# Patient Record
Sex: FEMALE | Race: WHITE | HISPANIC OR LATINO
[De-identification: names, ages, dates, MRNs, and addresses within clinical notes are randomized per-mention and may not be internally consistent; named-entity substitution may affect disease eponyms.]

---

## 2017-01-17 ENCOUNTER — APPOINTMENT (OUTPATIENT)
Dept: FAMILY MEDICINE | Facility: CLINIC | Age: 67
End: 2017-01-17

## 2017-01-18 ENCOUNTER — RX RENEWAL (OUTPATIENT)
Age: 67
End: 2017-01-18

## 2017-02-06 ENCOUNTER — APPOINTMENT (OUTPATIENT)
Dept: FAMILY MEDICINE | Facility: CLINIC | Age: 67
End: 2017-02-06

## 2017-02-07 ENCOUNTER — RESULT REVIEW (OUTPATIENT)
Age: 67
End: 2017-02-07

## 2017-02-08 ENCOUNTER — TRANSCRIPTION ENCOUNTER (OUTPATIENT)
Age: 67
End: 2017-02-08

## 2017-02-08 ENCOUNTER — OUTPATIENT (OUTPATIENT)
Dept: OUTPATIENT SERVICES | Facility: HOSPITAL | Age: 67
LOS: 1 days | Discharge: HOME | End: 2017-02-08
Payer: COMMERCIAL

## 2017-02-08 DIAGNOSIS — K29.50 UNSPECIFIED CHRONIC GASTRITIS WITHOUT BLEEDING: ICD-10-CM

## 2017-02-08 DIAGNOSIS — Z80.0 FAMILY HISTORY OF MALIGNANT NEOPLASM OF DIGESTIVE ORGANS: ICD-10-CM

## 2017-02-08 DIAGNOSIS — I10 ESSENTIAL (PRIMARY) HYPERTENSION: ICD-10-CM

## 2017-02-08 DIAGNOSIS — I69.922 DYSARTHRIA FOLLOWING UNSPECIFIED CEREBROVASCULAR DISEASE: ICD-10-CM

## 2017-02-08 DIAGNOSIS — R10.9 UNSPECIFIED ABDOMINAL PAIN: ICD-10-CM

## 2017-02-08 DIAGNOSIS — K20.9 ESOPHAGITIS, UNSPECIFIED: ICD-10-CM

## 2017-02-08 PROCEDURE — 88305 TISSUE EXAM BY PATHOLOGIST: CPT

## 2017-02-08 PROCEDURE — 88312 SPECIAL STAINS GROUP 1: CPT | Mod: 26

## 2017-02-08 PROCEDURE — 88305 TISSUE EXAM BY PATHOLOGIST: CPT | Mod: 26

## 2017-02-08 PROCEDURE — 43239 EGD BIOPSY SINGLE/MULTIPLE: CPT

## 2017-02-08 PROCEDURE — 88312 SPECIAL STAINS GROUP 1: CPT

## 2017-03-08 ENCOUNTER — TRANSCRIPTION ENCOUNTER (OUTPATIENT)
Age: 67
End: 2017-03-08

## 2017-03-08 ENCOUNTER — OUTPATIENT (OUTPATIENT)
Dept: OUTPATIENT SERVICES | Facility: HOSPITAL | Age: 67
LOS: 1 days | Discharge: HOME | End: 2017-03-08
Payer: COMMERCIAL

## 2017-03-08 DIAGNOSIS — Z12.11 ENCOUNTER FOR SCREENING FOR MALIGNANT NEOPLASM OF COLON: ICD-10-CM

## 2017-03-08 DIAGNOSIS — K64.8 OTHER HEMORRHOIDS: ICD-10-CM

## 2017-03-08 DIAGNOSIS — K57.30 DIVERTICULOSIS OF LARGE INTESTINE WITHOUT PERFORATION OR ABSCESS WITHOUT BLEEDING: ICD-10-CM

## 2017-03-08 DIAGNOSIS — Z86.73 PERSONAL HISTORY OF TRANSIENT ISCHEMIC ATTACK (TIA), AND CEREBRAL INFARCTION WITHOUT RESIDUAL DEFICITS: ICD-10-CM

## 2017-03-08 DIAGNOSIS — Z80.0 FAMILY HISTORY OF MALIGNANT NEOPLASM OF DIGESTIVE ORGANS: ICD-10-CM

## 2017-03-08 DIAGNOSIS — I10 ESSENTIAL (PRIMARY) HYPERTENSION: ICD-10-CM

## 2017-03-08 PROCEDURE — G0105: CPT

## 2017-03-23 ENCOUNTER — APPOINTMENT (OUTPATIENT)
Dept: FAMILY MEDICINE | Facility: CLINIC | Age: 67
End: 2017-03-23

## 2017-03-23 VITALS
OXYGEN SATURATION: 96 % | HEIGHT: 61 IN | HEART RATE: 88 BPM | BODY MASS INDEX: 28.32 KG/M2 | SYSTOLIC BLOOD PRESSURE: 120 MMHG | DIASTOLIC BLOOD PRESSURE: 80 MMHG | TEMPERATURE: 98.7 F | WEIGHT: 150 LBS

## 2017-03-23 DIAGNOSIS — Z00.00 ENCOUNTER FOR GENERAL ADULT MEDICAL EXAMINATION W/OUT ABNORMAL FINDINGS: ICD-10-CM

## 2017-03-23 DIAGNOSIS — R07.89 OTHER CHEST PAIN: ICD-10-CM

## 2017-03-23 DIAGNOSIS — Z87.42 PERSONAL HISTORY OF OTHER DISEASES OF THE FEMALE GENITAL TRACT: ICD-10-CM

## 2017-03-27 ENCOUNTER — LABORATORY RESULT (OUTPATIENT)
Age: 67
End: 2017-03-27

## 2017-03-29 LAB
25(OH)D3 SERPL-MCNC: 24.9 NG/ML
ALBUMIN SERPL ELPH-MCNC: 3.7 G/DL
ALP BLD-CCNC: 61 U/L
ALT SERPL-CCNC: 10 U/L
ANION GAP SERPL CALC-SCNC: 16 MMOL/L
APPEARANCE: ABNORMAL
AST SERPL-CCNC: 16 U/L
BASOPHILS # BLD AUTO: 0.02 K/UL
BASOPHILS NFR BLD AUTO: 0.3 %
BILIRUB DIRECT SERPL-MCNC: 0.1 MG/DL
BILIRUB INDIRECT SERPL-MCNC: 0.2 MG/DL
BILIRUB SERPL-MCNC: 0.3 MG/DL
BILIRUBIN URINE: NEGATIVE
BLOOD URINE: ABNORMAL
BUN SERPL-MCNC: 17 MG/DL
CALCIUM SERPL-MCNC: 10 MG/DL
CHLORIDE SERPL-SCNC: 98 MMOL/L
CHOLEST SERPL-MCNC: 195 MG/DL
CHOLEST/HDLC SERPL: 4.8 RATIO
CO2 SERPL-SCNC: 24 MMOL/L
COLOR: YELLOW
CREAT SERPL-MCNC: 0.65 MG/DL
CREAT SPEC-SCNC: 41 MG/DL
EOSINOPHIL # BLD AUTO: 0.1 K/UL
EOSINOPHIL NFR BLD AUTO: 1.5 %
FOLATE SERPL-MCNC: 11.4 NG/ML
GLUCOSE QUALITATIVE U: NORMAL MG/DL
GLUCOSE SERPL-MCNC: 95 MG/DL
HBA1C MFR BLD HPLC: 6 %
HCT VFR BLD CALC: 38.2 %
HDLC SERPL-MCNC: 41 MG/DL
HGB BLD-MCNC: 12.1 G/DL
IMM GRANULOCYTES NFR BLD AUTO: 0.3 %
KETONES URINE: NEGATIVE
LDLC SERPL CALC-MCNC: 136 MG/DL
LEUKOCYTE ESTERASE URINE: NEGATIVE
LYMPHOCYTES # BLD AUTO: 2.33 K/UL
LYMPHOCYTES NFR BLD AUTO: 34.1 %
MAN DIFF?: NORMAL
MCHC RBC-ENTMCNC: 28.9 PG
MCHC RBC-ENTMCNC: 31.7 GM/DL
MCV RBC AUTO: 91.2 FL
MICROALBUMIN 24H UR DL<=1MG/L-MCNC: 5.8 MG/DL
MICROALBUMIN/CREAT 24H UR-RTO: 140 UG/MG
MONOCYTES # BLD AUTO: 0.4 K/UL
MONOCYTES NFR BLD AUTO: 5.9 %
NEUTROPHILS # BLD AUTO: 3.96 K/UL
NEUTROPHILS NFR BLD AUTO: 57.9 %
NITRITE URINE: NEGATIVE
PH URINE: 8
PLATELET # BLD AUTO: 389 K/UL
POTASSIUM SERPL-SCNC: 4 MMOL/L
PROT SERPL-MCNC: 8.4 G/DL
PROTEIN URINE: NEGATIVE MG/DL
RBC # BLD: 4.19 M/UL
RBC # FLD: 14.5 %
SODIUM SERPL-SCNC: 138 MMOL/L
SPECIFIC GRAVITY URINE: 1.01
TRIGL SERPL-MCNC: 89 MG/DL
TSH SERPL-ACNC: 0.58 UIU/ML
URATE SERPL-MCNC: 3.5 MG/DL
UROBILINOGEN URINE: NORMAL MG/DL
VIT B12 SERPL-MCNC: >2000 PG/ML
WBC # FLD AUTO: 6.83 K/UL

## 2017-05-23 ENCOUNTER — APPOINTMENT (OUTPATIENT)
Dept: ULTRASOUND IMAGING | Facility: HOSPITAL | Age: 67
End: 2017-05-23

## 2017-05-23 ENCOUNTER — APPOINTMENT (OUTPATIENT)
Dept: MAMMOGRAPHY | Facility: HOSPITAL | Age: 67
End: 2017-05-23

## 2017-05-24 ENCOUNTER — FORM ENCOUNTER (OUTPATIENT)
Age: 67
End: 2017-05-24

## 2017-05-25 ENCOUNTER — OUTPATIENT (OUTPATIENT)
Dept: OUTPATIENT SERVICES | Facility: HOSPITAL | Age: 67
LOS: 1 days | End: 2017-05-25
Payer: COMMERCIAL

## 2017-05-25 ENCOUNTER — APPOINTMENT (OUTPATIENT)
Dept: MAMMOGRAPHY | Facility: HOSPITAL | Age: 67
End: 2017-05-25

## 2017-05-25 DIAGNOSIS — Z12.31 ENCOUNTER FOR SCREENING MAMMOGRAM FOR MALIGNANT NEOPLASM OF BREAST: ICD-10-CM

## 2017-05-25 PROCEDURE — 77063 BREAST TOMOSYNTHESIS BI: CPT

## 2017-05-25 PROCEDURE — 77067 SCR MAMMO BI INCL CAD: CPT

## 2017-05-31 ENCOUNTER — APPOINTMENT (OUTPATIENT)
Dept: FAMILY MEDICINE | Facility: CLINIC | Age: 67
End: 2017-05-31

## 2017-05-31 VITALS
OXYGEN SATURATION: 96 % | HEART RATE: 80 BPM | SYSTOLIC BLOOD PRESSURE: 111 MMHG | HEIGHT: 61 IN | TEMPERATURE: 98.8 F | DIASTOLIC BLOOD PRESSURE: 76 MMHG

## 2017-05-31 DIAGNOSIS — M25.549 PAIN IN JOINTS OF UNSPECIFIED HAND: ICD-10-CM

## 2017-05-31 DIAGNOSIS — H04.129 DRY EYE SYNDROME OF UNSPECIFIED LACRIMAL GLAND: ICD-10-CM

## 2017-05-31 DIAGNOSIS — M25.519 PAIN IN UNSPECIFIED SHOULDER: ICD-10-CM

## 2017-05-31 DIAGNOSIS — Z87.09 PERSONAL HISTORY OF OTHER DISEASES OF THE RESPIRATORY SYSTEM: ICD-10-CM

## 2017-05-31 RX ORDER — ACYCLOVIR 50 MG/G
5 OINTMENT TOPICAL 3 TIMES DAILY
Qty: 1 | Refills: 2 | Status: COMPLETED | COMMUNITY
Start: 2017-03-23 | End: 2017-05-31

## 2017-05-31 RX ORDER — AMOXICILLIN AND CLAVULANATE POTASSIUM 875; 125 MG/1; MG/1
875-125 TABLET, COATED ORAL
Qty: 14 | Refills: 0 | Status: COMPLETED | COMMUNITY
Start: 2017-03-23 | End: 2017-05-31

## 2017-06-08 LAB
25(OH)D3 SERPL-MCNC: 29 NG/ML
ALBUMIN SERPL ELPH-MCNC: 4.2 G/DL
ALP BLD-CCNC: 55 U/L
ALT SERPL-CCNC: 15 U/L
ANA PAT FLD IF-IMP: ABNORMAL
ANA SER IF-ACNC: ABNORMAL
ANION GAP SERPL CALC-SCNC: 13 MMOL/L
AST SERPL-CCNC: 18 U/L
BASOPHILS # BLD AUTO: 0.02 K/UL
BASOPHILS NFR BLD AUTO: 0.4 %
BILIRUB SERPL-MCNC: 0.2 MG/DL
BUN SERPL-MCNC: 23 MG/DL
CALCIUM SERPL-MCNC: 10 MG/DL
CHLORIDE SERPL-SCNC: 101 MMOL/L
CO2 SERPL-SCNC: 25 MMOL/L
CREAT SERPL-MCNC: 0.72 MG/DL
CRP SERPL-MCNC: 1.3 MG/DL
DSDNA AB SER-ACNC: <12 IU/ML
EOSINOPHIL # BLD AUTO: 0.12 K/UL
EOSINOPHIL NFR BLD AUTO: 2.2 %
ERYTHROCYTE [SEDIMENTATION RATE] IN BLOOD BY WESTERGREN METHOD: 50 MM/HR
FOLATE SERPL-MCNC: 7.6 NG/ML
GLUCOSE SERPL-MCNC: 95 MG/DL
HCT VFR BLD CALC: 38.7 %
HGB BLD-MCNC: 12.4 G/DL
IMM GRANULOCYTES NFR BLD AUTO: 0.2 %
LPL SERPL-CCNC: 32 U/L
LYMPHOCYTES # BLD AUTO: 2.07 K/UL
LYMPHOCYTES NFR BLD AUTO: 38.5 %
MAN DIFF?: NORMAL
MCHC RBC-ENTMCNC: 29.7 PG
MCHC RBC-ENTMCNC: 32 GM/DL
MCV RBC AUTO: 92.8 FL
MONOCYTES # BLD AUTO: 0.31 K/UL
MONOCYTES NFR BLD AUTO: 5.8 %
NEUTROPHILS # BLD AUTO: 2.84 K/UL
NEUTROPHILS NFR BLD AUTO: 52.9 %
PLATELET # BLD AUTO: 327 K/UL
POTASSIUM SERPL-SCNC: 4 MMOL/L
PROT SERPL-MCNC: 7.9 G/DL
RBC # BLD: 4.17 M/UL
RBC # FLD: 14.7 %
RHEUMATOID FACT SER QL: 8 IU/ML
SODIUM SERPL-SCNC: 139 MMOL/L
URATE SERPL-MCNC: 3.7 MG/DL
VIT B12 SERPL-MCNC: >2000 PG/ML
WBC # FLD AUTO: 5.37 K/UL

## 2017-06-12 ENCOUNTER — APPOINTMENT (OUTPATIENT)
Dept: FAMILY MEDICINE | Facility: CLINIC | Age: 67
End: 2017-06-12

## 2017-08-01 ENCOUNTER — APPOINTMENT (OUTPATIENT)
Dept: FAMILY MEDICINE | Facility: CLINIC | Age: 67
End: 2017-08-01

## 2017-08-08 ENCOUNTER — APPOINTMENT (OUTPATIENT)
Dept: FAMILY MEDICINE | Facility: CLINIC | Age: 67
End: 2017-08-08
Payer: MEDICARE

## 2017-08-08 VITALS
DIASTOLIC BLOOD PRESSURE: 68 MMHG | RESPIRATION RATE: 16 BRPM | HEART RATE: 92 BPM | BODY MASS INDEX: 29.07 KG/M2 | OXYGEN SATURATION: 97 % | WEIGHT: 154 LBS | SYSTOLIC BLOOD PRESSURE: 126 MMHG | HEIGHT: 61 IN

## 2017-08-08 DIAGNOSIS — B00.1 HERPESVIRAL VESICULAR DERMATITIS: ICD-10-CM

## 2017-08-08 DIAGNOSIS — Z60.2 PROBLEMS RELATED TO LIVING ALONE: ICD-10-CM

## 2017-08-08 PROCEDURE — 99214 OFFICE O/P EST MOD 30 MIN: CPT | Mod: 25

## 2017-08-08 PROCEDURE — 36415 COLL VENOUS BLD VENIPUNCTURE: CPT

## 2017-08-08 SDOH — SOCIAL STABILITY - SOCIAL INSECURITY: PROBLEMS RELATED TO LIVING ALONE: Z60.2

## 2017-08-09 ENCOUNTER — APPOINTMENT (OUTPATIENT)
Dept: RADIOLOGY | Facility: HOSPITAL | Age: 67
End: 2017-08-09

## 2017-08-09 LAB
ANION GAP SERPL CALC-SCNC: 12 MMOL/L
BASOPHILS # BLD AUTO: 0.02 K/UL
BASOPHILS NFR BLD AUTO: 0.3 %
BUN SERPL-MCNC: 13 MG/DL
CALCIUM SERPL-MCNC: 9.3 MG/DL
CHLORIDE SERPL-SCNC: 98 MMOL/L
CO2 SERPL-SCNC: 28 MMOL/L
CREAT SERPL-MCNC: 0.59 MG/DL
CRP SERPL-MCNC: 1 MG/DL
DSDNA AB SER-ACNC: <12 IU/ML
EOSINOPHIL # BLD AUTO: 0.1 K/UL
EOSINOPHIL NFR BLD AUTO: 1.6 %
GLUCOSE SERPL-MCNC: 83 MG/DL
HCT VFR BLD CALC: 37.3 %
HGB BLD-MCNC: 12 G/DL
IMM GRANULOCYTES NFR BLD AUTO: 0.2 %
LYMPHOCYTES # BLD AUTO: 2.26 K/UL
LYMPHOCYTES NFR BLD AUTO: 35.1 %
MAN DIFF?: NORMAL
MCHC RBC-ENTMCNC: 29 PG
MCHC RBC-ENTMCNC: 32.2 GM/DL
MCV RBC AUTO: 90.1 FL
MONOCYTES # BLD AUTO: 0.27 K/UL
MONOCYTES NFR BLD AUTO: 4.2 %
NEUTROPHILS # BLD AUTO: 3.77 K/UL
NEUTROPHILS NFR BLD AUTO: 58.6 %
PLATELET # BLD AUTO: 379 K/UL
POTASSIUM SERPL-SCNC: 4.3 MMOL/L
RBC # BLD: 4.14 M/UL
RBC # FLD: 15.7 %
RHEUMATOID FACT SER QL: 7 IU/ML
SODIUM SERPL-SCNC: 138 MMOL/L
URATE SERPL-MCNC: 3.2 MG/DL
WBC # FLD AUTO: 6.43 K/UL

## 2017-08-10 LAB
ANA PAT FLD IF-IMP: ABNORMAL
ANA SER IF-ACNC: ABNORMAL

## 2017-09-05 ENCOUNTER — RX RENEWAL (OUTPATIENT)
Age: 67
End: 2017-09-05

## 2017-11-20 ENCOUNTER — RX RENEWAL (OUTPATIENT)
Age: 67
End: 2017-11-20

## 2017-11-20 DIAGNOSIS — K22.70 BARRETT'S ESOPHAGUS W/OUT DYSPLASIA: ICD-10-CM

## 2018-01-23 ENCOUNTER — RX RENEWAL (OUTPATIENT)
Age: 68
End: 2018-01-23

## 2018-04-17 ENCOUNTER — RESULT REVIEW (OUTPATIENT)
Age: 68
End: 2018-04-17

## 2018-05-29 ENCOUNTER — OUTPATIENT (OUTPATIENT)
Dept: OUTPATIENT SERVICES | Facility: HOSPITAL | Age: 68
LOS: 1 days | End: 2018-05-29
Payer: COMMERCIAL

## 2018-05-29 ENCOUNTER — APPOINTMENT (OUTPATIENT)
Dept: MAMMOGRAPHY | Facility: HOSPITAL | Age: 68
End: 2018-05-29
Payer: MEDICARE

## 2018-05-29 DIAGNOSIS — Z00.8 ENCOUNTER FOR OTHER GENERAL EXAMINATION: ICD-10-CM

## 2018-05-29 DIAGNOSIS — Z12.31 ENCOUNTER FOR SCREENING MAMMOGRAM FOR MALIGNANT NEOPLASM OF BREAST: ICD-10-CM

## 2018-05-29 PROCEDURE — 77067 SCR MAMMO BI INCL CAD: CPT | Mod: 26

## 2018-05-29 PROCEDURE — 77063 BREAST TOMOSYNTHESIS BI: CPT | Mod: 26

## 2018-05-29 PROCEDURE — 77067 SCR MAMMO BI INCL CAD: CPT

## 2018-05-29 PROCEDURE — 77063 BREAST TOMOSYNTHESIS BI: CPT

## 2018-06-01 ENCOUNTER — APPOINTMENT (OUTPATIENT)
Dept: ULTRASOUND IMAGING | Facility: HOSPITAL | Age: 68
End: 2018-06-01
Payer: MEDICARE

## 2018-06-01 ENCOUNTER — OUTPATIENT (OUTPATIENT)
Dept: OUTPATIENT SERVICES | Facility: HOSPITAL | Age: 68
LOS: 1 days | End: 2018-06-01
Payer: COMMERCIAL

## 2018-06-01 ENCOUNTER — APPOINTMENT (OUTPATIENT)
Dept: MAMMOGRAPHY | Facility: HOSPITAL | Age: 68
End: 2018-06-01
Payer: MEDICARE

## 2018-06-01 DIAGNOSIS — R92.8 OTHER ABNORMAL AND INCONCLUSIVE FINDINGS ON DIAGNOSTIC IMAGING OF BREAST: ICD-10-CM

## 2018-06-01 DIAGNOSIS — Z00.8 ENCOUNTER FOR OTHER GENERAL EXAMINATION: ICD-10-CM

## 2018-06-01 PROCEDURE — 77065 DX MAMMO INCL CAD UNI: CPT | Mod: 26,LT

## 2018-06-01 PROCEDURE — 76641 ULTRASOUND BREAST COMPLETE: CPT

## 2018-06-01 PROCEDURE — 76641 ULTRASOUND BREAST COMPLETE: CPT | Mod: 26,LT

## 2018-06-01 PROCEDURE — G0279: CPT | Mod: 26

## 2018-06-01 PROCEDURE — G0279: CPT

## 2018-06-01 PROCEDURE — 77065 DX MAMMO INCL CAD UNI: CPT

## 2018-07-17 ENCOUNTER — RX RENEWAL (OUTPATIENT)
Age: 68
End: 2018-07-17

## 2018-08-22 ENCOUNTER — APPOINTMENT (OUTPATIENT)
Dept: ORTHOPEDIC SURGERY | Facility: CLINIC | Age: 68
End: 2018-08-22
Payer: MEDICARE

## 2018-08-22 VITALS
DIASTOLIC BLOOD PRESSURE: 81 MMHG | HEART RATE: 77 BPM | HEIGHT: 63 IN | BODY MASS INDEX: 26.58 KG/M2 | WEIGHT: 150 LBS | SYSTOLIC BLOOD PRESSURE: 145 MMHG

## 2018-08-22 DIAGNOSIS — M12.811 OTHER SPECIFIC ARTHROPATHIES, NOT ELSEWHERE CLASSIFIED, RIGHT SHOULDER: ICD-10-CM

## 2018-08-22 PROCEDURE — 73030 X-RAY EXAM OF SHOULDER: CPT | Mod: RT

## 2018-08-22 PROCEDURE — 99203 OFFICE O/P NEW LOW 30 MIN: CPT

## 2018-09-24 ENCOUNTER — OUTPATIENT (OUTPATIENT)
Dept: OUTPATIENT SERVICES | Facility: HOSPITAL | Age: 68
LOS: 1 days | End: 2018-09-24
Payer: COMMERCIAL

## 2018-09-24 ENCOUNTER — APPOINTMENT (OUTPATIENT)
Dept: MRI IMAGING | Facility: HOSPITAL | Age: 68
End: 2018-09-24
Payer: MEDICARE

## 2018-09-24 DIAGNOSIS — Z00.8 ENCOUNTER FOR OTHER GENERAL EXAMINATION: ICD-10-CM

## 2018-09-24 PROCEDURE — 73221 MRI JOINT UPR EXTREM W/O DYE: CPT

## 2018-09-24 PROCEDURE — 73221 MRI JOINT UPR EXTREM W/O DYE: CPT | Mod: 26,RT

## 2018-10-10 ENCOUNTER — APPOINTMENT (OUTPATIENT)
Dept: FAMILY MEDICINE | Facility: CLINIC | Age: 68
End: 2018-10-10

## 2018-10-29 ENCOUNTER — APPOINTMENT (OUTPATIENT)
Dept: ORTHOPEDIC SURGERY | Facility: CLINIC | Age: 68
End: 2018-10-29
Payer: MEDICARE

## 2018-10-29 VITALS
HEIGHT: 63 IN | HEART RATE: 81 BPM | SYSTOLIC BLOOD PRESSURE: 149 MMHG | DIASTOLIC BLOOD PRESSURE: 83 MMHG | BODY MASS INDEX: 26.58 KG/M2 | WEIGHT: 150 LBS

## 2018-10-29 DIAGNOSIS — M75.121 COMPLETE ROTATOR CUFF TEAR OR RUPTURE OF RIGHT SHOULDER, NOT SPECIFIED AS TRAUMATIC: ICD-10-CM

## 2018-10-29 PROCEDURE — 99214 OFFICE O/P EST MOD 30 MIN: CPT

## 2018-11-02 PROBLEM — M75.121 COMPLETE TEAR OF RIGHT ROTATOR CUFF: Status: ACTIVE | Noted: 2018-11-02

## 2018-12-10 ENCOUNTER — OUTPATIENT (OUTPATIENT)
Dept: OUTPATIENT SERVICES | Facility: HOSPITAL | Age: 68
LOS: 1 days | End: 2018-12-10
Payer: MEDICARE

## 2018-12-10 VITALS
DIASTOLIC BLOOD PRESSURE: 74 MMHG | HEART RATE: 81 BPM | RESPIRATION RATE: 14 BRPM | TEMPERATURE: 98 F | OXYGEN SATURATION: 98 % | SYSTOLIC BLOOD PRESSURE: 132 MMHG | WEIGHT: 160.06 LBS | HEIGHT: 61.5 IN

## 2018-12-10 DIAGNOSIS — I63.9 CEREBRAL INFARCTION, UNSPECIFIED: ICD-10-CM

## 2018-12-10 DIAGNOSIS — I10 ESSENTIAL (PRIMARY) HYPERTENSION: ICD-10-CM

## 2018-12-10 DIAGNOSIS — M67.921 UNSPECIFIED DISORDER OF SYNOVIUM AND TENDON, RIGHT UPPER ARM: ICD-10-CM

## 2018-12-10 DIAGNOSIS — Z98.891 HISTORY OF UTERINE SCAR FROM PREVIOUS SURGERY: Chronic | ICD-10-CM

## 2018-12-10 DIAGNOSIS — M75.100 UNSPECIFIED ROTATOR CUFF TEAR OR RUPTURE OF UNSPECIFIED SHOULDER, NOT SPECIFIED AS TRAUMATIC: ICD-10-CM

## 2018-12-10 LAB
BUN SERPL-MCNC: 14 MG/DL — SIGNIFICANT CHANGE UP (ref 7–23)
CALCIUM SERPL-MCNC: 9.7 MG/DL — SIGNIFICANT CHANGE UP (ref 8.4–10.5)
CHLORIDE SERPL-SCNC: 98 MMOL/L — SIGNIFICANT CHANGE UP (ref 98–107)
CO2 SERPL-SCNC: 27 MMOL/L — SIGNIFICANT CHANGE UP (ref 22–31)
CREAT SERPL-MCNC: 0.59 MG/DL — SIGNIFICANT CHANGE UP (ref 0.5–1.3)
GLUCOSE SERPL-MCNC: 91 MG/DL — SIGNIFICANT CHANGE UP (ref 70–99)
HCT VFR BLD CALC: 39.6 % — SIGNIFICANT CHANGE UP (ref 34.5–45)
HGB BLD-MCNC: 12.7 G/DL — SIGNIFICANT CHANGE UP (ref 11.5–15.5)
MCHC RBC-ENTMCNC: 29.8 PG — SIGNIFICANT CHANGE UP (ref 27–34)
MCHC RBC-ENTMCNC: 32.1 % — SIGNIFICANT CHANGE UP (ref 32–36)
MCV RBC AUTO: 93 FL — SIGNIFICANT CHANGE UP (ref 80–100)
NRBC # FLD: 0 — SIGNIFICANT CHANGE UP
PLATELET # BLD AUTO: 274 K/UL — SIGNIFICANT CHANGE UP (ref 150–400)
PMV BLD: 8.7 FL — SIGNIFICANT CHANGE UP (ref 7–13)
POTASSIUM SERPL-MCNC: 3.6 MMOL/L — SIGNIFICANT CHANGE UP (ref 3.5–5.3)
POTASSIUM SERPL-SCNC: 3.6 MMOL/L — SIGNIFICANT CHANGE UP (ref 3.5–5.3)
RBC # BLD: 4.26 M/UL — SIGNIFICANT CHANGE UP (ref 3.8–5.2)
RBC # FLD: 14.6 % — HIGH (ref 10.3–14.5)
SODIUM SERPL-SCNC: 137 MMOL/L — SIGNIFICANT CHANGE UP (ref 135–145)
WBC # BLD: 6.22 K/UL — SIGNIFICANT CHANGE UP (ref 3.8–10.5)
WBC # FLD AUTO: 6.22 K/UL — SIGNIFICANT CHANGE UP (ref 3.8–10.5)

## 2018-12-10 PROCEDURE — 93010 ELECTROCARDIOGRAM REPORT: CPT

## 2018-12-10 NOTE — H&P PST ADULT - HISTORY OF PRESENT ILLNESS
69y/o female presents for preop  eval for scheduled right shoulder major debridement, subacromial decompression 67y/o female presents for preop  eval for scheduled right shoulder major debridement, subacromial decompression acromioplasty orthoscopic rotator cuff repair biceps tenotomy on 12/19/18.  Pt with c/o right shoulder pain for several months that has progressively worsened.  MRI done. Preop dx complete rotator cuff tear.  Pt is Slovenian speaking & is a poor historian.

## 2018-12-10 NOTE — H&P PST ADULT - NEGATIVE CARDIOVASCULAR SYMPTOMS
no claudication/no dyspnea on exertion/no chest pain/no orthopnea/no paroxysmal nocturnal dyspnea/no palpitations

## 2018-12-10 NOTE — H&P PST ADULT - NSANTHOSAYNRD_GEN_A_CORE
No. CHEYANNE screening performed.  STOP BANG Legend: 0-2 = LOW Risk; 3-4 = INTERMEDIATE Risk; 5-8 = HIGH Risk

## 2018-12-10 NOTE — H&P PST ADULT - PROBLEM SELECTOR PLAN 3
instructed to continue low dose aspirin  pt have appointment with PCP on 12/12/18 to confirm with PCP

## 2018-12-10 NOTE — H&P PST ADULT - PMH
Depression    Hypertension    Rotator cuff tear    Stroke Acid reflux    Depression    Hypertension    Rotator cuff tear    Stroke  2012

## 2018-12-10 NOTE — H&P PST ADULT - PROBLEM SELECTOR PLAN 1
scheduled right shoulder major debridement, subacromial decompression acromioplasty orthoscopic rotator cuff repair biceps tenotomy on 12/19/18.   preop instruction gi prophylaxis & surgical soap given  pt verbalized understanding

## 2018-12-10 NOTE — H&P PST ADULT - MUSCULOSKELETAL
details… detailed exam ROM intact/no joint swelling/no joint erythema no joint swelling/no joint erythema/right shoulder/decreased ROM due to pain

## 2018-12-11 PROBLEM — I63.9 CEREBRAL INFARCTION, UNSPECIFIED: Chronic | Status: ACTIVE | Noted: 2018-12-10

## 2018-12-13 ENCOUNTER — APPOINTMENT (OUTPATIENT)
Dept: CARDIOLOGY | Facility: CLINIC | Age: 68
End: 2018-12-13

## 2018-12-13 NOTE — PHYSICAL EXAM
[General Appearance - Well Developed] : well developed [Normal Appearance] : normal appearance [Well Groomed] : well groomed [General Appearance - Well Nourished] : well nourished [No Deformities] : no deformities [General Appearance - In No Acute Distress] : no acute distress [Normal Conjunctiva] : the conjunctiva exhibited no abnormalities [Normal Oral Mucosa] : normal oral mucosa [Normal Jugular Venous A Waves Present] : normal jugular venous A waves present [Normal Jugular Venous V Waves Present] : normal jugular venous V waves present [No Jugular Venous Horan A Waves] : no jugular venous horan A waves [Not Palpable] : not palpable [Normal Rate] : normal [Normal S1] : normal S1 [Normal S2] : normal S2 [S3] : no S3 [S4] : no S4 [No Murmur] : no murmurs heard [2+] : left 2+ [Right Carotid Bruit] : no bruit heard over the right carotid [Left Carotid Bruit] : no bruit heard over the left carotid [Right Femoral Bruit] : no bruit heard over the right femoral artery [Left Femoral Bruit] : no bruit heard over the left femoral artery [1+] : left 1+ [No Abnormalities] : the abdominal aorta was not enlarged and no bruit was heard [No Pitting Edema] : no pitting edema present [Respiration, Rhythm And Depth] : normal respiratory rhythm and effort [Exaggerated Use Of Accessory Muscles For Inspiration] : no accessory muscle use [Auscultation Breath Sounds / Voice Sounds] : lungs were clear to auscultation bilaterally [Bowel Sounds] : normal bowel sounds [Abdomen Soft] : soft [Abdomen Tenderness] : non-tender [Abnormal Walk] : normal gait [Gait - Sufficient For Exercise Testing] : the gait was sufficient for exercise testing [Nail Clubbing] : no clubbing of the fingernails [Cyanosis, Localized] : no localized cyanosis [Skin Color & Pigmentation] : normal skin color and pigmentation [Skin Turgor] : normal skin turgor [] : no rash [Oriented To Time, Place, And Person] : oriented to person, place, and time [Impaired Insight] : insight and judgment were intact [No Anxiety] : not feeling anxious

## 2018-12-19 ENCOUNTER — APPOINTMENT (OUTPATIENT)
Dept: ORTHOPEDIC SURGERY | Facility: AMBULATORY SURGERY CENTER | Age: 68
End: 2018-12-19

## 2018-12-31 ENCOUNTER — APPOINTMENT (OUTPATIENT)
Dept: ORTHOPEDIC SURGERY | Facility: CLINIC | Age: 68
End: 2018-12-31

## 2019-01-07 PROBLEM — M75.100 UNSPECIFIED ROTATOR CUFF TEAR OR RUPTURE OF UNSPECIFIED SHOULDER, NOT SPECIFIED AS TRAUMATIC: Chronic | Status: ACTIVE | Noted: 2018-12-10

## 2019-01-07 PROBLEM — I10 ESSENTIAL (PRIMARY) HYPERTENSION: Chronic | Status: ACTIVE | Noted: 2018-12-10

## 2019-01-07 PROBLEM — F32.9 MAJOR DEPRESSIVE DISORDER, SINGLE EPISODE, UNSPECIFIED: Chronic | Status: ACTIVE | Noted: 2018-12-10

## 2019-01-07 PROBLEM — K21.9 GASTRO-ESOPHAGEAL REFLUX DISEASE WITHOUT ESOPHAGITIS: Chronic | Status: ACTIVE | Noted: 2018-12-10

## 2019-01-08 ENCOUNTER — INPATIENT (INPATIENT)
Facility: HOSPITAL | Age: 69
LOS: 0 days | Discharge: HOME | DRG: 247 | End: 2019-01-09
Attending: INTERNAL MEDICINE | Admitting: INTERNAL MEDICINE
Payer: COMMERCIAL

## 2019-01-08 ENCOUNTER — TRANSCRIPTION ENCOUNTER (OUTPATIENT)
Age: 69
End: 2019-01-08

## 2019-01-08 VITALS
TEMPERATURE: 99 F | SYSTOLIC BLOOD PRESSURE: 135 MMHG | WEIGHT: 149.91 LBS | RESPIRATION RATE: 20 BRPM | HEIGHT: 63 IN | DIASTOLIC BLOOD PRESSURE: 69 MMHG | OXYGEN SATURATION: 98 % | HEART RATE: 71 BPM

## 2019-01-08 DIAGNOSIS — I25.10 ATHEROSCLEROTIC HEART DISEASE OF NATIVE CORONARY ARTERY WITHOUT ANGINA PECTORIS: ICD-10-CM

## 2019-01-08 DIAGNOSIS — Z98.891 HISTORY OF UTERINE SCAR FROM PREVIOUS SURGERY: Chronic | ICD-10-CM

## 2019-01-08 LAB
ALBUMIN SERPL ELPH-MCNC: 4.3 G/DL — SIGNIFICANT CHANGE UP (ref 3.3–5)
ALP SERPL-CCNC: 55 U/L — SIGNIFICANT CHANGE UP (ref 40–120)
ALT FLD-CCNC: 10 U/L — SIGNIFICANT CHANGE UP (ref 10–45)
ANION GAP SERPL CALC-SCNC: 11 MMOL/L — SIGNIFICANT CHANGE UP (ref 5–17)
AST SERPL-CCNC: 18 U/L — SIGNIFICANT CHANGE UP (ref 10–40)
BILIRUB SERPL-MCNC: 0.6 MG/DL — SIGNIFICANT CHANGE UP (ref 0.2–1.2)
BUN SERPL-MCNC: 14 MG/DL — SIGNIFICANT CHANGE UP (ref 7–23)
CALCIUM SERPL-MCNC: 9.8 MG/DL — SIGNIFICANT CHANGE UP (ref 8.4–10.5)
CHLORIDE SERPL-SCNC: 100 MMOL/L — SIGNIFICANT CHANGE UP (ref 96–108)
CO2 SERPL-SCNC: 25 MMOL/L — SIGNIFICANT CHANGE UP (ref 22–31)
CREAT SERPL-MCNC: 0.6 MG/DL — SIGNIFICANT CHANGE UP (ref 0.5–1.3)
GLUCOSE SERPL-MCNC: 97 MG/DL — SIGNIFICANT CHANGE UP (ref 70–99)
HCT VFR BLD CALC: 38.8 % — SIGNIFICANT CHANGE UP (ref 34.5–45)
HGB BLD-MCNC: 13.3 G/DL — SIGNIFICANT CHANGE UP (ref 11.5–15.5)
MCHC RBC-ENTMCNC: 31 PG — SIGNIFICANT CHANGE UP (ref 27–34)
MCHC RBC-ENTMCNC: 34.3 GM/DL — SIGNIFICANT CHANGE UP (ref 32–36)
MCV RBC AUTO: 90.2 FL — SIGNIFICANT CHANGE UP (ref 80–100)
PLATELET # BLD AUTO: 284 K/UL — SIGNIFICANT CHANGE UP (ref 150–400)
POTASSIUM SERPL-MCNC: 3.7 MMOL/L — SIGNIFICANT CHANGE UP (ref 3.5–5.3)
POTASSIUM SERPL-SCNC: 3.7 MMOL/L — SIGNIFICANT CHANGE UP (ref 3.5–5.3)
PROT SERPL-MCNC: 8.1 G/DL — SIGNIFICANT CHANGE UP (ref 6–8.3)
RBC # BLD: 4.29 M/UL — SIGNIFICANT CHANGE UP (ref 3.8–5.2)
RBC # FLD: 13.3 % — SIGNIFICANT CHANGE UP (ref 10.3–14.5)
SODIUM SERPL-SCNC: 136 MMOL/L — SIGNIFICANT CHANGE UP (ref 135–145)
WBC # BLD: 6.4 K/UL — SIGNIFICANT CHANGE UP (ref 3.8–10.5)
WBC # FLD AUTO: 6.4 K/UL — SIGNIFICANT CHANGE UP (ref 3.8–10.5)

## 2019-01-08 PROCEDURE — 99152 MOD SED SAME PHYS/QHP 5/>YRS: CPT | Mod: GC

## 2019-01-08 PROCEDURE — 93458 L HRT ARTERY/VENTRICLE ANGIO: CPT | Mod: 26,59,GC

## 2019-01-08 PROCEDURE — 92928 PRQ TCAT PLMT NTRAC ST 1 LES: CPT | Mod: LD,GC

## 2019-01-08 PROCEDURE — 93010 ELECTROCARDIOGRAM REPORT: CPT

## 2019-01-08 RX ORDER — CLOPIDOGREL BISULFATE 75 MG/1
1 TABLET, FILM COATED ORAL
Qty: 90 | Refills: 3 | OUTPATIENT
Start: 2019-01-08 | End: 2020-01-02

## 2019-01-08 RX ORDER — SODIUM CHLORIDE 9 MG/ML
3 INJECTION INTRAMUSCULAR; INTRAVENOUS; SUBCUTANEOUS EVERY 8 HOURS
Qty: 0 | Refills: 0 | Status: DISCONTINUED | OUTPATIENT
Start: 2019-01-08 | End: 2019-01-09

## 2019-01-08 RX ORDER — PANTOPRAZOLE SODIUM 20 MG/1
40 TABLET, DELAYED RELEASE ORAL
Qty: 0 | Refills: 0 | Status: DISCONTINUED | OUTPATIENT
Start: 2019-01-08 | End: 2019-01-09

## 2019-01-08 RX ORDER — METOPROLOL TARTRATE 50 MG
25 TABLET ORAL DAILY
Qty: 0 | Refills: 0 | Status: DISCONTINUED | OUTPATIENT
Start: 2019-01-08 | End: 2019-01-09

## 2019-01-08 RX ORDER — MELOXICAM 15 MG/1
1 TABLET ORAL
Qty: 0 | Refills: 0 | COMMUNITY

## 2019-01-08 RX ORDER — ATORVASTATIN CALCIUM 80 MG/1
40 TABLET, FILM COATED ORAL AT BEDTIME
Qty: 0 | Refills: 0 | Status: DISCONTINUED | OUTPATIENT
Start: 2019-01-08 | End: 2019-01-09

## 2019-01-08 RX ORDER — CITALOPRAM 10 MG/1
20 TABLET, FILM COATED ORAL DAILY
Qty: 0 | Refills: 0 | Status: DISCONTINUED | OUTPATIENT
Start: 2019-01-08 | End: 2019-01-09

## 2019-01-08 RX ORDER — CLOPIDOGREL BISULFATE 75 MG/1
75 TABLET, FILM COATED ORAL DAILY
Qty: 0 | Refills: 0 | Status: DISCONTINUED | OUTPATIENT
Start: 2019-01-08 | End: 2019-01-09

## 2019-01-08 RX ORDER — HYDROCHLOROTHIAZIDE 25 MG
25 TABLET ORAL DAILY
Qty: 0 | Refills: 0 | Status: DISCONTINUED | OUTPATIENT
Start: 2019-01-08 | End: 2019-01-09

## 2019-01-08 RX ORDER — METOPROLOL TARTRATE 50 MG
1 TABLET ORAL
Qty: 0 | Refills: 0 | COMMUNITY

## 2019-01-08 RX ORDER — ASPIRIN/CALCIUM CARB/MAGNESIUM 324 MG
1 TABLET ORAL
Qty: 0 | Refills: 0 | DISCHARGE
Start: 2019-01-08

## 2019-01-08 RX ORDER — ASPIRIN/CALCIUM CARB/MAGNESIUM 324 MG
81 TABLET ORAL DAILY
Qty: 0 | Refills: 0 | Status: DISCONTINUED | OUTPATIENT
Start: 2019-01-08 | End: 2019-01-09

## 2019-01-08 RX ADMIN — SODIUM CHLORIDE 3 MILLILITER(S): 9 INJECTION INTRAMUSCULAR; INTRAVENOUS; SUBCUTANEOUS at 16:30

## 2019-01-08 RX ADMIN — SODIUM CHLORIDE 3 MILLILITER(S): 9 INJECTION INTRAMUSCULAR; INTRAVENOUS; SUBCUTANEOUS at 21:11

## 2019-01-08 NOTE — DISCHARGE NOTE ADULT - CARE PROVIDER_API CALL
Michael Dodge), Cardiovascular Disease; Interventional Cardiology  [address] [place] [address]  Phone: [phone]  Fax: [phone] Michael Dodge), Cardiovascular Disease; Interventional Cardiology  [address] [place] [address]  Phone: [phone]  Fax: [phone]    Mele Hernandez (MD), Cardiovascular Disease  [address] [place] [address]  Phone: [phone]  Fax: [phone] Mele Hernandez (MD), Cardiovascular Disease  [address] [place] [address]  Phone: [phone]  Fax: [phone]

## 2019-01-08 NOTE — DISCHARGE NOTE ADULT - PATIENT PORTAL LINK FT
You can access the [facility] Patient Portal, offered by [facility], by registering with the following website: [email or web address]

## 2019-01-08 NOTE — DISCHARGE NOTE ADULT - CARE PROVIDERS DIRECT ADDRESSES
,[email or web address],DirectAddress_Unknown ,DirectAddress_Unknown

## 2019-01-08 NOTE — DISCHARGE NOTE ADULT - HOSPITAL COURSE
69y/o female presents for preop  eval for complete rotator cuff tear, scheduled right shoulder major debridement, subacromial decompression acromioplasty orthoscopic rotator cuff repair biceps tenotomy.  Pt with c/o right shoulder pain for several months & left sided chest pressure on exertion, that has progressively worsened. s/p echo on 12/5/18 which shows normal lv size func with EF~65% no regional wall motion abnormalities, stress test revealing mild apical ischemia seen & evaluated by Dr JOANN figueredo & now recommends for cardiac cath   Pt is Niuean speaking & is a poor historian.   Pt now S/P PCI  LULA X1 to pLAD via Right radial access. Pt tolerated procedure well. Post PCI EKG without acute changes. Right radial access site benign without presence of bleeding/hematoma, pt without complaints, radial pulse palpable. Pt remains hemodynamically stable and her hospital course was otherwise uneventful.  Pt is now medically stable for discharge home today. 69y/o female presents for preop  eval for complete rotator cuff tear, scheduled right shoulder major debridement, subacromial decompression acromioplasty orthoscopic rotator cuff repair biceps tenotomy.  Pt with c/o right shoulder pain for several months & left sided chest pressure on exertion, that has progressively worsened. s/p echo on 12/5/18 which shows normal lv size func with EF~65% no regional wall motion abnormalities, stress test revealing mild apical ischemia seen & evaluated by Dr JOANN figueredo & now recommends for cardiac cath   Pt is Gibralt[place] speaking & is a poor historian.   Pt now S/P PCI  LULA X1 to mLAD via Right radial access. Pt tolerated procedure well. Post PCI EKG without acute changes. Right radial access site benign without presence of bleeding/hematoma, pt without complaints, radial pulse palpable. Pt remains hemodynamically stable and her hospital course was otherwise uneventful.  Pt is now medically stable for discharge home today.

## 2019-01-08 NOTE — DISCHARGE NOTE ADULT - CARE PLAN
Principal Discharge DX:	CAD (coronary artery disease)  Goal:	Patient remains chest pain free and understands post cath discharge instructions  Assessment and plan of treatment:	S/P PCI  Do not stop you Aspirin or Plavix unless instructed to do so by your cardiologist.  Secondary Diagnosis:	Hypertension  Goal:	Your blood pressure will be controlled.  Assessment and plan of treatment:	Continue with your blood pressure medications; eat a heart healthy diet with low salt diet; exercise regularly (consult with your physician or cardiologist first); maintain a heart healthy weight; if you smoke - quit (A resource to help you stop smoking is the [facility] – phone number [phone].); include healthy ways to manage stress. Continue to follow with your primary care physician or cardiologist. Principal Discharge DX:	CAD (coronary artery disease)  Goal:	Patient remains chest pain free and understands post cath discharge instructions  Assessment and plan of treatment:	S/P PCI  Do not stop you Aspirin or Plavix unless instructed to do so by your cardiologist.  No heavy lifting for 2 weeks, no strenuous activity  ( pushing/ pulling) no driving for x 2 days,  you may shower 24 hours following procedure but no bathing or swimming for x1  week, no strenuous sex for x 1 week & follow up with your cardiologist in 1-2 week  Secondary Diagnosis:	Hypertension  Goal:	Your blood pressure will be controlled.  Assessment and plan of treatment:	Continue with your blood pressure medications; eat a heart healthy diet with low salt diet; exercise regularly (consult with your physician or cardiologist first); maintain a heart healthy weight; if you smoke - quit (A resource to help you stop smoking is the [facility] Tobacco [facility] – phone number [phone].); include healthy ways to manage stress. Continue to follow with your primary care physician or cardiologist. Principal Discharge DX:	CAD (coronary artery disease)  Goal:	Patient remains chest pain free and understands post cath discharge instructions  Assessment and plan of treatment:	S/P PCI  Do not stop you Aspirin or Plavix unless instructed to do so by your cardiologist.  No heavy lifting for 2 weeks, no strenuous activity  ( pushing/ pulling) no driving for x 2 days,  you may shower 24 hours following procedure but no bathing or swimming for x1  week, no strenuous sex for x 1 week & follow up with your cardiologist in 1-2 week  Secondary Diagnosis:	Hypertension  Goal:	Your blood pressure will be controlled.  Assessment and plan of treatment:	Continue with your blood pressure medications; eat a heart healthy diet with low salt diet; exercise regularly (consult with your physician or cardiologist first); maintain a heart healthy weight; if you smoke - quit (A resource to help you stop smoking is the [facility] [employer] [facility] – phone number [phone].); include healthy ways to manage stress. Continue to follow with your primary care physician or cardiologist.  Secondary Diagnosis:	Hyperlipidemia  Goal:	LDL<70  Assessment and plan of treatment:	Goal is to keep LDL<70. Continue with your cholesterol medications as prescribed. Eat a heart healthy diet that is low in saturated fats and salt, and includes whole grains, fruits, vegetables and lean protein; exercise regularly (consult with your physician or cardiologist first); maintain a heart healthy weight; if you smoke - quit (A resource to help you stop smoking is the [facility] [employer] – phone number [phone].). Continue to follow with your primary physician or cardiologist.

## 2019-01-08 NOTE — DISCHARGE NOTE ADULT - PROVIDER TOKENS
Not applicable SUZETTE:'[id number]' TOKEN:'[id number]',TOKEN:'[id number]' TOKEN:'[id number]'

## 2019-01-08 NOTE — DISCHARGE NOTE ADULT - MEDICATION SUMMARY - MEDICATIONS TO TAKE
I will START or STAY ON the medications listed below when I get home from the hospital:    aspirin 81 mg oral delayed release tablet  -- 1 tab(s) by mouth once a day  -- Indication: For keep stent open    citalopram 20 mg oral tablet  -- 1 tab(s) by mouth once a day  -- Indication: For for depression    atorvastatin 40 mg oral tablet  -- 1 tab(s) by mouth once a day  -- Indication: For for high cholesterol    clopidogrel 75 mg oral tablet  -- 1 tab(s) by mouth once a day MDD:1  -- Indication: For keep stent open    Metoprolol Succinate ER 25 mg oral tablet, extended release  -- 1 tab(s) by mouth once a day  -- Indication: For for high blood pressure    hydroCHLOROthiazide 25 mg oral tablet  -- 1 tab(s) by mouth once a day  -- Indication: For for high blood pressure    raNITIdine 150 mg oral capsule  -- 1 cap(s) by mouth 2 times a day  -- Indication: For Anti ulcer    omeprazole 40 mg oral delayed release capsule  -- 1 cap(s) by mouth once a day  -- Indication: For Anti ulcer I will START or STAY ON the medications listed below when I get home from the hospital:    aspirin 81 mg oral delayed release tablet  -- 1 tab(s) by mouth once a day  -- Indication: For keep stent open    citalopram 20 mg oral tablet  -- 1 tab(s) by mouth once a day  -- Indication: For for depression    atorvastatin 40 mg oral tablet  -- 1 tab(s) by mouth once a day  -- Indication: For for high cholesterol    clopidogrel 75 mg oral tablet  -- 1 tab(s) by mouth once a day MDD:1  -- Indication: For keep stent open    Metoprolol Succinate ER 25 mg oral tablet, extended release  -- 1 tab(s) by mouth once a day  -- Indication: For for high blood pressure    hydroCHLOROthiazide 25 mg oral tablet  -- 1 tab(s) by mouth once a day  -- Indication: For for high blood pressure    raNITIdine 150 mg oral capsule  -- 1 cap(s) by mouth 2 times a day  -- Indication: For Anti ulcer/heartburn    omeprazole 40 mg oral delayed release capsule  -- 1 cap(s) by mouth once a day  -- Indication: For Anti ulcer/heartburn

## 2019-01-08 NOTE — DISCHARGE NOTE ADULT - PLAN OF CARE
Patient remains chest pain free and understands post cath discharge instructions S/P PCI  Do not stop you Aspirin or Plavix unless instructed to do so by your cardiologist. Your blood pressure will be controlled. Continue with your blood pressure medications; eat a heart healthy diet with low salt diet; exercise regularly (consult with your physician or cardiologist first); maintain a heart healthy weight; if you smoke - quit (A resource to help you stop smoking is the [facility] – phone number [phone].); include healthy ways to manage stress. Continue to follow with your primary care physician or cardiologist. S/P PCI  Do not stop you Aspirin or Plavix unless instructed to do so by your cardiologist.  No heavy lifting for 2 weeks, no strenuous activity  ( pushing/ pulling) no driving for x 2 days,  you may shower 24 hours following procedure but no bathing or swimming for x1  week, no strenuous sex for x 1 week & follow up with your cardiologist in 1-2 week LDL<70 Goal is to keep LDL<70. Continue with your cholesterol medications as prescribed. Eat a heart healthy diet that is low in saturated fats and salt, and includes whole grains, fruits, vegetables and lean protein; exercise regularly (consult with your physician or cardiologist first); maintain a heart healthy weight; if you smoke - quit (A resource to help you stop smoking is the [facility] for Tobacco [facility] – phone number [phone].). Continue to follow with your primary physician or cardiologist.

## 2019-01-08 NOTE — CHART NOTE - NSCHARTNOTEFT_GEN_A_CORE
Patient underwent a PCI procedure and is being admitted as they are at increased risk for major adverse cardiac and vascular events if discharged due to the following high risk characteristics: UA

## 2019-01-08 NOTE — H&P CARDIOLOGY - HISTORY OF PRESENT ILLNESS
67y/o female presents for preop  eval for complete rotator cuff tear, scheduled right shoulder major debridement, subacromial decompression acromioplasty orthoscopic rotator cuff repair biceps tenotomy on 12/19/18.  Pt with c/o right shoulder pain for several months & left sided chest pressure on exertion, that has progressively worsened. s/p echo on 12/5/18 which shows normal lv size func with EF~65% no regional wall motion abnormalities, stress test revealing mild apical ischemia seen & evaluated by Dr JOANN figueredo & now recommends for cardiac cath   Pt is Zambian speaking & is a poor historian. 67y/o female presents for preop  eval for complete rotator cuff tear, scheduled right shoulder major debridement, subacromial decompression acromioplasty orthoscopic rotator cuff repair biceps tenotomy.  Pt with c/o right shoulder pain for several months & left sided chest pressure on exertion, that has progressively worsened. s/p echo on 12/5/18 which shows normal lv size func with EF~65% no regional wall motion abnormalities, stress test revealing mild apical ischemia seen & evaluated by Dr JOANN figueredo & now recommends for cardiac cath   Pt is Albanian speaking & is a poor historian.

## 2019-01-09 VITALS
HEART RATE: 70 BPM | DIASTOLIC BLOOD PRESSURE: 54 MMHG | SYSTOLIC BLOOD PRESSURE: 96 MMHG | RESPIRATION RATE: 17 BRPM | TEMPERATURE: 98 F | OXYGEN SATURATION: 97 %

## 2019-01-09 LAB
ANION GAP SERPL CALC-SCNC: 13 MMOL/L — SIGNIFICANT CHANGE UP (ref 5–17)
BUN SERPL-MCNC: 19 MG/DL — SIGNIFICANT CHANGE UP (ref 7–23)
CALCIUM SERPL-MCNC: 9.7 MG/DL — SIGNIFICANT CHANGE UP (ref 8.4–10.5)
CHLORIDE SERPL-SCNC: 98 MMOL/L — SIGNIFICANT CHANGE UP (ref 96–108)
CO2 SERPL-SCNC: 25 MMOL/L — SIGNIFICANT CHANGE UP (ref 22–31)
CREAT SERPL-MCNC: 0.76 MG/DL — SIGNIFICANT CHANGE UP (ref 0.5–1.3)
GLUCOSE SERPL-MCNC: 111 MG/DL — HIGH (ref 70–99)
HCT VFR BLD CALC: 41.8 % — SIGNIFICANT CHANGE UP (ref 34.5–45)
HGB BLD-MCNC: 14.2 G/DL — SIGNIFICANT CHANGE UP (ref 11.5–15.5)
MCHC RBC-ENTMCNC: 30.5 PG — SIGNIFICANT CHANGE UP (ref 27–34)
MCHC RBC-ENTMCNC: 34 GM/DL — SIGNIFICANT CHANGE UP (ref 32–36)
MCV RBC AUTO: 89.9 FL — SIGNIFICANT CHANGE UP (ref 80–100)
PLATELET # BLD AUTO: 280 K/UL — SIGNIFICANT CHANGE UP (ref 150–400)
POTASSIUM SERPL-MCNC: 3.6 MMOL/L — SIGNIFICANT CHANGE UP (ref 3.5–5.3)
POTASSIUM SERPL-SCNC: 3.6 MMOL/L — SIGNIFICANT CHANGE UP (ref 3.5–5.3)
RBC # BLD: 4.66 M/UL — SIGNIFICANT CHANGE UP (ref 3.8–5.2)
RBC # FLD: 12.9 % — SIGNIFICANT CHANGE UP (ref 10.3–14.5)
SODIUM SERPL-SCNC: 136 MMOL/L — SIGNIFICANT CHANGE UP (ref 135–145)
WBC # BLD: 8.6 K/UL — SIGNIFICANT CHANGE UP (ref 3.8–10.5)
WBC # FLD AUTO: 8.6 K/UL — SIGNIFICANT CHANGE UP (ref 3.8–10.5)

## 2019-01-09 RX ADMIN — Medication 25 MILLIGRAM(S): at 07:58

## 2019-01-09 RX ADMIN — Medication 81 MILLIGRAM(S): at 07:58

## 2019-01-09 RX ADMIN — SODIUM CHLORIDE 3 MILLILITER(S): 9 INJECTION INTRAMUSCULAR; INTRAVENOUS; SUBCUTANEOUS at 05:52

## 2019-01-09 RX ADMIN — CLOPIDOGREL BISULFATE 75 MILLIGRAM(S): 75 TABLET, FILM COATED ORAL at 07:58

## 2019-01-09 RX ADMIN — PANTOPRAZOLE SODIUM 40 MILLIGRAM(S): 20 TABLET, DELAYED RELEASE ORAL at 07:58

## 2019-01-09 NOTE — PROGRESS NOTE ADULT - SUBJECTIVE AND OBJECTIVE BOX
Patient is a 68y old  Female who presents with a chief complaint of abnormal stress test. Now S/P LHC to mRCA LULA X1      Allergies    No Known Allergies    Intolerances        Medications:  aspirin enteric coated 81 milliGRAM(s) Oral daily  atorvastatin 40 milliGRAM(s) Oral at bedtime  citalopram 20 milliGRAM(s) Oral daily  clopidogrel Tablet 75 milliGRAM(s) Oral daily  hydrochlorothiazide 25 milliGRAM(s) Oral daily  metoprolol succinate ER 25 milliGRAM(s) Oral daily  pantoprazole    Tablet 40 milliGRAM(s) Oral before breakfast  sodium chloride 0.9% lock flush 3 milliLiter(s) IV Push every 8 hours      Vitals:  T(C): 36.3 (19 @ 19:30), Max: 37.1 (19 @ 11:35)  HR: 74 (19 @ 19:30) (68 - 87)  BP: 102/54 (19 @ 19:30) (91/64 - 135/69)  BP(mean): 91 (19 @ 11:35) (91 - 91)  RR: 16 (19 @ 19:30) (16 - 20)  SpO2: 100% (19 @ 19:30) (96% - 100%)  Wt(kg): --  Daily Height in cm: 160 (2019 14:30)    Daily Weight in k (2019 14:30)  I&O's Summary    2019 07:01  -  2019 03:29  --------------------------------------------------------  IN: 880 mL / OUT: 850 mL / NET: 30 mL      Physical Exam:  Appearance: Normal  Eyes: PERRL, EOMI  HENT: Normal oral muscosa, (+0 slurred speech,  NC/AT  Cardiovascular: S1S2, RRR, No M/R/G, no JVD, No Lower extremity edema  Procedural Access Site: Right radial access site with No hematoma, Non-tender to palpation, 2+ pulse, No bruit, No Ecchymosis  Respiratory: Clear to auscultation bilaterally  Gastrointestinal: Soft, Non tender, Normal Bowel Sounds  Musculoskeletal: No clubbing, No joint deformity   Neurologic: Non-focal  Skin: No rashes, No ecchymoses, No cyanosis        136  |  98  |  19  ----------------------------<  111<H>  3.6   |  25  |  0.76    Ca    9.7      2019 01:21    TPro  8.1  /  Alb  4.3  /  TBili  0.6  /  DBili  x   /  AST  18  /  ALT  10  /  AlkPhos  55            ECG:      Interpretation of Telemetry:   ASSESSMENT/PLAN:    67y/o female presents for preop  eval for complete rotator cuff tear, scheduled right shoulder major debridement, subacromial decompression acromioplasty orthoscopic rotator cuff repair biceps tenotomy.  Pt with c/o right shoulder pain for several months & left sided chest pressure on exertion, that has progressively worsened. s/p echo on 18  showed normal lv size func with EF~65% no regional wall motion abnormalities, stress test revealing mild apical ischemia seen & evaluated by Dr JOANN figueredo & recommended for cardiac cath. Pt now S/P   LHC to mRCA LULA X1 via Right radial access. Pt tolerated procedure well. Post PCI EKG without acute changes. Right radial access site benign without presence od bleeding/hematoma. Pt without complaints. Right radial pulse palpable. Pt remains hemodynamically stable  and her hospital course was otherwise uneventful. Pt is now medically stable for discharge. Patient is a 68y old  Female who presents with a chief complaint of abnormal stress test. Now S/P LHC to mRCA LULA X1      Allergies    No Known Allergies    Intolerances        Medications:  aspirin enteric coated 81 milliGRAM(s) Oral daily  atorvastatin 40 milliGRAM(s) Oral at bedtime  citalopram 20 milliGRAM(s) Oral daily  clopidogrel Tablet 75 milliGRAM(s) Oral daily  hydrochlorothiazide 25 milliGRAM(s) Oral daily  metoprolol succinate ER 25 milliGRAM(s) Oral daily  pantoprazole    Tablet 40 milliGRAM(s) Oral before breakfast  sodium chloride 0.9% lock flush 3 milliLiter(s) IV Push every 8 hours      Vitals:  T(C): 36.3 (19 @ 19:30), Max: 37.1 (19 @ 11:35)  HR: 74 (19 @ 19:30) (68 - 87)  BP: 102/54 (19 @ 19:30) (91/64 - 135/69)  BP(mean): 91 (19 @ 11:35) (91 - 91)  RR: 16 (19 @ 19:30) (16 - 20)  SpO2: 100% (19 @ 19:30) (96% - 100%)  Wt(kg): --  Daily Height in cm: 160 (2019 14:30)    Daily Weight in k (2019 14:30)  I&O's Summary    2019 07:01  -  2019 03:29  --------------------------------------------------------  IN: 880 mL / OUT: 850 mL / NET: 30 mL      Physical Exam:  Appearance: Normal  Eyes: PERRL, EOMI  HENT: Normal oral muscosa, (+0 slurred speech,  NC/AT  Cardiovascular: S1S2, RRR, No M/R/G, no JVD, No Lower extremity edema  Procedural Access Site: Right radial access site with No hematoma, Non-tender to palpation, 2+ pulse, No bruit, No Ecchymosis  Respiratory: Clear to auscultation bilaterally  Gastrointestinal: Soft, Non tender, Normal Bowel Sounds  Musculoskeletal: No clubbing, No joint deformity   Neurologic: Non-focal  Skin: No rashes, No ecchymoses, No cyanosis        136  |  98  |  19  ----------------------------<  111<H>  3.6   |  25  |  0.76    Ca    9.7      2019 01:21    TPro  8.1  /  Alb  4.3  /  TBili  0.6  /  DBili  x   /  AST  18  /  ALT  10  /  AlkPhos  55            ECG: Sinus at 64bpm      Interpretation of Telemetry:   ASSESSMENT:    69y/o female presents for preop  eval for complete rotator cuff tear, scheduled right shoulder major debridement, subacromial decompression acromioplasty orthoscopic rotator cuff repair biceps tenotomy.  Pt with c/o right shoulder pain for several months & left sided chest pressure on exertion, that has progressively worsened. s/p echo on 18  showed normal lv size func with EF~65% no regional wall motion abnormalities, stress test revealing mild apical ischemia seen & evaluated by Dr JOANN figueredo & recommended for cardiac cath. Pt now S/P   PCI LULA X1 to mRCA via Right radial access.     PLAN:  1. Continue DAPT -Statin  2. D/C home  if stable Patient is a 68y old  Female who presents with a chief complaint of abnormal stress test. Now S/P LHC to mRCA LULA X1      Allergies    No Known Allergies    Intolerances        Medications:  aspirin enteric coated 81 milliGRAM(s) Oral daily  atorvastatin 40 milliGRAM(s) Oral at bedtime  citalopram 20 milliGRAM(s) Oral daily  clopidogrel Tablet 75 milliGRAM(s) Oral daily  hydrochlorothiazide 25 milliGRAM(s) Oral daily  metoprolol succinate ER 25 milliGRAM(s) Oral daily  pantoprazole    Tablet 40 milliGRAM(s) Oral before breakfast  sodium chloride 0.9% lock flush 3 milliLiter(s) IV Push every 8 hours      Vitals:  T(C): 36.3 (19 @ 19:30), Max: 37.1 (19 @ 11:35)  HR: 74 (19 @ 19:30) (68 - 87)  BP: 102/54 (19 @ 19:30) (91/64 - 135/69)  BP(mean): 91 (19 @ 11:35) (91 - 91)  RR: 16 (19 @ 19:30) (16 - 20)  SpO2: 100% (19 @ 19:30) (96% - 100%)  Wt(kg): --  Daily Height in cm: 160 (2019 14:30)    Daily Weight in k (2019 14:30)  I&O's Summary    2019 07:01  -  2019 03:29  --------------------------------------------------------  IN: 880 mL / OUT: 850 mL / NET: 30 mL      Physical Exam:  Appearance: Normal  Eyes: PERRL, EOMI  HENT: Normal oral muscosa, (+0 slurred speech,  NC/AT  Cardiovascular: S1S2, RRR, No M/R/G, no JVD, No Lower extremity edema  Procedural Access Site: Right radial access site with No hematoma, Non-tender to palpation, 2+ pulse, No bruit, No Ecchymosis  Respiratory: Clear to auscultation bilaterally  Gastrointestinal: Soft, Non tender, Normal Bowel Sounds  Musculoskeletal: No clubbing, No joint deformity   Neurologic: Non-focal  Skin: No rashes, No ecchymoses, No cyanosis        136  |  98  |  19  ----------------------------<  111<H>  3.6   |  25  |  0.76    Ca    9.7      2019 01:21    TPro  8.1  /  Alb  4.3  /  TBili  0.6  /  DBili  x   /  AST  18  /  ALT  10  /  AlkPhos  55            ECG: Sinus at 64bpm      Interpretation of Telemetry: Sinus 60's and 70's     ASSESSMENT:    67y/o female presents for preop  eval for complete rotator cuff tear, scheduled right shoulder major debridement, subacromial decompression acromioplasty orthoscopic rotator cuff repair biceps tenotomy.  Pt with c/o right shoulder pain for several months & left sided chest pressure on exertion, that has progressively worsened. s/p echo on 18  showed normal lv size func with EF~65% no regional wall motion abnormalities, stress test revealing mild apical ischemia seen & evaluated by Dr JOANN figueredo & recommended for cardiac cath. Pt now S/P   PCI LULA X1 to mRCA via Right radial access.     PLAN:  1. Continue DAPT -Statin  2. D/C home  if stable

## 2019-03-07 ENCOUNTER — APPOINTMENT (OUTPATIENT)
Dept: NEUROLOGY | Facility: CLINIC | Age: 69
End: 2019-03-07
Payer: MEDICARE

## 2019-03-07 VITALS
TEMPERATURE: 99.1 F | SYSTOLIC BLOOD PRESSURE: 120 MMHG | WEIGHT: 156 LBS | OXYGEN SATURATION: 97 % | DIASTOLIC BLOOD PRESSURE: 70 MMHG | HEART RATE: 77 BPM | HEIGHT: 63 IN | RESPIRATION RATE: 17 BRPM | BODY MASS INDEX: 27.64 KG/M2

## 2019-03-07 PROCEDURE — 99204 OFFICE O/P NEW MOD 45 MIN: CPT

## 2019-03-07 NOTE — CONSULT LETTER
[Dear  ___] : Dear  [unfilled], [Consult Letter:] : I had the pleasure of evaluating your patient, [unfilled]. [Please see my note below.] : Please see my note below. [Consult Closing:] : Thank you very much for allowing me to participate in the care of this patient.  If you have any questions, please do not hesitate to contact me. [Sincerely,] : Sincerely, [FreeTextEntry2] : Dr Ozuna

## 2019-03-07 NOTE — HISTORY OF PRESENT ILLNESS
[FreeTextEntry1] : 68-year-old woman was hospitalized at [facility] 6 years ago with severe depression. MRI of the brain showed a signal abnormality in the right delfino. She was placed on risperidone and citalopram. She remained on risperidone for approximately one year and was noted to have a tardive dyskinesia affecting mouth movements. A trial of trihexyphenidyl was of no benefit. She had a repeat MRI of the brain 5 years ago which showed no change from the 4 mm right pontine abnormal signal.\par \par She has a past history of hypertension.  2 months ago she had a cardiac stent placed.\par

## 2019-03-07 NOTE — ASSESSMENT
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

## 2019-03-07 NOTE — PHYSICAL EXAM
[Person] : oriented to person [Place] : oriented to place [Time] : oriented to time [Short Term Intact] : short term memory intact [Remote Intact] : remote memory intact [Registration Intact] : recent registration memory intact [Concentration Intact] : normal concentrating ability [Visual Intact] : visual attention was ~T not ~L decreased [Naming Objects] : no difficulty naming common objects [Repeating Phrases] : no difficulty repeating a phrase [Writing A Sentence] : no difficulty writing a sentence [Fluency] : fluency intact [Comprehension] : comprehension intact [Reading] : reading intact [Current Events] : adequate knowledge of current events [Past History] : adequate knowledge of personal past history [Vocabulary] : adequate range of vocabulary [Cranial Nerves Optic (II)] : visual acuity intact bilaterally,  visual fields full to confrontation, pupils equal round and reactive to light [Cranial Nerves Oculomotor (III)] : extraocular motion intact [Cranial Nerves Trigeminal (V)] : facial sensation intact symmetrically [Cranial Nerves Facial (VII)] : face symmetrical [Cranial Nerves Vestibulocochlear (VIII)] : hearing was intact bilaterally [Cranial Nerves Glossopharyngeal (IX)] : tongue and palate midline [Cranial Nerves Accessory (XI - Cranial And Spinal)] : head turning and shoulder shrug symmetric [Cranial Nerves Hypoglossal (XII)] : there was no tongue deviation with protrusion [Motor Tone] : muscle tone was normal in all four extremities [Motor Strength] : muscle strength was normal in all four extremities [No Muscle Atrophy] : normal bulk in all four extremities [Sensation Tactile Decrease] : light touch was intact [Abnormal Walk] : normal gait [Balance] : balance was intact [Past-pointing] : there was no past-pointing [Tremor] : no tremor present [2+] : Ankle jerk left 2+ [Plantar Reflex Right Only] : normal on the right [Plantar Reflex Left Only] : normal on the left [FreeTextEntry1] : She has buccolingual dyskinesia when she attempts to speak. No other dyskinetic movements. No tremor or rigidity. Gait is normal.  [Heart Rate And Rhythm] : heart rate was normal and rhythm regular [Heart Sounds] : normal S1 and S2 [Heart Sounds Gallop] : no gallops [Murmurs] : no murmurs [Heart Sounds Pericardial Friction Rub] : no pericardial rub [Arterial Pulses Carotid] : carotid pulses were normal with no bruits

## 2019-03-11 ENCOUNTER — INPATIENT (INPATIENT)
Facility: HOSPITAL | Age: 69
LOS: 0 days | Discharge: HOME | DRG: 313 | End: 2019-03-12
Attending: STUDENT IN AN ORGANIZED HEALTH CARE EDUCATION/TRAINING PROGRAM | Admitting: INTERNAL MEDICINE
Payer: COMMERCIAL

## 2019-03-11 VITALS
RESPIRATION RATE: 15 BRPM | DIASTOLIC BLOOD PRESSURE: 91 MMHG | OXYGEN SATURATION: 98 % | HEIGHT: 63 IN | HEART RATE: 75 BPM | TEMPERATURE: 99 F | WEIGHT: 160.06 LBS | SYSTOLIC BLOOD PRESSURE: 160 MMHG

## 2019-03-11 DIAGNOSIS — Z98.891 HISTORY OF UTERINE SCAR FROM PREVIOUS SURGERY: Chronic | ICD-10-CM

## 2019-03-11 DIAGNOSIS — I20.0 UNSTABLE ANGINA: ICD-10-CM

## 2019-03-11 LAB
ALBUMIN SERPL ELPH-MCNC: 3.3 G/DL — SIGNIFICANT CHANGE UP (ref 3.3–5)
ALP SERPL-CCNC: 60 U/L — SIGNIFICANT CHANGE UP (ref 40–120)
ALT FLD-CCNC: 19 U/L DA — SIGNIFICANT CHANGE UP (ref 10–45)
ANION GAP SERPL CALC-SCNC: 12 MMOL/L — SIGNIFICANT CHANGE UP (ref 5–17)
AST SERPL-CCNC: 27 U/L — SIGNIFICANT CHANGE UP (ref 10–40)
BILIRUB SERPL-MCNC: 0.4 MG/DL — SIGNIFICANT CHANGE UP (ref 0.2–1.2)
BUN SERPL-MCNC: 18 MG/DL — SIGNIFICANT CHANGE UP (ref 7–23)
CALCIUM SERPL-MCNC: 9.2 MG/DL — SIGNIFICANT CHANGE UP (ref 8.4–10.5)
CHLORIDE SERPL-SCNC: 98 MMOL/L — SIGNIFICANT CHANGE UP (ref 96–108)
CO2 SERPL-SCNC: 25 MMOL/L — SIGNIFICANT CHANGE UP (ref 22–31)
CREAT SERPL-MCNC: 0.63 MG/DL — SIGNIFICANT CHANGE UP (ref 0.5–1.3)
GLUCOSE SERPL-MCNC: 128 MG/DL — HIGH (ref 70–99)
HCT VFR BLD CALC: 38 % — SIGNIFICANT CHANGE UP (ref 34.5–45)
HGB BLD-MCNC: 12.1 G/DL — SIGNIFICANT CHANGE UP (ref 11.5–15.5)
MCHC RBC-ENTMCNC: 30.2 PG — SIGNIFICANT CHANGE UP (ref 27–34)
MCHC RBC-ENTMCNC: 31.8 GM/DL — LOW (ref 32–36)
MCV RBC AUTO: 95.1 FL — SIGNIFICANT CHANGE UP (ref 80–100)
PLATELET # BLD AUTO: 265 K/UL — SIGNIFICANT CHANGE UP (ref 150–400)
POTASSIUM SERPL-MCNC: 4 MMOL/L — SIGNIFICANT CHANGE UP (ref 3.5–5.3)
POTASSIUM SERPL-SCNC: 4 MMOL/L — SIGNIFICANT CHANGE UP (ref 3.5–5.3)
PROT SERPL-MCNC: 8 G/DL — SIGNIFICANT CHANGE UP (ref 6–8.3)
RBC # BLD: 3.99 M/UL — SIGNIFICANT CHANGE UP (ref 3.8–5.2)
RBC # FLD: 13.9 % — SIGNIFICANT CHANGE UP (ref 10.3–14.5)
SODIUM SERPL-SCNC: 135 MMOL/L — SIGNIFICANT CHANGE UP (ref 135–145)
TROPONIN I SERPL-MCNC: <.017 NG/ML — LOW (ref 0.02–0.06)
WBC # BLD: 6.5 K/UL — SIGNIFICANT CHANGE UP (ref 3.8–10.5)
WBC # FLD AUTO: 6.5 K/UL — SIGNIFICANT CHANGE UP (ref 3.8–10.5)

## 2019-03-11 PROCEDURE — 99222 1ST HOSP IP/OBS MODERATE 55: CPT

## 2019-03-11 PROCEDURE — 93306 TTE W/DOPPLER COMPLETE: CPT | Mod: 26

## 2019-03-11 PROCEDURE — 99285 EMERGENCY DEPT VISIT HI MDM: CPT

## 2019-03-11 PROCEDURE — 71046 X-RAY EXAM CHEST 2 VIEWS: CPT | Mod: 26

## 2019-03-11 PROCEDURE — 99223 1ST HOSP IP/OBS HIGH 75: CPT

## 2019-03-11 PROCEDURE — 93010 ELECTROCARDIOGRAM REPORT: CPT

## 2019-03-11 RX ORDER — FAMOTIDINE 10 MG/ML
20 INJECTION INTRAVENOUS DAILY
Qty: 0 | Refills: 0 | Status: DISCONTINUED | OUTPATIENT
Start: 2019-03-11 | End: 2019-03-12

## 2019-03-11 RX ORDER — ATORVASTATIN CALCIUM 80 MG/1
1 TABLET, FILM COATED ORAL
Qty: 0 | Refills: 0 | COMMUNITY

## 2019-03-11 RX ORDER — CITALOPRAM 10 MG/1
20 TABLET, FILM COATED ORAL DAILY
Qty: 0 | Refills: 0 | Status: DISCONTINUED | OUTPATIENT
Start: 2019-03-11 | End: 2019-03-11

## 2019-03-11 RX ORDER — ATORVASTATIN CALCIUM 80 MG/1
40 TABLET, FILM COATED ORAL AT BEDTIME
Qty: 0 | Refills: 0 | Status: DISCONTINUED | OUTPATIENT
Start: 2019-03-11 | End: 2019-03-12

## 2019-03-11 RX ORDER — TRAMADOL HYDROCHLORIDE 50 MG/1
50 TABLET ORAL EVERY 8 HOURS
Qty: 0 | Refills: 0 | Status: DISCONTINUED | OUTPATIENT
Start: 2019-03-11 | End: 2019-03-12

## 2019-03-11 RX ORDER — ASPIRIN/CALCIUM CARB/MAGNESIUM 324 MG
81 TABLET ORAL DAILY
Qty: 0 | Refills: 0 | Status: DISCONTINUED | OUTPATIENT
Start: 2019-03-11 | End: 2019-03-12

## 2019-03-11 RX ORDER — ENOXAPARIN SODIUM 100 MG/ML
40 INJECTION SUBCUTANEOUS DAILY
Qty: 0 | Refills: 0 | Status: DISCONTINUED | OUTPATIENT
Start: 2019-03-11 | End: 2019-03-12

## 2019-03-11 RX ORDER — ESCITALOPRAM OXALATE 10 MG/1
10 TABLET, FILM COATED ORAL DAILY
Qty: 0 | Refills: 0 | Status: DISCONTINUED | OUTPATIENT
Start: 2019-03-11 | End: 2019-03-11

## 2019-03-11 RX ORDER — METOPROLOL TARTRATE 50 MG
25 TABLET ORAL DAILY
Qty: 0 | Refills: 0 | Status: DISCONTINUED | OUTPATIENT
Start: 2019-03-11 | End: 2019-03-12

## 2019-03-11 RX ORDER — ESCITALOPRAM OXALATE 10 MG/1
20 TABLET, FILM COATED ORAL DAILY
Qty: 0 | Refills: 0 | Status: DISCONTINUED | OUTPATIENT
Start: 2019-03-11 | End: 2019-03-11

## 2019-03-11 RX ORDER — CLOPIDOGREL BISULFATE 75 MG/1
75 TABLET, FILM COATED ORAL DAILY
Qty: 0 | Refills: 0 | Status: DISCONTINUED | OUTPATIENT
Start: 2019-03-11 | End: 2019-03-12

## 2019-03-11 RX ORDER — OMEPRAZOLE 10 MG/1
1 CAPSULE, DELAYED RELEASE ORAL
Qty: 0 | Refills: 0 | COMMUNITY

## 2019-03-11 RX ORDER — FAMOTIDINE 10 MG/ML
20 INJECTION INTRAVENOUS EVERY 24 HOURS
Qty: 0 | Refills: 0 | Status: DISCONTINUED | OUTPATIENT
Start: 2019-03-11 | End: 2019-03-11

## 2019-03-11 RX ADMIN — Medication 81 MILLIGRAM(S): at 19:28

## 2019-03-11 NOTE — CONSULT NOTE ADULT - SUBJECTIVE AND OBJECTIVE BOX
Chief Complaint: chest pain    HPI:68 yr old woman who had severe lad disease  and underwent yvette placement in January. her symptoms at that tiome were sob and fatigue with exertion. since the time of the stent she has had daily stabbing pains in the middle of her chest that usually last one second at a time. today the stabbing didnt go awy and lasted for approx 1/2 hour and she came to er. still state she has these mike but they are mild    PMH:   Acid reflux  Rotator cuff tear  Hypertension  Stroke  Depression    PSH:   H/O  section    Family History:  FAMILY HISTORY:  uncle with cad  brother with cmp      Social History:  Smoking: not for many yrs  Alcohol:rare  Drugs:no    Allergies:  No Known Allergies      Medications:  aspirin enteric coated 81 milliGRAM(s) Oral daily  atorvastatin 40 milliGRAM(s) Oral at bedtime  clopidogrel Tablet 75 milliGRAM(s) Oral daily  enoxaparin Injectable 40 milliGRAM(s) SubCutaneous daily  escitalopram 20 milliGRAM(s) Oral daily  famotidine  IVPB 20 milliGRAM(s) IV Intermittent daily  metoprolol succinate ER 25 milliGRAM(s) Oral daily  traMADol 50 milliGRAM(s) Oral every 8 hours PRN      REVIEW OF SYSTEMS:  CONSTITUTIONAL: No fever, weight loss, or fatigue  EYES: No eye pain, visual disturbances, or discharge  ENMT:  No difficulty hearing, tinnitus, vertigo; No sinus or throat pain  NECK: No pain or stiffness  BREASTS: No pain, masses, or nipple discharge  RESPIRATORY: No cough, wheezing, chills or hemoptysis; No shortness of breath  CARDIOVASCULAR: No chest pain, palpitations, dizziness, or leg swelling  GASTROINTESTINAL: No abdominal or epigastric pain. No nausea, vomiting, or hematemesis; No diarrhea or constipation. No melena or hematochezia.  GENITOURINARY: No dysuria, frequency, hematuria, or incontinence  NEUROLOGICAL: No headaches, memory loss, loss of strength, numbness, or tremors  SKIN: No itching, burning, rashes, or lesions   LYMPH NODES: No enlarged glands  ENDOCRINE: No heat or cold intolerance; No hair loss  MUSCULOSKELETAL: No joint pain or swelling; No muscle, back, or extremity pain  PSYCHIATRIC: No depression, anxiety, mood swings, or difficulty sleeping  HEME/LYMPH: No easy bruising, or bleeding gums  ALLERY AND IMMUNOLOGIC: No hives or eczema    Physical Exam:  T(C): 37.1 (19 @ 11:48), Max: 37.1 (19 @ 11:48)  HR: 75 (19 @ 11:48) (75 - 75)  BP: 160/91 (19 @ 11:48) (160/91 - 160/91)  RR: 15 (19 @ 11:48) (15 - 15)  SpO2: 98% (19 @ 11:48) (98% - 98%)  Wt(kg): --    GENERAL: NAD, well-groomed, well-developed  HEAD:  Atraumatic, Normocephalic  EYES: EOMI, conjunctiva and sclera clear  ENT: Moist mucous membranes,  NECK: Supple, No JVD, no bruits  CHEST/LUNG: Clear to percussion bilaterally; No rales, rhonchi, wheezing, or rubs  HEART: Regular rate and rhythm; No murmurs, rubs, or gallops PMI non displaced.  ABDOMEN: Soft, Nontender, Nondistended; Bowel sounds present  EXTREMITIES:  2+ Peripheral Pulses, No clubbing, cyanosis, or edema  SKIN: No rashes or lesions  NERVOUS SYSTEM:  Alert & Oriented X3, Good concentration; Motor Strength 5/5 B/L upper and lower extremities; DTRs 2+ intact and symmetric    Cardiovascular Diagnostic Testing:  ECG:x2 no acute abnormalities    Labs:                        12.1   6.5   )-----------( 265      ( 11 Mar 2019 12:11 )             38.0     03-11    135  |  98  |  18  ----------------------------<  128<H>  4.0   |  25  |  0.63    Ca    9.2      11 Mar 2019 12:11    TPro  8.0  /  Alb  3.3  /  TBili  0.4  /  DBili  x   /  AST  27  /  ALT  19  /  AlkPhos  60  03-11      CARDIAC MARKERS ( 11 Mar 2019 12:11 )  <.017 ng/mL / x     / x     / x     / x          cxr-normal          Imaging:

## 2019-03-11 NOTE — CONSULT NOTE ADULT - ASSESSMENT
Imp  ATYPICAL CP IN PATIENT WITH RECENT STENT  ATYPICALITY OF SYMPTOMS  NEGATIVE EKG AND NEGATIVE TROPONIN MAKE ACUTE STENT THROMBOSIS OR RESTENOSIS UNLIKELY    SUGGEST  F/U EKG AND TROPONIN  OBSERVE X24 HOURS  ECHO TO R/O PERICARDIAL EFFUSION

## 2019-03-11 NOTE — ED ADULT NURSE NOTE - OBJECTIVE STATEMENT
Patient reports having chest pain for several months, but never was evaluated for it. Patient denies any shortness of breath, palpitations, dizziness of fevers.

## 2019-03-11 NOTE — ED ADULT NURSE NOTE - NSIMPLEMENTINTERV_GEN_ALL_ED
Implemented All Universal Safety Interventions:  [place] to call system. Call bell, personal items and telephone within reach. Instruct patient to call for assistance. Room bathroom lighting operational. Non-slip footwear when patient is off stretcher. Physically safe environment: no spills, clutter or unnecessary equipment. Stretcher in lowest position, wheels locked, appropriate side rails in place.

## 2019-03-11 NOTE — ED ADULT TRIAGE NOTE - CHIEF COMPLAINT QUOTE
Pt c/o chest pain since 0800 this AM. Pt has hx of HTN and a stent placed in January. Per home health aid pt is at baseline neuro status and speech is normal for pt.

## 2019-03-11 NOTE — ED ADULT NURSE NOTE - NS ED NURSE LEVEL OF CONSCIOUSNESS SPEECH
Speaking Coherently Mucosal Advancement Flap Text: Given the location of the defect, shape of the defect and the proximity to free margins a mucosal advancement flap was deemed most appropriate. Incisions were made with a 15 blade scalpel in the appropriate fashion along the cutaneous vermilion border and the mucosal lip. The remaining actinically damaged mucosal tissue was excised.  The mucosal advancement flap was then elevated to the gingival sulcus with care taken to preserve the neurovascular structures and advanced into the primary defect. Care was taken to ensure that precise realignment of the vermilion border was achieved.

## 2019-03-11 NOTE — H&P ADULT - HISTORY OF PRESENT ILLNESS
67 yo f with pmh CAD s/p pci done 1-2019 presents today with chest pain. as per patient she had sudden onset of chest pain at 10 am today while she was resting. Pt  co pain 5/10 constant worse with movement. patient denies any sob, palpitations no previous episodes of this pain in the past. no n/v/dizziness, headache.

## 2019-03-11 NOTE — H&P ADULT - NSHPPHYSICALEXAM_GEN_ALL_CORE
PHYSICAL EXAM:  GENERAL: NAD,    HEAD:  Atraumatic, Normocephalic  EYES: EOMI, PERRL, conjunctiva and sclera clear  ENMT: No tonsillar erythema, exudates, or enlargement; Moist mucous membranes, Good dentition  NECK: Supple, No JVD  CHEST/LUNG: Clear to auscultation bilaterally; No rales, rhonchi, wheezing, or rubs. chest pain is reproducible on exam   HEART: Regular rate and rhythm; S1/S2, No murmurs, rubs, or gallops  ABDOMEN: Soft, Nontender, Nondistended; Bowel sounds present  VASCULAR: Normal pulses, Normal capillary refill  EXTREMITIES:  2+ Peripheral Pulses, No clubbing, cyanosis, or edema     NERVOUS SYSTEM; CN 2-12 intact, No focal deficits

## 2019-03-11 NOTE — H&P ADULT - ASSESSMENT
69 yo f with pmh CAD s/p pci 1-2019 presents with chest pain.    1- chest pain: r/o ACS most likely musculoskeletal pain. admit to tele, c/w tele monitoring, cardio consulted. ekg shows no st segment changes. start tramadol 50mg q 8   2- CAD s/p PCI: c/w asa, plavix, statin and metoprolol  3- gerd: resume h2 blocker  4. dvt ppx: lovenox

## 2019-03-11 NOTE — H&P ADULT - NSHPREVIEWOFSYSTEMS_GEN_ALL_CORE
REVIEW OF SYSTEM:    Constitutional: No fever, chills, fatigue  Neuro: No headache, numbness, weakness  Resp: No cough, wheezing, shortness of breath  CVS: No  palpitations, leg swelling  GI: No abdominal pain, nausea, vomiting, diarrhea   : No dysuria, frequency, incontinence  Skin: No itching, burning, rashes, or lesions   Msk: No joint pain or swelling  Psych: No depression, anxiety, mood swings

## 2019-03-11 NOTE — H&P ADULT - NSHPLABSRESULTS_GEN_ALL_CORE
12.1   6.5   )-----------( 265      ( 11 Mar 2019 12:11 )             38.0       03-11    135  |  98  |  18  ----------------------------<  128<H>  4.0   |  25  |  0.63    Ca    9.2      11 Mar 2019 12:11    TPro  8.0  /  Alb  3.3  /  TBili  0.4  /  DBili  x   /  AST  27  /  ALT  19  /  AlkPhos  60  03-11          CARDIAC MARKERS ( 11 Mar 2019 12:11 )  <.017 ng/mL / x     / x     / x     / x      IMPROVE VTE Individual Risk Assessment          RISK                                                          Points  [  ] Previous VTE                                                3  [  ] Thrombophilia                                             2  [  ] Lower limb paralysis                                   2        (unable to hold up >15 seconds)    [  ] Current Cancer                                             2         (within 6 months)  [  ] Immobilization > 24 hrs                              1  [  ] ICU/CCU stay > 24 hours                             1  [  x] Age > 60                                                         1    IMPROVE VTE Score:         [      1   ]

## 2019-03-11 NOTE — ED PROVIDER NOTE - CLINICAL SUMMARY MEDICAL DECISION MAKING FREE TEXT BOX
Pt with angioplasty las in Jan presents with chest pain first trop neg chest pain resolved spontaneously

## 2019-03-11 NOTE — ED PROVIDER NOTE - OBJECTIVE STATEMENT
68 year old F cad status post angioplasty in Jan presents with chest pain on and off for the last 24 hours. Pt woke up today with chest pain which lasted thirty minutes which is decreasing. Pt denies shortness of breath.

## 2019-03-12 ENCOUNTER — TRANSCRIPTION ENCOUNTER (OUTPATIENT)
Age: 69
End: 2019-03-12

## 2019-03-12 VITALS
RESPIRATION RATE: 15 BRPM | TEMPERATURE: 98 F | HEART RATE: 78 BPM | WEIGHT: 192.24 LBS | DIASTOLIC BLOOD PRESSURE: 72 MMHG | SYSTOLIC BLOOD PRESSURE: 136 MMHG | OXYGEN SATURATION: 97 %

## 2019-03-12 LAB
HCV AB S/CO SERPL IA: 0.09 S/CO — SIGNIFICANT CHANGE UP (ref 0–0.79)
HCV AB SERPL-IMP: SIGNIFICANT CHANGE UP

## 2019-03-12 PROCEDURE — 93016 CV STRESS TEST SUPVJ ONLY: CPT

## 2019-03-12 PROCEDURE — 99233 SBSQ HOSP IP/OBS HIGH 50: CPT | Mod: 25

## 2019-03-12 PROCEDURE — 93018 CV STRESS TEST I&R ONLY: CPT

## 2019-03-12 RX ADMIN — Medication 81 MILLIGRAM(S): at 12:54

## 2019-03-12 RX ADMIN — CLOPIDOGREL BISULFATE 75 MILLIGRAM(S): 75 TABLET, FILM COATED ORAL at 12:54

## 2019-03-12 RX ADMIN — Medication 25 MILLIGRAM(S): at 05:21

## 2019-03-12 RX ADMIN — ENOXAPARIN SODIUM 40 MILLIGRAM(S): 100 INJECTION SUBCUTANEOUS at 12:54

## 2019-03-12 RX ADMIN — ATORVASTATIN CALCIUM 40 MILLIGRAM(S): 80 TABLET, FILM COATED ORAL at 05:21

## 2019-03-12 RX ADMIN — FAMOTIDINE 100 MILLIGRAM(S): 10 INJECTION INTRAVENOUS at 12:53

## 2019-03-12 NOTE — PROGRESS NOTE ADULT - SUBJECTIVE AND OBJECTIVE BOX
Follow up for atypicl chaes pains  SUBJ:    no chest pains a plain stress today is satisfactory       PMH  Acid reflux  Rotator cuff tear  Hypertension  Stroke  Depression      MEDICATIONS  (STANDING):  aspirin enteric coated 81 milliGRAM(s) Oral daily  atorvastatin 40 milliGRAM(s) Oral at bedtime  clopidogrel Tablet 75 milliGRAM(s) Oral daily  enoxaparin Injectable 40 milliGRAM(s) SubCutaneous daily  famotidine  IVPB 20 milliGRAM(s) IV Intermittent daily  metoprolol succinate ER 25 milliGRAM(s) Oral daily    MEDICATIONS  (PRN):  traMADol 50 milliGRAM(s) Oral every 8 hours PRN Mild Pain (1 - 3)        PHYSICAL EXAM:  Vital Signs Last 24 Hrs  T(C): 36.7 (12 Mar 2019 08:38), Max: 36.8 (11 Mar 2019 17:58)  T(F): 98 (12 Mar 2019 08:38), Max: 98.2 (11 Mar 2019 17:58)  HR: 78 (12 Mar 2019 08:38) (71 - 78)  BP: 136/72 (12 Mar 2019 08:38) (111/52 - 136/72)  BP(mean): --  RR: 15 (12 Mar 2019 08:38) (15 - 16)  SpO2: 97% (12 Mar 2019 08:38) (96% - 98%)    GENERAL: NAD, well-groomed, well-developed  HEAD:  Atraumatic, Normocephalic  EYES: EOMI, PERRLA, conjunctiva and sclera clear  ENT: Moist mucous membranes,  NECK: Supple, No JVD, no bruits  CHEST/LUNG: Clear to percussion bilaterally; No rales, rhonchi, wheezing, or rubs  HEART: Regular rate and rhythm; No murmurs, rubs, or gallops PMI non displaced.  ABDOMEN: Soft, Nontender, Nondistended; Bowel sounds present  EXTREMITIES:  2+ Peripheral Pulses, No clubbing, cyanosis, or edema  SKIN: No rashes or lesions  NERVOUS SYSTEM:  Cranial Nerves II-XII intact      TELEMETRY:    ECG:    < from: 12 Lead ECG (03.11.19 @ 13:39) >  Ventricular Rate 67 BPM    Atrial Rate 67 BPM    P-R Interval 150 ms    QRS Duration 82 ms    Q-T Interval 394 ms    QTC Calculation(Bezet) 416 ms    P Axis 45 degrees    R Axis 17 degrees    T Axis 31 degrees    Diagnosis Line Normal sinus rhythm  Poor R wave progression  Possible Anterior infarct (cited on or before 11-MAR-2019)  Abnormal ECG  When compared with ECG of 11-MAR-2019 11:55,  No significant change was found    Confirmed by DONALD VERAS MD ([id number]) on 3/12/2019 8:32:26 AM    < end of copied text >    ECHO:    < from: TTE Echo Complete w/Doppler (03.11.19 @ 15:47) >  Summary:   1. Left ventricular ejection fraction, by visual estimation, is 60 to   65%.   2. Normal global left ventricular systolic function.   3. Spectral Doppler shows impaired relaxation pattern of left   ventricular myocardial filling (Grade I diastolic dysfunction).   4. There is borderline septal left ventricular hypertrophy.  5. There is no evidence of pericardial effusion.   6. Trace tricuspid regurgitation.   7. Trace pulmonic valve regurgitation.    [id number] Oswald Bah MD, [facility] , Electronically signed on 3/11/2019 at   4:48:46 PM         *** Final ***        OSWALD BAH   This document has been electronically signed. Mar 11 2019  3:47PM    < end of copied text >      LABS:                        12.1   6.5   )-----------( 265      ( 11 Mar 2019 12:11 )             38.0     03-11    135  |  98  |  18  ----------------------------<  128<H>  4.0   |  25  |  0.63    Ca    9.2      11 Mar 2019 12:11    TPro  8.0  /  Alb  3.3  /  TBili  0.4  /  DBili  x   /  AST  27  /  ALT  19  /  AlkPhos  60  03-11    CARDIAC MARKERS ( 11 Mar 2019 21:15 )  <.017 ng/mL / x     / x     / x     / x      CARDIAC MARKERS ( 11 Mar 2019 18:35 )  <.017 ng/mL / x     / x     / x     / x      CARDIAC MARKERS ( 11 Mar 2019 12:11 )  <.017 ng/mL / x     / x     / x     / x        I&O's Summary    11 Mar 2019 07:01  -  12 Mar 2019 07:00  --------------------------------------------------------  IN: 120 mL / OUT: 2 mL / NET: 118 mL    12 Mar 2019 07:01  -  12 Mar 2019 14:45  --------------------------------------------------------  IN: 760 mL / OUT: 0 mL / NET: 760 mL      BNP    RADIOLOGY & ADDITIONAL STUDIES:    < from: Xray Chest 2 Views PA/Lat (03.11.19 @ 12:54) >  EXAM:  XR CHEST PA LAT 2V      PROCEDURE DATE:  03/11/2019        INTERPRETATION:  PA and lateral chest radiographs     COMPARISON:  NONE.    CLINICAL INFORMATION: Chest Pain.    FINDINGS:    The airway is midline.  There are no airspace consolidations.  There is no pleural effusion or pneumothorax.   The heart size is within the limits of normal.   The visualized osseus structures are intact.     IMPRESSION:    No acute radiographic cardiopulmonary pathology.    < end of copied text >

## 2019-03-12 NOTE — DISCHARGE NOTE PROVIDER - NSDCCPCAREPLAN_GEN_ALL_CORE_FT
PRINCIPAL DISCHARGE DIAGNOSIS  Problem: Unstable angina pectoris  Assessment and Plan of Treatment: ruled out acs

## 2019-03-12 NOTE — PROGRESS NOTE ADULT - SUBJECTIVE AND OBJECTIVE BOX
Patient is a 68y old  Female who presents with a chief complaint of chest pain (11 Mar 2019 15:14)      Patient seen and examined at bedside.  Patient states having anxiety, denies any chest pain currently, no shortness of breath.      ALLERGIES:  No Known Allergies    MEDICATIONS:  aspirin enteric coated 81 milliGRAM(s) Oral daily  atorvastatin 40 milliGRAM(s) Oral at bedtime  clopidogrel Tablet 75 milliGRAM(s) Oral daily  enoxaparin Injectable 40 milliGRAM(s) SubCutaneous daily  famotidine  IVPB 20 milliGRAM(s) IV Intermittent daily  metoprolol succinate ER 25 milliGRAM(s) Oral daily  traMADol 50 milliGRAM(s) Oral every 8 hours PRN    Vital Signs Last 24 Hrs  T(F): 98 (12 Mar 2019 08:38), Max: 98.7 (11 Mar 2019 11:48)  HR: 78 (12 Mar 2019 08:38) (71 - 78)  BP: 136/72 (12 Mar 2019 08:38) (111/52 - 160/91)  RR: 15 (12 Mar 2019 08:38) (15 - 16)  SpO2: 97% (12 Mar 2019 08:38) (96% - 98%)  I&O's Summary    11 Mar 2019 07:01  -  12 Mar 2019 07:00  --------------------------------------------------------  IN: 120 mL / OUT: 2 mL / NET: 118 mL        PHYSICAL EXAM:  General: NAD, A/O x 3  ENT: MMM  Neck: Supple, No JVD  Lungs: Clear to auscultation bilaterally  Cardio: RRR, S1/S2, No murmurs  Abdomen: Soft, Nontender, Nondistended; Bowel sounds present  Extremities: No cyanosis, No edema    LABS:                        12.1   6.5   )-----------( 265      ( 11 Mar 2019 12:11 )             38.0     03-11    135  |  98  |  18  ----------------------------<  128  4.0   |  25  |  0.63    Ca    9.2      11 Mar 2019 12:11    TPro  8.0  /  Alb  3.3  /  TBili  0.4  /  DBili  x   /  AST  27  /  ALT  19  /  AlkPhos  60  03-11    eGFR if Non : 93 mL/min/1.73M2 (03-11-19 @ 12:11)  eGFR if : 108 mL/min/1.73M2 (03-11-19 @ 12:11)        CARDIAC MARKERS ( 11 Mar 2019 21:15 )  <.017 ng/mL / x     / x     / x     / x      CARDIAC MARKERS ( 11 Mar 2019 18:35 )  <.017 ng/mL / x     / x     / x     / x      CARDIAC MARKERS ( 11 Mar 2019 12:11 )  <.017 ng/mL / x     / x     / x     / x                  CAPILLARY BLOOD GLUCOSE                RADIOLOGY & ADDITIONAL TESTS:    Care Discussed with Consultants/Other Providers:

## 2019-03-12 NOTE — PROGRESS NOTE ADULT - ATTENDING COMMENTS
atypical chest pain  the biomarkers have returned negative the patient is pain free and a plain stress test is satisfactory. therefore, ms rivas may be discharged from a cardiac standpoint to close cardiac followup with her cardiologist dr figueredo. emphasized dual antiplatelet therapy for at least one year after a cardiac stent.

## 2019-03-12 NOTE — DISCHARGE NOTE PROVIDER - HOSPITAL COURSE
68 year old with a history of anxiety and CAD came for chest pain.  ACS has been ruled out - trops trended, TTE done, and was evaluated by cardiology.    Discussed hospitalization with Dr. Peralta, her primary care provider who agreed with the discharge.

## 2019-03-12 NOTE — DISCHARGE NOTE NURSING/CASE MANAGEMENT/SOCIAL WORK - NSDCDPATPORTLINK_GEN_ALL_CORE
You can access the [facility] Patient Portal, offered by [facility], by registering with the following website: [email or web address]

## 2019-03-12 NOTE — PROGRESS NOTE ADULT - ASSESSMENT
67 yo f with pmh CAD s/p pci 1-2019 presents with chest pain.    1- chest pain: ACS was ruled out, trops negative, EF 60-65%, cardiology has consulted and feel that the pain is muscle -skeletal in origin  2- CAD s/p PCI: c/w asa, plavix, statin and metoprolol  3- gerd: resume h2 blocker  4. dvt ppx: lovenox

## 2019-07-10 PROCEDURE — 85027 COMPLETE CBC AUTOMATED: CPT

## 2019-07-10 PROCEDURE — 99285 EMERGENCY DEPT VISIT HI MDM: CPT | Mod: 25

## 2019-07-10 PROCEDURE — 84484 ASSAY OF TROPONIN QUANT: CPT

## 2019-07-10 PROCEDURE — 86803 HEPATITIS C AB TEST: CPT

## 2019-07-10 PROCEDURE — 71046 X-RAY EXAM CHEST 2 VIEWS: CPT

## 2019-07-10 PROCEDURE — 36000 PLACE NEEDLE IN VEIN: CPT

## 2019-07-10 PROCEDURE — 93005 ELECTROCARDIOGRAM TRACING: CPT

## 2019-07-10 PROCEDURE — 80053 COMPREHEN METABOLIC PANEL: CPT

## 2019-07-10 PROCEDURE — 36415 COLL VENOUS BLD VENIPUNCTURE: CPT

## 2019-07-10 PROCEDURE — 93017 CV STRESS TEST TRACING ONLY: CPT

## 2019-07-10 PROCEDURE — 93306 TTE W/DOPPLER COMPLETE: CPT

## 2019-08-01 ENCOUNTER — APPOINTMENT (OUTPATIENT)
Dept: ULTRASOUND IMAGING | Facility: HOSPITAL | Age: 69
End: 2019-08-01

## 2019-08-01 ENCOUNTER — APPOINTMENT (OUTPATIENT)
Dept: MAMMOGRAPHY | Facility: HOSPITAL | Age: 69
End: 2019-08-01

## 2019-09-09 ENCOUNTER — APPOINTMENT (OUTPATIENT)
Dept: NEUROLOGY | Facility: CLINIC | Age: 69
End: 2019-09-09

## 2019-10-06 ENCOUNTER — TRANSCRIPTION ENCOUNTER (OUTPATIENT)
Age: 69
End: 2019-10-06

## 2019-10-07 ENCOUNTER — OUTPATIENT (OUTPATIENT)
Dept: OUTPATIENT SERVICES | Facility: HOSPITAL | Age: 69
LOS: 1 days | End: 2019-10-07
Payer: MEDICARE

## 2019-10-07 ENCOUNTER — RESULT REVIEW (OUTPATIENT)
Age: 69
End: 2019-10-07

## 2019-10-07 DIAGNOSIS — R10.13 EPIGASTRIC PAIN: ICD-10-CM

## 2019-10-07 DIAGNOSIS — Z98.891 HISTORY OF UTERINE SCAR FROM PREVIOUS SURGERY: Chronic | ICD-10-CM

## 2019-10-07 DIAGNOSIS — K59.00 CONSTIPATION, UNSPECIFIED: ICD-10-CM

## 2019-10-07 DIAGNOSIS — K22.70 BARRETT'S ESOPHAGUS WITHOUT DYSPLASIA: ICD-10-CM

## 2019-10-07 PROCEDURE — 88305 TISSUE EXAM BY PATHOLOGIST: CPT | Mod: 26

## 2019-10-07 PROCEDURE — 88312 SPECIAL STAINS GROUP 1: CPT

## 2019-10-07 PROCEDURE — 43239 EGD BIOPSY SINGLE/MULTIPLE: CPT

## 2019-10-07 PROCEDURE — 88312 SPECIAL STAINS GROUP 1: CPT | Mod: 26

## 2019-10-07 PROCEDURE — 88305 TISSUE EXAM BY PATHOLOGIST: CPT

## 2019-10-07 RX ORDER — SODIUM CHLORIDE 9 MG/ML
1000 INJECTION INTRAMUSCULAR; INTRAVENOUS; SUBCUTANEOUS
Refills: 0 | Status: DISCONTINUED | OUTPATIENT
Start: 2019-10-07 | End: 2019-10-22

## 2019-10-07 RX ADMIN — SODIUM CHLORIDE 75 MILLILITER(S): 9 INJECTION INTRAMUSCULAR; INTRAVENOUS; SUBCUTANEOUS at 12:02

## 2019-10-31 PROCEDURE — 93005 ELECTROCARDIOGRAM TRACING: CPT

## 2019-10-31 PROCEDURE — C1769: CPT

## 2019-10-31 PROCEDURE — C1887: CPT

## 2019-10-31 PROCEDURE — C9600: CPT | Mod: LD

## 2019-10-31 PROCEDURE — 80053 COMPREHEN METABOLIC PANEL: CPT

## 2019-10-31 PROCEDURE — 99152 MOD SED SAME PHYS/QHP 5/>YRS: CPT

## 2019-10-31 PROCEDURE — 80048 BASIC METABOLIC PNL TOTAL CA: CPT

## 2019-10-31 PROCEDURE — C1725: CPT

## 2019-10-31 PROCEDURE — 93458 L HRT ARTERY/VENTRICLE ANGIO: CPT | Mod: 59

## 2019-10-31 PROCEDURE — 99153 MOD SED SAME PHYS/QHP EA: CPT

## 2019-10-31 PROCEDURE — C1894: CPT

## 2019-10-31 PROCEDURE — C1874: CPT

## 2019-10-31 PROCEDURE — 85027 COMPLETE CBC AUTOMATED: CPT

## 2020-08-12 ENCOUNTER — OUTPATIENT (OUTPATIENT)
Dept: OUTPATIENT SERVICES | Facility: HOSPITAL | Age: 70
LOS: 1 days | End: 2020-08-12
Payer: MEDICARE

## 2020-08-12 ENCOUNTER — APPOINTMENT (OUTPATIENT)
Dept: MAMMOGRAPHY | Facility: HOSPITAL | Age: 70
End: 2020-08-12
Payer: MEDICARE

## 2020-08-12 ENCOUNTER — APPOINTMENT (OUTPATIENT)
Dept: ULTRASOUND IMAGING | Facility: HOSPITAL | Age: 70
End: 2020-08-12
Payer: MEDICARE

## 2020-08-12 DIAGNOSIS — N60.09 SOLITARY CYST OF UNSPECIFIED BREAST: ICD-10-CM

## 2020-08-12 DIAGNOSIS — Z98.891 HISTORY OF UTERINE SCAR FROM PREVIOUS SURGERY: Chronic | ICD-10-CM

## 2020-08-12 DIAGNOSIS — Z00.8 ENCOUNTER FOR OTHER GENERAL EXAMINATION: ICD-10-CM

## 2020-08-12 PROCEDURE — 77063 BREAST TOMOSYNTHESIS BI: CPT

## 2020-08-12 PROCEDURE — 77063 BREAST TOMOSYNTHESIS BI: CPT | Mod: 26

## 2020-08-12 PROCEDURE — 76641 ULTRASOUND BREAST COMPLETE: CPT | Mod: 26,50

## 2020-08-12 PROCEDURE — 77067 SCR MAMMO BI INCL CAD: CPT | Mod: 26

## 2020-08-12 PROCEDURE — 76641 ULTRASOUND BREAST COMPLETE: CPT

## 2020-08-12 PROCEDURE — 77067 SCR MAMMO BI INCL CAD: CPT

## 2020-08-17 ENCOUNTER — OUTPATIENT (OUTPATIENT)
Dept: OUTPATIENT SERVICES | Facility: HOSPITAL | Age: 70
LOS: 1 days | End: 2020-08-17
Payer: MEDICARE

## 2020-08-17 ENCOUNTER — APPOINTMENT (OUTPATIENT)
Dept: RADIOLOGY | Facility: HOSPITAL | Age: 70
End: 2020-08-17
Payer: MEDICARE

## 2020-08-17 DIAGNOSIS — Z00.8 ENCOUNTER FOR OTHER GENERAL EXAMINATION: ICD-10-CM

## 2020-08-17 DIAGNOSIS — Z98.891 HISTORY OF UTERINE SCAR FROM PREVIOUS SURGERY: Chronic | ICD-10-CM

## 2020-08-17 PROCEDURE — 73502 X-RAY EXAM HIP UNI 2-3 VIEWS: CPT | Mod: 26,LT

## 2020-08-17 PROCEDURE — 73502 X-RAY EXAM HIP UNI 2-3 VIEWS: CPT

## 2020-08-22 ENCOUNTER — APPOINTMENT (OUTPATIENT)
Dept: ULTRASOUND IMAGING | Facility: HOSPITAL | Age: 70
End: 2020-08-22

## 2020-09-03 ENCOUNTER — APPOINTMENT (OUTPATIENT)
Dept: CARDIOLOGY | Facility: CLINIC | Age: 70
End: 2020-09-03

## 2020-09-14 ENCOUNTER — APPOINTMENT (OUTPATIENT)
Dept: NEUROLOGY | Facility: CLINIC | Age: 70
End: 2020-09-14

## 2020-09-28 ENCOUNTER — APPOINTMENT (OUTPATIENT)
Dept: RHEUMATOLOGY | Facility: CLINIC | Age: 70
End: 2020-09-28

## 2020-12-02 ENCOUNTER — APPOINTMENT (OUTPATIENT)
Dept: CARDIOLOGY | Facility: CLINIC | Age: 70
End: 2020-12-02
Payer: MEDICARE

## 2020-12-02 ENCOUNTER — NON-APPOINTMENT (OUTPATIENT)
Age: 70
End: 2020-12-02

## 2020-12-02 VITALS
HEIGHT: 63 IN | SYSTOLIC BLOOD PRESSURE: 149 MMHG | RESPIRATION RATE: 16 BRPM | HEART RATE: 66 BPM | WEIGHT: 166 LBS | BODY MASS INDEX: 29.41 KG/M2 | DIASTOLIC BLOOD PRESSURE: 80 MMHG | TEMPERATURE: 98 F | OXYGEN SATURATION: 98 %

## 2020-12-02 VITALS — SYSTOLIC BLOOD PRESSURE: 140 MMHG | DIASTOLIC BLOOD PRESSURE: 80 MMHG

## 2020-12-02 VITALS — HEART RATE: 60 BPM

## 2020-12-02 DIAGNOSIS — Z86.39 PERSONAL HISTORY OF OTHER ENDOCRINE, NUTRITIONAL AND METABOLIC DISEASE: ICD-10-CM

## 2020-12-02 PROBLEM — Z00.00 ENCOUNTER FOR PREVENTIVE HEALTH EXAMINATION: Noted: 2020-12-02

## 2020-12-02 PROCEDURE — 93306 TTE W/DOPPLER COMPLETE: CPT

## 2020-12-02 PROCEDURE — 99214 OFFICE O/P EST MOD 30 MIN: CPT

## 2020-12-02 PROCEDURE — 93000 ELECTROCARDIOGRAM COMPLETE: CPT

## 2020-12-02 PROCEDURE — 99072 ADDL SUPL MATRL&STAF TM PHE: CPT

## 2020-12-02 RX ORDER — CLOPIDOGREL BISULFATE 75 MG/1
75 TABLET, FILM COATED ORAL
Qty: 30 | Refills: 0 | Status: ACTIVE | COMMUNITY
Start: 2020-12-02

## 2020-12-29 ENCOUNTER — APPOINTMENT (OUTPATIENT)
Dept: CARDIOLOGY | Facility: CLINIC | Age: 70
End: 2020-12-29
Payer: MEDICARE

## 2020-12-29 PROCEDURE — 93015 CV STRESS TEST SUPVJ I&R: CPT

## 2020-12-29 PROCEDURE — 99072 ADDL SUPL MATRL&STAF TM PHE: CPT

## 2020-12-29 PROCEDURE — 78452 HT MUSCLE IMAGE SPECT MULT: CPT

## 2020-12-29 PROCEDURE — A9500: CPT

## 2021-03-11 ENCOUNTER — NON-APPOINTMENT (OUTPATIENT)
Age: 71
End: 2021-03-11

## 2021-03-11 ENCOUNTER — APPOINTMENT (OUTPATIENT)
Dept: CARDIOLOGY | Facility: CLINIC | Age: 71
End: 2021-03-11
Payer: MEDICARE

## 2021-03-11 VITALS — DIASTOLIC BLOOD PRESSURE: 78 MMHG | HEART RATE: 64 BPM | SYSTOLIC BLOOD PRESSURE: 140 MMHG

## 2021-03-11 VITALS
WEIGHT: 172 LBS | DIASTOLIC BLOOD PRESSURE: 73 MMHG | RESPIRATION RATE: 16 BRPM | SYSTOLIC BLOOD PRESSURE: 117 MMHG | TEMPERATURE: 96.8 F | HEART RATE: 68 BPM | HEIGHT: 63 IN | BODY MASS INDEX: 30.48 KG/M2 | OXYGEN SATURATION: 97 %

## 2021-03-11 DIAGNOSIS — R94.31 ABNORMAL ELECTROCARDIOGRAM [ECG] [EKG]: ICD-10-CM

## 2021-03-11 PROCEDURE — 99214 OFFICE O/P EST MOD 30 MIN: CPT

## 2021-03-11 PROCEDURE — 99072 ADDL SUPL MATRL&STAF TM PHE: CPT

## 2021-03-11 PROCEDURE — 93000 ELECTROCARDIOGRAM COMPLETE: CPT

## 2021-03-11 NOTE — PHYSICAL EXAM
[General Appearance - Well Developed] : well developed [Normal Appearance] : normal appearance [Well Groomed] : well groomed [General Appearance - Well Nourished] : well nourished [No Deformities] : no deformities [General Appearance - In No Acute Distress] : no acute distress [Normal Oral Mucosa] : normal oral mucosa [No Oral Pallor] : no oral pallor [No Oral Cyanosis] : no oral cyanosis [Normal Jugular Venous A Waves Present] : normal jugular venous A waves present [Normal Jugular Venous V Waves Present] : normal jugular venous V waves present [No Jugular Venous Horan A Waves] : no jugular venous horan A waves [Respiration, Rhythm And Depth] : normal respiratory rhythm and effort [Exaggerated Use Of Accessory Muscles For Inspiration] : no accessory muscle use [Auscultation Breath Sounds / Voice Sounds] : lungs were clear to auscultation bilaterally [Abdomen Soft] : soft [Abdomen Tenderness] : non-tender [Abdomen Mass (___ Cm)] : no abdominal mass palpated [Abnormal Walk] : normal gait [Gait - Sufficient For Exercise Testing] : the gait was sufficient for exercise testing [Nail Clubbing] : no clubbing of the fingernails [Cyanosis, Localized] : no localized cyanosis [Petechial Hemorrhages (___cm)] : no petechial hemorrhages [Skin Color & Pigmentation] : normal skin color and pigmentation [] : no rash [No Venous Stasis] : no venous stasis [Skin Lesions] : no skin lesions [No Skin Ulcers] : no skin ulcer [No Xanthoma] : no  xanthoma was observed [Oriented To Time, Place, And Person] : oriented to person, place, and time [Affect] : the affect was normal [Mood] : the mood was normal [No Anxiety] : not feeling anxious [Normal Rate] : normal [Normal S1] : normal S1 [Normal S2] : normal S2 [S3] : no S3 [S4] : no S4 [No Murmur] : no murmurs heard [Right Carotid Bruit] : no bruit heard over the right carotid [Left Carotid Bruit] : no bruit heard over the left carotid [Right Femoral Bruit] : no bruit heard over the right femoral artery [Left Femoral Bruit] : no bruit heard over the left femoral artery [2+] : left 2+ [No Abnormalities] : the abdominal aorta was not enlarged and no bruit was heard [No Pitting Edema] : no pitting edema present

## 2021-03-11 NOTE — REASON FOR VISIT
[Follow-Up - Clinic] : a clinic follow-up of [FreeTextEntry2] : c [FreeTextEntry1] : Patient returns for reevaluation. Since she was last here she has undergone vasodilator myocardial perfusion imaging which was negative for ischemia. She states she feels well. He denies chest discomfort shortness of breath palpitations dizziness or syncope. She states her fatigue is gone.

## 2021-03-11 NOTE — ASSESSMENT
[FreeTextEntry1] : In summary, the patient is a 70-year-old woman with normal left ventricular systolic function demonstrated on a recent echocardiogram and no evidence of myocardial ischemia noted on vasodilator myocardial perfusion imaging.\par \par There is no cardiac contraindication to the proposed procedure

## 2021-03-13 PROBLEM — Z01.419 ENCOUNTER FOR ANNUAL ROUTINE GYNECOLOGICAL EXAMINATION: Status: RESOLVED | Noted: 2021-03-13 | Resolved: 2021-03-27

## 2021-03-17 ENCOUNTER — APPOINTMENT (OUTPATIENT)
Dept: OBGYN | Facility: CLINIC | Age: 71
End: 2021-03-17

## 2021-03-17 DIAGNOSIS — Z01.419 ENCOUNTER FOR GYNECOLOGICAL EXAMINATION (GENERAL) (ROUTINE) W/OUT ABNORMAL FINDINGS: ICD-10-CM

## 2021-03-19 ENCOUNTER — APPOINTMENT (OUTPATIENT)
Dept: NEUROLOGY | Facility: CLINIC | Age: 71
End: 2021-03-19

## 2021-06-02 ENCOUNTER — APPOINTMENT (OUTPATIENT)
Dept: VASCULAR SURGERY | Facility: CLINIC | Age: 71
End: 2021-06-02

## 2021-06-10 ENCOUNTER — APPOINTMENT (OUTPATIENT)
Dept: OBGYN | Facility: CLINIC | Age: 71
End: 2021-06-10

## 2021-06-16 ENCOUNTER — EMERGENCY (EMERGENCY)
Facility: HOSPITAL | Age: 71
LOS: 1 days | Discharge: HOME | End: 2021-06-16
Attending: EMERGENCY MEDICINE
Payer: COMMERCIAL

## 2021-06-16 VITALS
SYSTOLIC BLOOD PRESSURE: 110 MMHG | HEART RATE: 65 BPM | DIASTOLIC BLOOD PRESSURE: 69 MMHG | OXYGEN SATURATION: 98 % | RESPIRATION RATE: 19 BRPM | TEMPERATURE: 98 F

## 2021-06-16 VITALS
WEIGHT: 164.91 LBS | SYSTOLIC BLOOD PRESSURE: 131 MMHG | TEMPERATURE: 98 F | RESPIRATION RATE: 15 BRPM | DIASTOLIC BLOOD PRESSURE: 87 MMHG | HEIGHT: 62 IN | OXYGEN SATURATION: 98 % | HEART RATE: 75 BPM

## 2021-06-16 DIAGNOSIS — Z98.891 HISTORY OF UTERINE SCAR FROM PREVIOUS SURGERY: Chronic | ICD-10-CM

## 2021-06-16 LAB
ALBUMIN SERPL ELPH-MCNC: 4.2 G/DL — SIGNIFICANT CHANGE UP (ref 3.3–5)
ALP SERPL-CCNC: 69 U/L — SIGNIFICANT CHANGE UP (ref 40–120)
ALT FLD-CCNC: 13 U/L — SIGNIFICANT CHANGE UP (ref 10–45)
ANION GAP SERPL CALC-SCNC: 10 MMOL/L — SIGNIFICANT CHANGE UP (ref 5–17)
APTT BLD: 33.1 SEC — SIGNIFICANT CHANGE UP (ref 27.5–35.5)
AST SERPL-CCNC: 16 U/L — SIGNIFICANT CHANGE UP (ref 10–40)
BASE EXCESS BLDV CALC-SCNC: 3 MMOL/L — HIGH (ref -2–2)
BASOPHILS # BLD AUTO: 0.03 K/UL — SIGNIFICANT CHANGE UP (ref 0–0.2)
BASOPHILS NFR BLD AUTO: 0.4 % — SIGNIFICANT CHANGE UP (ref 0–2)
BILIRUB SERPL-MCNC: 0.3 MG/DL — SIGNIFICANT CHANGE UP (ref 0.2–1.2)
BUN SERPL-MCNC: 15 MG/DL — SIGNIFICANT CHANGE UP (ref 7–23)
CALCIUM SERPL-MCNC: 9.8 MG/DL — SIGNIFICANT CHANGE UP (ref 8.4–10.5)
CHLORIDE SERPL-SCNC: 99 MMOL/L — SIGNIFICANT CHANGE UP (ref 96–108)
CO2 BLDV-SCNC: 30 MMOL/L — SIGNIFICANT CHANGE UP (ref 22–30)
CO2 SERPL-SCNC: 25 MMOL/L — SIGNIFICANT CHANGE UP (ref 22–31)
CREAT SERPL-MCNC: 0.67 MG/DL — SIGNIFICANT CHANGE UP (ref 0.5–1.3)
EOSINOPHIL # BLD AUTO: 0.14 K/UL — SIGNIFICANT CHANGE UP (ref 0–0.5)
EOSINOPHIL NFR BLD AUTO: 1.7 % — SIGNIFICANT CHANGE UP (ref 0–6)
GAS PNL BLDV: SIGNIFICANT CHANGE UP
GLUCOSE SERPL-MCNC: 104 MG/DL — HIGH (ref 70–99)
HCO3 BLDV-SCNC: 28 MMOL/L — SIGNIFICANT CHANGE UP (ref 21–29)
HCT VFR BLD CALC: 39.8 % — SIGNIFICANT CHANGE UP (ref 34.5–45)
HGB BLD-MCNC: 13 G/DL — SIGNIFICANT CHANGE UP (ref 11.5–15.5)
IMM GRANULOCYTES NFR BLD AUTO: 0.2 % — SIGNIFICANT CHANGE UP (ref 0–1.5)
INR BLD: 1.02 RATIO — SIGNIFICANT CHANGE UP (ref 0.88–1.16)
LYMPHOCYTES # BLD AUTO: 1.67 K/UL — SIGNIFICANT CHANGE UP (ref 1–3.3)
LYMPHOCYTES # BLD AUTO: 20.6 % — SIGNIFICANT CHANGE UP (ref 13–44)
MCHC RBC-ENTMCNC: 29.7 PG — SIGNIFICANT CHANGE UP (ref 27–34)
MCHC RBC-ENTMCNC: 32.7 GM/DL — SIGNIFICANT CHANGE UP (ref 32–36)
MCV RBC AUTO: 90.9 FL — SIGNIFICANT CHANGE UP (ref 80–100)
MONOCYTES # BLD AUTO: 0.44 K/UL — SIGNIFICANT CHANGE UP (ref 0–0.9)
MONOCYTES NFR BLD AUTO: 5.4 % — SIGNIFICANT CHANGE UP (ref 2–14)
NEUTROPHILS # BLD AUTO: 5.81 K/UL — SIGNIFICANT CHANGE UP (ref 1.8–7.4)
NEUTROPHILS NFR BLD AUTO: 71.7 % — SIGNIFICANT CHANGE UP (ref 43–77)
NRBC # BLD: 0 /100 WBCS — SIGNIFICANT CHANGE UP (ref 0–0)
PCO2 BLDV: 50 MMHG — SIGNIFICANT CHANGE UP (ref 35–50)
PH BLDV: 7.38 — SIGNIFICANT CHANGE UP (ref 7.35–7.45)
PLATELET # BLD AUTO: 305 K/UL — SIGNIFICANT CHANGE UP (ref 150–400)
PO2 BLDV: 28 MMHG — SIGNIFICANT CHANGE UP (ref 25–45)
POTASSIUM SERPL-MCNC: 4.4 MMOL/L — SIGNIFICANT CHANGE UP (ref 3.5–5.3)
POTASSIUM SERPL-SCNC: 4.4 MMOL/L — SIGNIFICANT CHANGE UP (ref 3.5–5.3)
PROT SERPL-MCNC: 7.9 G/DL — SIGNIFICANT CHANGE UP (ref 6–8.3)
PROTHROM AB SERPL-ACNC: 12.2 SEC — SIGNIFICANT CHANGE UP (ref 10.6–13.6)
RBC # BLD: 4.38 M/UL — SIGNIFICANT CHANGE UP (ref 3.8–5.2)
RBC # FLD: 15.2 % — HIGH (ref 10.3–14.5)
SAO2 % BLDV: 45 % — LOW (ref 67–88)
SODIUM SERPL-SCNC: 134 MMOL/L — LOW (ref 135–145)
TROPONIN T, HIGH SENSITIVITY RESULT: 7 NG/L — SIGNIFICANT CHANGE UP (ref 0–51)
TROPONIN T, HIGH SENSITIVITY RESULT: 7 NG/L — SIGNIFICANT CHANGE UP (ref 0–51)
WBC # BLD: 8.11 K/UL — SIGNIFICANT CHANGE UP (ref 3.8–10.5)
WBC # FLD AUTO: 8.11 K/UL — SIGNIFICANT CHANGE UP (ref 3.8–10.5)

## 2021-06-16 PROCEDURE — 93010 ELECTROCARDIOGRAM REPORT: CPT

## 2021-06-16 PROCEDURE — 70496 CT ANGIOGRAPHY HEAD: CPT | Mod: 26,MA

## 2021-06-16 PROCEDURE — 82803 BLOOD GASES ANY COMBINATION: CPT

## 2021-06-16 PROCEDURE — 85610 PROTHROMBIN TIME: CPT

## 2021-06-16 PROCEDURE — 0042T: CPT

## 2021-06-16 PROCEDURE — 99284 EMERGENCY DEPT VISIT MOD MDM: CPT

## 2021-06-16 PROCEDURE — 80053 COMPREHEN METABOLIC PANEL: CPT

## 2021-06-16 PROCEDURE — 70498 CT ANGIOGRAPHY NECK: CPT

## 2021-06-16 PROCEDURE — 70496 CT ANGIOGRAPHY HEAD: CPT

## 2021-06-16 PROCEDURE — 85025 COMPLETE CBC W/AUTO DIFF WBC: CPT

## 2021-06-16 PROCEDURE — 85730 THROMBOPLASTIN TIME PARTIAL: CPT

## 2021-06-16 PROCEDURE — 99285 EMERGENCY DEPT VISIT HI MDM: CPT | Mod: 25

## 2021-06-16 PROCEDURE — 82962 GLUCOSE BLOOD TEST: CPT

## 2021-06-16 PROCEDURE — 84484 ASSAY OF TROPONIN QUANT: CPT

## 2021-06-16 PROCEDURE — 70498 CT ANGIOGRAPHY NECK: CPT | Mod: 26,MA

## 2021-06-16 PROCEDURE — 93005 ELECTROCARDIOGRAM TRACING: CPT

## 2021-06-16 PROCEDURE — 70450 CT HEAD/BRAIN W/O DYE: CPT

## 2021-06-16 RX ORDER — METOPROLOL TARTRATE 50 MG
1 TABLET ORAL
Qty: 0 | Refills: 0 | DISCHARGE

## 2021-06-16 RX ORDER — RANITIDINE HYDROCHLORIDE 150 MG/1
1 TABLET, FILM COATED ORAL
Qty: 0 | Refills: 0 | DISCHARGE

## 2021-06-16 NOTE — STROKE CODE NOTE - MRS SCORE
(0) No symptoms (2) Slight disablitiy.  Able to look after own affairs without assistance, but unable to carry out all previous activities.

## 2021-06-16 NOTE — ED ADULT NURSE REASSESSMENT NOTE - NS ED NURSE REASSESS COMMENT FT1
Ok for safe discharge at this time. D/c paper work gone over with patient and family by MD. Pt. verbalized understanding.

## 2021-06-16 NOTE — ED PROVIDER NOTE - NS ED ROS FT
Review of Systems:  · Constitutional: no chills, no fever, no night sweats, no weight loss  · Nose: no epistaxis  · Mouth/Throat: Difficulty "Sucking from mouth", no difficulty in swallowing, trachea midline, uvula midline  . Cardio: chest pressure, No CP, no palpitations, no chest pressure, no ripping chest pain  · Respiratory: no cough, no exertional dyspnea, no hemoptysis, no orthopnea, no shortness of breath  · Gastrointestinal: no abdominal pain, no diarrhea, no melena, no nausea, no vomiting  · Genitourinary: no difficulty urinating, no dysuria, no hematuria  · MUSCULOSKELETAL: FROM of all extremities  · Skin: no abrasion; no bruising; no laceration  · Neurological: no change in level of consciousness, no headache, no seizures  · Psychiatric: no anxiety, no depression  · ROS STATEMENT: all other ROS negative except as per HPI

## 2021-06-16 NOTE — CONSULT NOTE ADULT - SUBJECTIVE AND OBJECTIVE BOX
HPI:    (Stroke only)  NIHSS:   MRS:   ICH:     REVIEW OF SYSTEMS  General:	  Skin/Breast:	  Ophthalmologic:  ENMT:	  Respiratory and Thorax:	  Cardiovascular:	  Gastrointestinal:	  Genitourinary:	  Musculoskeletal:	  Neurological:	  Psychiatric:	  Hematology/Lymphatics:	  Endocrine:	  Allergic/Immunologic:	    A 10-system ROS was performed and is negative except for those items noted above and/or in the HPI.    PAST MEDICAL & SURGICAL HISTORY:  Depression    Stroke      Hypertension    Rotator cuff tear    Acid reflux    H/O  section        FAMILY HISTORY:  No pertinent family history in first degree relatives      SOCIAL HISTORY:   T/E/D:   Occupation:   Lives with:     MEDICATIONS (HOME):  Home Medications:  aspirin 81 mg oral delayed release tablet: 1 tab(s) orally once a day (2019 20:45)  citalopram 20 mg oral tablet: 1 tab(s) orally once a day (2019 14:30)  Lipitor 40 mg oral tablet: 1 tab(s) orally once a day (11 Mar 2019 14:14)  Metoprolol Succinate ER 25 mg oral tablet, extended release: 1 tab(s) orally once a day (2019 14:30)  raNITIdine 150 mg oral capsule: 1 cap(s) orally 2 times a day (2019 11:50)    MEDICATIONS  (STANDING):    MEDICATIONS  (PRN):    ALLERGIES/INTOLERANCES:  Allergies  No Known Allergies    Intolerances    VITALS & EXAMINATION:  Vital Signs Last 24 Hrs  T(C): 36.8 (2021 13:06), Max: 36.8 (2021 13:06)  T(F): 98.2 (2021 13:06), Max: 98.2 (2021 13:06)  HR: 75 (2021 13:06) (75 - 75)  BP: 131/87 (2021 13:06) (131/87 - 131/87)  BP(mean): --  RR: 15 (2021 13:06) (15 - 15)  SpO2: 98% (2021 13:06) (98% - 98%)    Neurological Exam            LABORATORY:  CBC                       13.0   8.11  )-----------( 305      ( 2021 13:19 )             39.8     Chem       LFTs   Coagulopathy PT/INR - ( 2021 13:19 )   PT: 12.2 sec;   INR: 1.02 ratio         PTT - ( 2021 13:19 )  PTT:33.1 sec  Lipid Panel   A1c   Cardiac enzymes     U/A   CSF  Immunological  Other    STUDIES & IMAGING:        < from: CT Angio Neck w/ IV Cont (21 @ 13:36) >    IMPRESSION: Right basal ganglia small vessel white matter ischemic changes. . Normal CT perfusion. Small nondominant left vertebral artery ends at the PICA.    < end of copied text >   HPI:    71 year old female with pmhx HTN, depression, CVA 2012,  CAD, s/p pci done  presents today from PCP who sent patient to the ED secondary to 1 day of increased dysphasia, worse since night PTA.  Code Stroke activated. Patient c/o chest pressure and mouth numbness, now resolved.  She reports that her dysarthria is stable since prior stroke. She denies: shortness of Breath, abdominal pain, Nausea/Vomiting/Diarrhea, dizziness, weakness, confusion, vision changes, urinary symptoms, syncope, falls, trauma, discharge, bleeding, fevers.     (Stroke only)  NIHSS: 4  MRS: 2  LKN: unknown    REVIEW OF SYSTEMS  General:	  Skin/Breast:	  Ophthalmologic:  ENMT:	  Respiratory and Thorax:	  Cardiovascular:	  Gastrointestinal:	  Genitourinary:	  Musculoskeletal:	  Neurological:	  Psychiatric:	  Hematology/Lymphatics:	  Endocrine:	  Allergic/Immunologic:	    A 10-system ROS was performed and is negative except for those items noted above and/or in the HPI.    PAST MEDICAL & SURGICAL HISTORY:  Depression    Stroke      Hypertension    Rotator cuff tear    Acid reflux    H/O  section        FAMILY HISTORY:  No pertinent family history in first degree relatives      SOCIAL HISTORY:   T/E/D:   Occupation:   Lives with:     MEDICATIONS (HOME):  Home Medications:  aspirin 81 mg oral delayed release tablet: 1 tab(s) orally once a day (2019 20:45)  citalopram 20 mg oral tablet: 1 tab(s) orally once a day (2019 14:30)  Lipitor 40 mg oral tablet: 1 tab(s) orally once a day (11 Mar 2019 14:14)  Metoprolol Succinate ER 25 mg oral tablet, extended release: 1 tab(s) orally once a day (2019 14:30)  raNITIdine 150 mg oral capsule: 1 cap(s) orally 2 times a day (2019 11:50)    MEDICATIONS  (STANDING):    MEDICATIONS  (PRN):    ALLERGIES/INTOLERANCES:  Allergies  No Known Allergies    Intolerances    VITALS & EXAMINATION:  Vital Signs Last 24 Hrs  T(C): 36.8 (2021 13:06), Max: 36.8 (2021 13:06)  T(F): 98.2 (2021 13:06), Max: 98.2 (2021 13:06)  HR: 75 (2021 13:06) (75 - 75)  BP: 131/87 (2021 13:06) (131/87 - 131/87)  BP(mean): --  RR: 15 (2021 13:06) (15 - 15)  SpO2: 98% (2021 13:06) (98% - 98%)    Neurological Exam    AOx3, mild aphasia, moderate dysarthria  EOMI, VFF, v1-3 intact, no droop  Motor: drift, very mild, RUE and RLE  Sensation: intact to LT x 4  Coordination: FNF and HTS no ataxia  No extinction or inattention          LABORATORY:  CBC                       13.0   8.11  )-----------( 305      ( 2021 13:19 )             39.8     Chem       LFTs   Coagulopathy PT/INR - ( 2021 13:19 )   PT: 12.2 sec;   INR: 1.02 ratio         PTT - ( 2021 13:19 )  PTT:33.1 sec  Lipid Panel   A1c   Cardiac enzymes     U/A   CSF  Immunological  Other    STUDIES & IMAGING:        < from: CT Angio Neck w/ IV Cont (21 @ 13:36) >    IMPRESSION: Right basal ganglia small vessel white matter ischemic changes. . Normal CT perfusion. Small nondominant left vertebral artery ends at the PICA.    < end of copied text >

## 2021-06-16 NOTE — ED PROVIDER NOTE - NSPTACCESSSVCSAPPTDETAILS_ED_ALL_ED_FT
please help patient make appt with Agata Bello  Neurology  [address] [facility], [address]  [place] [address]  Phone: [phone]

## 2021-06-16 NOTE — ED PROVIDER NOTE - ATTENDING CONTRIBUTION TO CARE
Patient with baseline neuro deficits from prior stroke sent from PMDs office for new dribbling from mouth when trying to drink along with tingling of the lips.  Also noted pressure, not pain in chest.  No new weakness, sensation loss or difficulty speaking, does have baseline dysarthria which is unchanged.    Exam:  General: Patient well appearing, vital signs within normal limits  HEENT: airway patent with moist mucous membranes  Cardiac: RRR S1/S2 with strong peripheral pulses  Respiratory: lungs clear without respiratory distress  GI: abdomen soft, non tender, non distended  Neuro: dysarthric but able to communicated, no obvious facial droop, strength slight RLE weakness  Skin: warm, well perfused  Psych: normal mood and affect      Code stroke activated by EMS, imaging obtained per protocol, with chest pressure already obtaining trop/EKG as a part of stroke workup but will also r/o ACS, dispo TBD.

## 2021-06-16 NOTE — ED ADULT NURSE NOTE - OBJECTIVE STATEMENT
71 year old female A&Ox1 (oriented to self), with PMH of HTN, CVA 2012 (dysarthria),  CAD (on Plavix), presents to ED via EMS from PCP c/o dysaphia since 1800 yesterday. At baseline, pt. states she has dysarthria from stroke in 2012. Speech difficult to understand due to history. Pt. reports yesterday she was attempting to drink coffee and it was dribbling from the side of her mouth. States today she noticed numbness to bilateral cheeks. Went to PMD today and was advised to come to the ED for further evaluation. Code stroke initiated at 1311. FS - 94. Pos. strength noted to all extremities and strong peripheral pulses. PERRL - 3 mm bilaterally. Pt. brought immediately to CT. EKG done. Pt. placed on CM. Safety and comfort provided. Neuro checks being completed and placed in pt. paper chart.

## 2021-06-16 NOTE — ED PROVIDER NOTE - NSFOLLOWUPCLINICS_GEN_ALL_ED_FT
[facility]  Neurology  [address] [place] [address] - [address] [place] [address]  Phone: [phone]  Fax:   Follow Up Time: 4-6 Days

## 2021-06-16 NOTE — CONSULT NOTE ADULT - ASSESSMENT
71 year old female with pmhx HTN, depression, CVA 2012,  CAD, s/p pci done 1-2019 presents today from PCP who sent patient to the ED secondary to 1 day of increased dysphasia, worse since night PTA.  Code Stroke activated. Patient c/o chest pressure and mouth numbness, now resolved.  She reports that her dysarthria is stable since prior stroke. Imaging negative for acute changes.  Exam notable for dysarthria and mild aphasia and mild RUE and RLE drift.  Impression is patient is at her baseline, possibly increased sxs now resolved related to infection or other stressor.  NIHSS: 4  MRS: 2  LKN: unknown    Recommendation:  - aspirin 81 daily  - follow up with   Agata eBllo  Neurology  [address] [place] [address], [address]  [place] [address]  Phone: [phone]  Fax: [phone]  Follow Up Time: within 1 week

## 2021-06-16 NOTE — ED PROVIDER NOTE - OBJECTIVE STATEMENT
71 year old female with pmhx HTN, depression, CVA,  CAD, s/p pci done 1-2019 presents today 71 year old female with pmhx HTN, depression, CVA 2012,  CAD, s/p pci done 1-2019 presents today from PCP who sent patient to the ED secondary to 1 day of dysphasia started 6 pm yesterday.  Code Stroke activated. patient reports starting today she noticed she was attempting to "sip on coffee" when she had dribbling from the mouth which was new. patient reports baseline dysarthria since 2012 CVA. patient reports today she started to experience numbness to the mouth. patient also endorses chest pressure. patient denies chest pain, Shortness of Breath, abdominal pain, Nausea/Vomiting/Diarrhea, dizziness, weakness, confusion, vision changes, urinary symptoms, syncope, falls, trauma, discharge, bleeding, fevers. patient reports 1 year of urinary incontinence that is baseline and has been evaluated.

## 2021-06-16 NOTE — ED ADULT NURSE NOTE - NSIMPLEMENTINTERV_GEN_ALL_ED
Implemented All Fall with Harm Risk Interventions:  [place] to call system. Call bell, personal items and telephone within reach. Instruct patient to call for assistance. Room bathroom lighting operational. Non-slip footwear when patient is off stretcher. Physically safe environment: no spills, clutter or unnecessary equipment. Stretcher in lowest position, wheels locked, appropriate side rails in place. Provide visual cue, wrist band, yellow gown, etc. Monitor gait and stability. Monitor for mental status changes and reorient to person, place, and time. Review medications for side effects contributing to fall risk. Reinforce activity limits and safety measures with patient and family. Provide visual clues: red socks.

## 2021-06-16 NOTE — ED PROVIDER NOTE - NSFOLLOWUPINSTRUCTIONS_ED_ALL_ED_FT
You were evaluated in the Emergency Department for [INSERT CC HERE].  You were evaluated and examined by a physician, and you had [LIST: LABS, XRAYS, CT, US, ETC].      Based on your evaluation:___________     There are no signs of emergency conditions requiring admission to the hospital on today's workup.  Based on the evaluation, a presumptive diagnosis was made, however, further evaluation may be required by your primary care physician or a specialist for a more definitive diagnosis.  Therefore, please follow-up as directed or return to the Emergency Department if your symptoms change or worsen.    We recommend that you:  1. See your primary care physician within the next 72 hours for follow up.  Bring a copy of your discharge paperwork (including any test results) to your doctor.  2. Please take 1x Aspirin 81mg tablet daily  3. Please follow up with   Agata Bello  Neurology  [address], [address]  [place] [address]  Phone: [phone]  Fax: [phone]        *** Return immediately if you have worsening symptoms, chest pain, Shortness of Breath, abdominal pain, Nausea/Vomiting/Diarrhea, dizziness, weakness, confusion, vision changes, urinary symptoms, syncope, falls, trauma, discharge, bleeding, fevers , or any other new/concerning symptoms. *** There are no signs of emergency conditions requiring admission to the hospital on today's workup.  Based on the evaluation, a presumptive diagnosis was made, however, further evaluation may be required by your primary care physician or a specialist for a more definitive diagnosis.  Therefore, please follow-up as directed or return to the Emergency Department if your symptoms change or worsen.    We recommend that you:  1. See your primary care physician within the next 72 hours for follow up.  Bring a copy of your discharge paperwork (including any test results) to your doctor.  2. Please take 1x Aspirin 81mg tablet daily.  3. Please follow up with   Agata Bello  Neurology  [address], [address]  [place] [address]  Phone: [phone]  Fax: [phone]        *** Return immediately if you have worsening symptoms, numbness, Headache, tingling, paralysis, blurry vision, chest pain, Shortness of Breath, abdominal pain, Nausea/Vomiting/Diarrhea, dizziness, weakness, confusion, vision changes, urinary symptoms, syncope, falls, trauma, discharge, bleeding, fevers , or any other new/concerning symptoms. *** There are no signs of emergency conditions requiring admission to the hospital on today's workup.  Based on the evaluation, a presumptive diagnosis was made, however, further evaluation may be required by your primary care physician or a specialist for a more definitive diagnosis.  Therefore, please follow-up as directed or return to the Emergency Department if your symptoms change or worsen.    We recommend that you:  1. See your primary care physician within the next 72 hours for follow up.  Bring a copy of your discharge paperwork (including any test results) to your doctor.  2. Please take 1x Aspirin 81mg tablet daily.  3. Please follow up within a week with  Agata Bello  Neurology  [address], [address]  [place] [address]  Phone: [phone]  Fax: [phone]        *** Return immediately if you have worsening symptoms, numbness, Headache, tingling, paralysis, blurry vision, chest pain, Shortness of Breath, abdominal pain, Nausea/Vomiting/Diarrhea, dizziness, weakness, confusion, vision changes, urinary symptoms, syncope, falls, trauma, discharge, bleeding, fevers , or any other new/concerning symptoms. ***

## 2021-06-16 NOTE — ED PROVIDER NOTE - PATIENT PORTAL LINK FT
You can access the FollowMyHealth Patient Portal offered by [facility] by registering at the following website: [email or web address]. By joining [employer]’s FollowMyHealth portal, you will also be able to view your health information using other applications (apps) compatible with our system.

## 2021-06-23 ENCOUNTER — APPOINTMENT (OUTPATIENT)
Dept: RADIOLOGY | Facility: HOSPITAL | Age: 71
End: 2021-06-23
Payer: MEDICARE

## 2021-06-23 ENCOUNTER — OUTPATIENT (OUTPATIENT)
Dept: OUTPATIENT SERVICES | Facility: HOSPITAL | Age: 71
LOS: 1 days | End: 2021-06-23
Payer: MEDICARE

## 2021-06-23 DIAGNOSIS — Z98.891 HISTORY OF UTERINE SCAR FROM PREVIOUS SURGERY: Chronic | ICD-10-CM

## 2021-06-23 DIAGNOSIS — Z00.8 ENCOUNTER FOR OTHER GENERAL EXAMINATION: ICD-10-CM

## 2021-06-23 PROCEDURE — 72100 X-RAY EXAM L-S SPINE 2/3 VWS: CPT | Mod: 26

## 2021-06-23 PROCEDURE — 73522 X-RAY EXAM HIPS BI 3-4 VIEWS: CPT | Mod: 26

## 2021-06-23 PROCEDURE — 73522 X-RAY EXAM HIPS BI 3-4 VIEWS: CPT

## 2021-06-23 PROCEDURE — 72100 X-RAY EXAM L-S SPINE 2/3 VWS: CPT

## 2021-06-29 ENCOUNTER — APPOINTMENT (OUTPATIENT)
Dept: ULTRASOUND IMAGING | Facility: HOSPITAL | Age: 71
End: 2021-06-29

## 2021-07-20 ENCOUNTER — APPOINTMENT (OUTPATIENT)
Dept: NEUROLOGY | Facility: CLINIC | Age: 71
End: 2021-07-20

## 2021-07-26 ENCOUNTER — APPOINTMENT (OUTPATIENT)
Dept: NEUROLOGY | Facility: CLINIC | Age: 71
End: 2021-07-26

## 2021-07-31 NOTE — ED PROVIDER NOTE - CHIEF COMPLAINT
Patient Mya Layne is a 25 y.o. female    Chief Complaint  Suicidal      HPI  (History provided by patient)  This is a 25 y.o. female who was brought in by police for chief complaint of possible suicidal ideation. Patient reports that she was having a fight with her girlfriend today, girlfriend breaking up with her, she is feeling depressed, stated that she did not want to be here anymore and girlfriend called police. Patient denies any suicidal ideation or plan. States she has been feeling down due to multiple stressors such as housing problems, fights with girlfriend. Spoke with her girlfriend who aid multiple statements regarding patient telling her that she was going to kill her self and then run into a tree. Girlfriend then retracted the statements and asked specifically if patient had said them verbatim, stated that patient had made vague comments about not wanting to be here anymore and driving away. Girlfriends mother states patient is a danger to herself. Patient denies homicidal ideation, auditory or visual hallucinations. REVIEW OF SYSTEMS    Constitutional:  Denies fever, chills  HENT:  Denies sore throat or ear pain   Eyes: Denies vision changes, eye pain  Cardiovascular:  Denies chest pain, syncope  Respiratory:  Denies shortness of breath, cough   GI:  Denies abdominal pain, nausea, vomiting  :  Denies dysuria, discharge  Musculoskeletal:  Denies back pain, joint pain  Skin:  Denies rash, pruritis  Neurologic:  Denies headache, focal weakness, or sensory changes     See HPI and nursing notes for additional information     I have reviewed the following nursing documentation:  Allergies: No Known Allergies    Past medical history:  has no past medical history on file. Past surgical history:  has no past surgical history on file. Home medications:   Prior to Admission medications    Medication Sig Start Date End Date Taking?  Authorizing Provider   cephALEXin The patient is a 68y Female complaining of chest pain. (KEFLEX) 500 MG capsule Take 1 capsule by mouth 4 times daily for 10 days 7/23/21 8/2/21  Nilesh Diaz MD   sulfamethoxazole-trimethoprim (BACTRIM DS) 800-160 MG per tablet Take 1 tablet by mouth 2 times daily for 10 days 7/23/21 8/2/21  Nilesh Diaz MD       Social history:  reports that she has never smoked. She has never used smokeless tobacco. She reports that she does not drink alcohol and does not use drugs. Family history:  History reviewed. No pertinent family history. Exam  BP (!) 144/110   Pulse 90   Temp 99.4 °F (37.4 °C) (Oral)   Resp 20   Ht 5' 7\" (1.702 m)   Wt 200 lb (90.7 kg)   SpO2 100%   BMI 31.32 kg/m²   Nursing note and vitals reviewed. Constitutional: Well developed, well nourished. No acute distress. HENT:      Head: Normocephalic and atraumatic. Ears: External ears normal.      Nose: Nose normal.     Mouth: Membrane mucosa moist and pink. No posterior oropharynx erythema or tonsillar edema  Eyes: Anicteric sclera. No discharge, PERRL  Neck: Supple. Trachea midline. Cardiovascular: RRR, no murmurs, rubs, or gallops, radial pulses 2+ bilaterally. Pulmonary/Chest: Effort normal. No respiratory distress. CTAB. No stridor. No wheezes. No rales. Abdominal: Soft. Nontender to palpation. No distension. No guarding, rebound tenderness, or evidence of ascites. : No CVA tenderness. Musculoskeletal: Moves all extremities. No gross deformity. Neurological: Alert and oriented to person, place, and time. Normal muscle tone. Skin: Warm and dry. No rash. Psychiatric: Normal mood and affect.  Behavior is normal.      Radiographs (if obtained):  [] The following radiograph was interpreted by myself in the absence of a radiologist:   [x] Radiologist's Report Reviewed:  No orders to display          Labs  Results for orders placed or performed during the hospital encounter of 07/31/21   CBC Auto Differential   Result Value Ref Range    WBC 7.2 4.0 - 10.5 K/CU MM    RBC 5.34 4.2 - 5.4 M/CU MM    Hemoglobin 15.0 12.5 - 16.0 GM/DL    Hematocrit 47.5 (H) 37 - 47 %    MCV 89.0 78 - 100 FL    MCH 28.1 27 - 31 PG    MCHC 31.6 (L) 32.0 - 36.0 %    RDW 14.1 11.7 - 14.9 %    Platelets 240 651 - 835 K/CU MM    MPV 9.1 7.5 - 11.1 FL    Differential Type AUTOMATED DIFFERENTIAL     Segs Relative 79.3 (H) 34 - 64 %    Lymphocytes % 11.3 (L) 25 - 45 %    Monocytes % 6.7 (H) 0 - 4 %    Eosinophils % 2.1 0 - 3 %    Basophils % 0.3 0 - 1 %    Segs Absolute 5.7 K/CU MM    Lymphocytes Absolute 0.8 K/CU MM    Monocytes Absolute 0.5 K/CU MM    Eosinophils Absolute 0.2 K/CU MM    Basophils Absolute 0.0 K/CU MM    Nucleated RBC % 0.0 %    Total Nucleated RBC 0.0 K/CU MM    Total Immature Neutrophil 0.02 K/CU MM    Immature Neutrophil % 0.3 0 - 0.43 %   Comprehensive Metabolic Panel w/ Reflex to MG   Result Value Ref Range    Sodium 135 135 - 145 MMOL/L    Potassium 4.0 3.5 - 5.1 MMOL/L    Chloride 99 99 - 110 mMol/L    CO2 23 21 - 32 MMOL/L    BUN 6 6 - 23 MG/DL    CREATININE 1.0 0.6 - 1.1 MG/DL    Glucose 89 70 - 99 MG/DL    Calcium 9.6 8.3 - 10.6 MG/DL    Albumin 5.3 (H) 3.4 - 5.0 GM/DL    Total Protein 8.6 (H) 6.4 - 8.2 GM/DL    Total Bilirubin 0.6 0.0 - 1.0 MG/DL    ALT 20 10 - 40 U/L    AST 20 15 - 37 IU/L    Alkaline Phosphatase 117 40 - 129 IU/L    GFR Non-African American >60 >60 mL/min/1.73m2    GFR African American >60 >60 mL/min/1.73m2    Anion Gap 13 4 - 16   HCG Qualitative, Serum   Result Value Ref Range    hCG Qual NEGATIVE    Ethanol   Result Value Ref Range    Alcohol Scrn <0.01 <0.01 %WT/VOL   Urine Drug Screen   Result Value Ref Range    Cannabinoid Scrn, Ur UNCONFIRMED POSITIVE (A) NEGATIVE    Amphetamines NEGATIVE NEGATIVE    Cocaine Metabolite NEGATIVE NEGATIVE    Benzodiazepine Screen, Urine NEGATIVE NEGATIVE    Barbiturate Screen, Ur NEGATIVE NEGATIVE    Opiates, Urine NEGATIVE NEGATIVE    Phencyclidine, Urine NEGATIVE NEGATIVE    Oxycodone NEGATIVE NEGATIVE

## 2021-08-25 ENCOUNTER — APPOINTMENT (OUTPATIENT)
Dept: RHEUMATOLOGY | Facility: CLINIC | Age: 71
End: 2021-08-25

## 2021-09-23 ENCOUNTER — APPOINTMENT (OUTPATIENT)
Dept: OBGYN | Facility: CLINIC | Age: 71
End: 2021-09-23
Payer: MEDICARE

## 2021-09-23 VITALS
BODY MASS INDEX: 29.59 KG/M2 | OXYGEN SATURATION: 98 % | DIASTOLIC BLOOD PRESSURE: 72 MMHG | HEART RATE: 69 BPM | WEIGHT: 167 LBS | HEIGHT: 63 IN | SYSTOLIC BLOOD PRESSURE: 122 MMHG | TEMPERATURE: 97.3 F

## 2021-09-23 PROCEDURE — 99397 PER PM REEVAL EST PAT 65+ YR: CPT

## 2021-09-23 RX ORDER — CITALOPRAM HYDROBROMIDE 10 MG/1
10 TABLET, FILM COATED ORAL DAILY
Refills: 0 | Status: DISCONTINUED | COMMUNITY
End: 2021-09-23

## 2021-09-23 RX ORDER — CARBOXYMETHYLCELLULOSE SODIUM, GLYCERIN, AND POLYSORBATE 80 5; 10; 5 MG/ML; MG/ML; MG/ML
0.5-1-0.5 SOLUTION/ DROPS OPHTHALMIC TWICE DAILY
Qty: 1 | Refills: 3 | Status: DISCONTINUED | COMMUNITY
Start: 2017-05-31 | End: 2021-09-23

## 2021-09-23 RX ORDER — SULFASALAZINE 500 MG/1
500 TABLET ORAL
Refills: 0 | Status: DISCONTINUED | COMMUNITY
Start: 2020-12-02 | End: 2021-09-23

## 2021-09-23 RX ORDER — LANSOPRAZOLE 30 MG/1
30 CAPSULE, DELAYED RELEASE ORAL
Qty: 30 | Refills: 0 | Status: DISCONTINUED | COMMUNITY
Start: 2017-01-18 | End: 2021-09-23

## 2021-09-23 RX ORDER — VALACYCLOVIR 1 G/1
1 TABLET, FILM COATED ORAL
Qty: 4 | Refills: 1 | Status: DISCONTINUED | COMMUNITY
Start: 2017-08-08 | End: 2021-09-23

## 2021-09-23 RX ORDER — ALENDRONATE SODIUM 70 MG/1
70 TABLET ORAL
Refills: 0 | Status: DISCONTINUED | COMMUNITY
Start: 2020-12-02 | End: 2021-09-23

## 2021-09-23 NOTE — PLAN
[FreeTextEntry1] : I spent the time noted on the day of this patient encounter preparing for, providing and documenting the above E/M service and counseling and educate patient on differential, workup, disease course, and treatment/management. Education was provided to the patient during this encounter. All questions and concerns were answered and addressed in detail.\par \par Leora Beatty MD\par

## 2021-09-23 NOTE — HISTORY OF PRESENT ILLNESS
[Patient reported mammogram was normal] : Patient reported mammogram was normal [Patient reported PAP Smear was normal] : Patient reported PAP Smear was normal [FreeTextEntry1] : 70 yo P6  with LMP in her 50s presents today for well woman exam. Patient has suffered tardive dyskinesia.\par \par \par POB:SVDx5, CSx1\par PGYN: , hx of syphilis (chart review) and JULIAN 1\par PMH: depression, htn\par PSH: cs\par Med: gabapentin, statin, atenolol\par All: PCN\par SH: denies DAI\par  [Mammogramdate] : 2017 [PapSmeardate] : 2016

## 2021-09-27 DIAGNOSIS — B96.89 ACUTE VAGINITIS: ICD-10-CM

## 2021-09-27 DIAGNOSIS — N76.0 ACUTE VAGINITIS: ICD-10-CM

## 2021-09-27 LAB
C TRACH RRNA SPEC QL NAA+PROBE: NOT DETECTED
CANDIDA VAG CYTO: NOT DETECTED
G VAGINALIS+PREV SP MTYP VAG QL MICRO: DETECTED
HPV HIGH+LOW RISK DNA PNL CVX: NOT DETECTED
N GONORRHOEA RRNA SPEC QL NAA+PROBE: NOT DETECTED
SOURCE AMPLIFICATION: NORMAL
T VAGINALIS VAG QL WET PREP: NOT DETECTED

## 2021-09-29 LAB — CYTOLOGY CVX/VAG DOC THIN PREP: ABNORMAL

## 2021-10-01 ENCOUNTER — APPOINTMENT (OUTPATIENT)
Dept: RHEUMATOLOGY | Facility: CLINIC | Age: 71
End: 2021-10-01

## 2021-10-11 ENCOUNTER — APPOINTMENT (OUTPATIENT)
Dept: PHYSICAL MEDICINE AND REHAB | Facility: CLINIC | Age: 71
End: 2021-10-11
Payer: MEDICARE

## 2021-10-11 VITALS
RESPIRATION RATE: 16 BRPM | HEART RATE: 67 BPM | HEIGHT: 63 IN | DIASTOLIC BLOOD PRESSURE: 74 MMHG | WEIGHT: 167 LBS | SYSTOLIC BLOOD PRESSURE: 119 MMHG | TEMPERATURE: 98 F | OXYGEN SATURATION: 98 % | BODY MASS INDEX: 29.59 KG/M2

## 2021-10-11 DIAGNOSIS — R47.9 UNSPECIFIED SPEECH DISTURBANCES: ICD-10-CM

## 2021-10-11 PROCEDURE — 99202 OFFICE O/P NEW SF 15 MIN: CPT

## 2021-10-11 RX ORDER — METRONIDAZOLE 7.5 MG/G
0.75 GEL VAGINAL
Qty: 1 | Refills: 0 | Status: DISCONTINUED | COMMUNITY
Start: 2021-09-27 | End: 2021-10-11

## 2021-10-11 RX ORDER — ATORVASTATIN CALCIUM 40 MG/1
40 TABLET, FILM COATED ORAL DAILY
Refills: 0 | Status: ACTIVE | COMMUNITY
Start: 2021-10-11

## 2021-10-11 RX ORDER — HYDROXYZINE HYDROCHLORIDE 50 MG/1
50 TABLET ORAL
Refills: 0 | Status: ACTIVE | COMMUNITY
Start: 2021-10-11

## 2021-10-11 RX ORDER — ATORVASTATIN CALCIUM 10 MG/1
10 TABLET, FILM COATED ORAL
Qty: 90 | Refills: 3 | Status: DISCONTINUED | COMMUNITY
Start: 2020-12-02 | End: 2021-10-11

## 2021-10-11 NOTE — REVIEW OF SYSTEMS
[Fatigue] : fatigue [Joint Pain] : joint pain [Depression] : depression [Eye Pain] : no eye pain [Earache] : no earache [Chest Pain] : no chest pain [Shortness Of Breath] : no shortness of breath [Abdominal Pain] : no abdominal pain [Skin Rash] : no skin rash [Headache] : no headaches

## 2021-10-11 NOTE — PHYSICAL EXAM
[Normal] : Alert and in no acute distress [de-identified] : EOMI- no nystagmus [de-identified] : Breathing comfortably [de-identified] : alert, awake, severe dysarthria, follows simple commands, EOMI, no obvious facial weakness, Unable to protrude tongue out on command, dyskinesia of orobuccal and face when patient attempts to speak , Tone normal, no rigidity at wrists, Motor 4/5, sensory intact to light touch, Reflexes at biceps, triceps, patella 1+, Gait grossly normal with use of cane [de-identified] : appears anxious at times

## 2021-10-11 NOTE — ASSESSMENT
[FreeTextEntry1] : Ms. Shikha Blandon is a 71 year old female with history of remote stroke, with prior diagnosis of possible tardive dyskinesia while on neuroleptics, now with aide reporting increasing oral and lingual dyskinesias. Patient to be referred to movement disorder neurologist. \par \par Will discuss with PCP, Dr. Page. \par \par Defer Speech therapy at this time until seen by neurology . Patient agreeable.

## 2021-10-11 NOTE — HISTORY OF PRESENT ILLNESS
[FreeTextEntry1] : Ms. Shikha Blandon is a 71 year old female seen in initial visit of worsening of speech and walking. \par Patient's home health aide Liseth assists with translation with patient's permission. \par \par Patient had a stroke in 2006 that resulted in impairment of language and communication. However her aide states that this has gotten worse over the past 3 months and the aide has difficulty understanding. She also reports difficulty with chewing food. She reports that patient was seen by her PCP and referred for therapy with scripts. \par She denies any new weakness. Tongue feels heavy at times. Patient also reports fatigue. \par She also reports increased arthritic pain in her shoulders, hands and knees.\par \addy Childers has been patient's HHA for past about 3 years, gives patient's her medications. She spends several hours 6 days/week. At other times, patient is at home on her own. 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

## 2021-10-11 NOTE — REASON FOR VISIT
[Initial Evaluation] : an initial evaluation [Other: _____] : [unfilled] [FreeTextEntry1] : s/p history of remote CVA

## 2021-10-13 ENCOUNTER — APPOINTMENT (OUTPATIENT)
Dept: CARDIOLOGY | Facility: CLINIC | Age: 71
End: 2021-10-13
Payer: MEDICARE

## 2021-10-13 ENCOUNTER — NON-APPOINTMENT (OUTPATIENT)
Age: 71
End: 2021-10-13

## 2021-10-13 ENCOUNTER — APPOINTMENT (OUTPATIENT)
Dept: OBGYN | Facility: CLINIC | Age: 71
End: 2021-10-13

## 2021-10-13 VITALS
DIASTOLIC BLOOD PRESSURE: 77 MMHG | TEMPERATURE: 98.1 F | OXYGEN SATURATION: 98 % | WEIGHT: 165 LBS | HEART RATE: 73 BPM | HEIGHT: 63 IN | BODY MASS INDEX: 29.23 KG/M2 | RESPIRATION RATE: 19 BRPM | SYSTOLIC BLOOD PRESSURE: 134 MMHG

## 2021-10-13 PROCEDURE — 99215 OFFICE O/P EST HI 40 MIN: CPT

## 2021-10-13 PROCEDURE — 93000 ELECTROCARDIOGRAM COMPLETE: CPT

## 2021-10-13 NOTE — ASSESSMENT
[FreeTextEntry1] : In summary, the patient is a 71-year-old woman with a history of episodic cardiovascular disease and stenting. She has history of a remote cerebrovascular accident as well but this predated the stent by many years. She wishes to have vein stripping but at the same time his complaining of chest discomfort and shortness of breath which could be an anginal equivalent. As mentioned 10 months ago she had a normal vasodilator study.\par \par I advised her to have full blood work to rule out any metabolic abnormalities specifically anemia. If no overt abnormalities will schedule for cardiac catheterization

## 2021-10-13 NOTE — REASON FOR VISIT
[Symptom and Test Evaluation] : symptom and test evaluation [FreeTextEntry1] : Patient presents for cardiac clearance for venous stripping. This was scheduled for last year but was deferred because of the pandemic. However prior to telling me that she is here for clearance she is complaining of midsternal chest pressure that she gets when lifting her grandchild or when lifting up other heavy objects. When asked if she gets this pressure with ambulation she states no blood she states that she gets short of breath on walking a flight of stairs. She states all of these symptoms date back for the last several weeks. She did have a cardiac stent done several years ago at [place] but does report is not available to me at this time of note is the fact that 10 months ago she underwent vasodilator myocardial perfusion imaging that was negative for ischemia.

## 2021-10-18 LAB
ALBUMIN SERPL ELPH-MCNC: 3.9 G/DL
ALP BLD-CCNC: 60 U/L
ALT SERPL-CCNC: 8 U/L
ANION GAP SERPL CALC-SCNC: 12 MMOL/L
AST SERPL-CCNC: 12 U/L
BASOPHILS # BLD AUTO: 0.03 K/UL
BASOPHILS NFR BLD AUTO: 0.4 %
BILIRUB SERPL-MCNC: 0.2 MG/DL
BUN SERPL-MCNC: 17 MG/DL
CALCIUM SERPL-MCNC: 9.1 MG/DL
CHLORIDE SERPL-SCNC: 103 MMOL/L
CHOLEST SERPL-MCNC: 130 MG/DL
CO2 SERPL-SCNC: 23 MMOL/L
CREAT SERPL-MCNC: 0.68 MG/DL
EOSINOPHIL # BLD AUTO: 0.14 K/UL
EOSINOPHIL NFR BLD AUTO: 1.9 %
GLUCOSE SERPL-MCNC: 95 MG/DL
HCT VFR BLD CALC: 40.8 %
HDLC SERPL-MCNC: 52 MG/DL
HGB BLD-MCNC: 12.9 G/DL
IMM GRANULOCYTES NFR BLD AUTO: 0.3 %
LDLC SERPL CALC-MCNC: 64 MG/DL
LYMPHOCYTES # BLD AUTO: 3.42 K/UL
LYMPHOCYTES NFR BLD AUTO: 46.8 %
MAN DIFF?: NORMAL
MCHC RBC-ENTMCNC: 29 PG
MCHC RBC-ENTMCNC: 31.6 GM/DL
MCV RBC AUTO: 91.7 FL
MONOCYTES # BLD AUTO: 0.49 K/UL
MONOCYTES NFR BLD AUTO: 6.7 %
NEUTROPHILS # BLD AUTO: 3.2 K/UL
NEUTROPHILS NFR BLD AUTO: 43.9 %
NONHDLC SERPL-MCNC: 78 MG/DL
PLATELET # BLD AUTO: 280 K/UL
POTASSIUM SERPL-SCNC: 4.4 MMOL/L
PROT SERPL-MCNC: 7 G/DL
RBC # BLD: 4.45 M/UL
RBC # FLD: 14.8 %
SODIUM SERPL-SCNC: 137 MMOL/L
T3 SERPL-MCNC: 101 NG/DL
T4 SERPL-MCNC: 6.3 UG/DL
TRIGL SERPL-MCNC: 74 MG/DL
TSH SERPL-ACNC: 0.61 UIU/ML
WBC # FLD AUTO: 7.3 K/UL

## 2021-10-19 ENCOUNTER — APPOINTMENT (OUTPATIENT)
Dept: DISASTER EMERGENCY | Facility: CLINIC | Age: 71
End: 2021-10-19

## 2021-10-20 ENCOUNTER — APPOINTMENT (OUTPATIENT)
Dept: DISASTER EMERGENCY | Facility: CLINIC | Age: 71
End: 2021-10-20

## 2021-10-20 LAB — SARS-COV-2 N GENE NPH QL NAA+PROBE: NOT DETECTED

## 2021-10-21 ENCOUNTER — OUTPATIENT (OUTPATIENT)
Dept: OUTPATIENT SERVICES | Facility: HOSPITAL | Age: 71
LOS: 1 days | End: 2021-10-21
Payer: COMMERCIAL

## 2021-10-21 VITALS
TEMPERATURE: 99 F | SYSTOLIC BLOOD PRESSURE: 148 MMHG | OXYGEN SATURATION: 97 % | DIASTOLIC BLOOD PRESSURE: 70 MMHG | WEIGHT: 149.91 LBS | RESPIRATION RATE: 16 BRPM | HEART RATE: 62 BPM | HEIGHT: 64 IN

## 2021-10-21 VITALS
DIASTOLIC BLOOD PRESSURE: 63 MMHG | HEART RATE: 62 BPM | SYSTOLIC BLOOD PRESSURE: 143 MMHG | RESPIRATION RATE: 16 BRPM | OXYGEN SATURATION: 95 %

## 2021-10-21 DIAGNOSIS — Z98.891 HISTORY OF UTERINE SCAR FROM PREVIOUS SURGERY: Chronic | ICD-10-CM

## 2021-10-21 DIAGNOSIS — Z95.5 PRESENCE OF CORONARY ANGIOPLASTY IMPLANT AND GRAFT: Chronic | ICD-10-CM

## 2021-10-21 DIAGNOSIS — R07.89 OTHER CHEST PAIN: ICD-10-CM

## 2021-10-21 LAB
ALBUMIN SERPL ELPH-MCNC: 4.1 G/DL — SIGNIFICANT CHANGE UP (ref 3.3–5)
ALP SERPL-CCNC: 66 U/L — SIGNIFICANT CHANGE UP (ref 40–120)
ALT FLD-CCNC: 11 U/L — SIGNIFICANT CHANGE UP (ref 10–45)
ANION GAP SERPL CALC-SCNC: 12 MMOL/L — SIGNIFICANT CHANGE UP (ref 5–17)
AST SERPL-CCNC: 16 U/L — SIGNIFICANT CHANGE UP (ref 10–40)
BILIRUB SERPL-MCNC: 0.3 MG/DL — SIGNIFICANT CHANGE UP (ref 0.2–1.2)
BUN SERPL-MCNC: 18 MG/DL — SIGNIFICANT CHANGE UP (ref 7–23)
CALCIUM SERPL-MCNC: 9.1 MG/DL — SIGNIFICANT CHANGE UP (ref 8.4–10.5)
CHLORIDE SERPL-SCNC: 100 MMOL/L — SIGNIFICANT CHANGE UP (ref 96–108)
CO2 SERPL-SCNC: 24 MMOL/L — SIGNIFICANT CHANGE UP (ref 22–31)
CREAT SERPL-MCNC: 0.66 MG/DL — SIGNIFICANT CHANGE UP (ref 0.5–1.3)
GLUCOSE SERPL-MCNC: 101 MG/DL — HIGH (ref 70–99)
HCT VFR BLD CALC: 40 % — SIGNIFICANT CHANGE UP (ref 34.5–45)
HGB BLD-MCNC: 12.9 G/DL — SIGNIFICANT CHANGE UP (ref 11.5–15.5)
MCHC RBC-ENTMCNC: 28.9 PG — SIGNIFICANT CHANGE UP (ref 27–34)
MCHC RBC-ENTMCNC: 32.3 GM/DL — SIGNIFICANT CHANGE UP (ref 32–36)
MCV RBC AUTO: 89.5 FL — SIGNIFICANT CHANGE UP (ref 80–100)
NRBC # BLD: 0 /100 WBCS — SIGNIFICANT CHANGE UP (ref 0–0)
PLATELET # BLD AUTO: 254 K/UL — SIGNIFICANT CHANGE UP (ref 150–400)
POTASSIUM SERPL-MCNC: 4.2 MMOL/L — SIGNIFICANT CHANGE UP (ref 3.5–5.3)
POTASSIUM SERPL-SCNC: 4.2 MMOL/L — SIGNIFICANT CHANGE UP (ref 3.5–5.3)
PROT SERPL-MCNC: 7.5 G/DL — SIGNIFICANT CHANGE UP (ref 6–8.3)
RBC # BLD: 4.47 M/UL — SIGNIFICANT CHANGE UP (ref 3.8–5.2)
RBC # FLD: 14.8 % — HIGH (ref 10.3–14.5)
SODIUM SERPL-SCNC: 136 MMOL/L — SIGNIFICANT CHANGE UP (ref 135–145)
WBC # BLD: 9.19 K/UL — SIGNIFICANT CHANGE UP (ref 3.8–10.5)
WBC # FLD AUTO: 9.19 K/UL — SIGNIFICANT CHANGE UP (ref 3.8–10.5)

## 2021-10-21 PROCEDURE — 93454 CORONARY ARTERY ANGIO S&I: CPT

## 2021-10-21 PROCEDURE — C1769: CPT

## 2021-10-21 PROCEDURE — 93005 ELECTROCARDIOGRAM TRACING: CPT

## 2021-10-21 PROCEDURE — 93010 ELECTROCARDIOGRAM REPORT: CPT

## 2021-10-21 PROCEDURE — 80053 COMPREHEN METABOLIC PANEL: CPT

## 2021-10-21 PROCEDURE — 93454 CORONARY ARTERY ANGIO S&I: CPT | Mod: 26

## 2021-10-21 PROCEDURE — 85027 COMPLETE CBC AUTOMATED: CPT

## 2021-10-21 PROCEDURE — C1887: CPT

## 2021-10-21 PROCEDURE — C1894: CPT

## 2021-10-21 RX ORDER — HYDROXYCHLOROQUINE SULFATE 200 MG
1 TABLET ORAL
Qty: 0 | Refills: 0 | DISCHARGE

## 2021-10-21 NOTE — H&P CARDIOLOGY - HISTORY OF PRESENT ILLNESS
71 yr old  female with PMHx of Remote CAD/Prior PCI, CVA presents for cardiac clearance for venous stripping. Pt is complaining of midsternal chest pressure that she gets when lifting her grandchild or when lifting up other heavy objects. Pt denies chest pressure with ambulation but reports short of breath on walking a flight of stairs. She states all of these symptoms date back for the last several weeks. She did have a cardiac stent done several years ago at [place]  10 months ago she underwent vasodilator myocardial perfusion imaging that was negative for ischemia.   Pt was referred for Cardiac Cath by Dr. Mccarthy.  71 yr old  female with PMHx of Remote CAD/Prior PCI 2019, CVA presents for cardiac clearance for venous stripping. Pt is complaining of midsternal chest pressure that she gets when lifting her grandchild or when lifting up other heavy objects. Pt denies chest pressure with ambulation but reports short of breath on walking a flight of stairs. She states all of these symptoms date back for the last several weeks. She did have a cardiac stent done several years ago at [place]  10 months ago she underwent vasodilator myocardial perfusion imaging that was negative for ischemia.   Pt was referred for Cardiac Cath by Dr. Mccarthy.      71 yr old  female with PMHx of Remote CAD/Prior PCI 2019, CVA, RA presents for cardiac clearance for venous stripping. Pt is complaining of midsternal chest pressure that she gets when lifting her grandchild or when lifting up other heavy objects. Pt denies chest pressure with ambulation but reports short of breath on walking a flight of stairs. She states all of these symptoms date back for the last several weeks. She did have a cardiac stent done several years ago at [place]  10 months ago she underwent vasodilator myocardial perfusion imaging that was negative for ischemia.   Pt was referred for Cardiac Cath by Dr. Mccarthy.     Pt is poor historian,  ID # [id number].

## 2021-10-21 NOTE — PRE-OP CHECKLIST - AICD PRESENT
Procedure note : Saphenous  Staff -   Anesthesiologist:  Clint Srinivasan DO      Performed By: anesthesiologist        Pre-Procedure  Performed by Clint Srinivasan DO  Location: pre-op      Pre-Anesthestic Checklist: patient identified, IV checked, site marked, risks and benefits discussed, informed consent, monitors and equipment checked, pre-op evaluation, at physician/surgeon's request and post-op pain management    Timeout  Correct Patient: Yes   Correct Procedure: Yes   Correct Site: Yes   Correct Laterality: Yes   Correct Position: Yes   Site Marked: Yes   .   Procedure Documentation    .    Procedure: Saphenous, right.   Patient Position:supine Local skin infiltrated with 3 mL of 1% lidocaine.    Ultrasound used to identify targeted nerve, plexus, or vascular marker and placed a needle adjacent to it., Ultrasound was used to visualize the spread of the anesthetic in close proximity to the above stated nerve. A permanent image is entered into the patient's record.  Patient Prep/Sterile Barriers; mask, sterile gloves, chlorhexidine gluconate and isopropyl alcohol, patient draped.  .  Needle: insulated, short bevel   Needle Gauge: 21.    Needle Length (Inches) 4   Insertion Method: Single Shot.        Assessment/Narrative  Paresthesias: No.  .  The placement was negative for: blood aspirated, painful injection and site bleeding.  Bolus given via needle..   Secured via.   Complications: none. Test dose of mL at. Test dose negative for signs of intravascular, subdural or intrathecal injection. Comments:  Ultrasound Interpretation, peripheral nerve block    1.  Under ultrasound guidance, the needle was inserted and placed in close proximity to the target nerve(s).  2. Ultrasound was also used to visualize the spread of the anesthetic in close proximity to the nerve(s) being blocked.  10 ml of 0.5% Bupivacaine w/ 1:400K Epi, in total, was administered in incremental doses, with intermittent  negative aspiration.     3. The nerve(s) appeared anatomically normal.  4. There were no apparent abnormal pathological findings.  5. A permanent ultrasound image was saved in the patient's record.    Pt tolerated the procedure well.     The surgeon has given a verbal order transferring care of this patient to me for the performance of a regional analgesia block for post-op pain control. It is requested of me because I am uniquely trained and qualified to perform this block and the surgeon is neither trained nor qualified to perform this procedure.             no

## 2021-10-21 NOTE — ASU DISCHARGE PLAN (ADULT/PEDIATRIC) - CARE PROVIDER_API CALL
Sagar Mccarthy)  Cardiology; Internal Medicine  [address], [address]  [place] [address]  Phone: [phone]  Fax: [phone]  Follow Up Time: 2 weeks

## 2021-10-21 NOTE — H&P CARDIOLOGY - NSICDXPASTSURGICALHX_GEN_ALL_CORE_FT
PAST SURGICAL HISTORY:  H/O  section 1986     PAST SURGICAL HISTORY:  H/O  section     History of coronary artery stent placement

## 2021-10-21 NOTE — H&P CARDIOLOGY - NSICDXPASTMEDICALHX_GEN_ALL_CORE_FT
PAST MEDICAL HISTORY:  Acid reflux     Depression     Hypertension     Rotator cuff tear     Stroke 2012     PAST MEDICAL HISTORY:  Acid reflux     Depression     Hypertension     Rheumatoid arthritis, seronegative, shoulder     Rotator cuff tear     Stroke 2012

## 2021-10-22 ENCOUNTER — APPOINTMENT (OUTPATIENT)
Dept: ULTRASOUND IMAGING | Facility: HOSPITAL | Age: 71
End: 2021-10-22

## 2021-10-22 ENCOUNTER — APPOINTMENT (OUTPATIENT)
Dept: RADIOLOGY | Facility: HOSPITAL | Age: 71
End: 2021-10-22

## 2021-10-22 ENCOUNTER — APPOINTMENT (OUTPATIENT)
Dept: MAMMOGRAPHY | Facility: HOSPITAL | Age: 71
End: 2021-10-22

## 2021-10-22 ENCOUNTER — APPOINTMENT (OUTPATIENT)
Dept: RHEUMATOLOGY | Facility: CLINIC | Age: 71
End: 2021-10-22

## 2021-10-22 PROBLEM — M06.019: Chronic | Status: ACTIVE | Noted: 2021-10-21

## 2021-10-27 ENCOUNTER — APPOINTMENT (OUTPATIENT)
Dept: CARDIOLOGY | Facility: CLINIC | Age: 71
End: 2021-10-27
Payer: MEDICARE

## 2021-10-27 VITALS — SYSTOLIC BLOOD PRESSURE: 120 MMHG | DIASTOLIC BLOOD PRESSURE: 70 MMHG

## 2021-10-27 VITALS
HEART RATE: 66 BPM | BODY MASS INDEX: 30.65 KG/M2 | DIASTOLIC BLOOD PRESSURE: 80 MMHG | TEMPERATURE: 97 F | RESPIRATION RATE: 16 BRPM | WEIGHT: 173 LBS | OXYGEN SATURATION: 100 % | HEIGHT: 63 IN | SYSTOLIC BLOOD PRESSURE: 145 MMHG

## 2021-10-27 VITALS — HEART RATE: 64 BPM

## 2021-10-27 DIAGNOSIS — I10 ESSENTIAL (PRIMARY) HYPERTENSION: ICD-10-CM

## 2021-10-27 PROCEDURE — 99214 OFFICE O/P EST MOD 30 MIN: CPT

## 2021-10-27 PROCEDURE — 93000 ELECTROCARDIOGRAM COMPLETE: CPT

## 2021-10-27 NOTE — ASSESSMENT
[FreeTextEntry1] : Impression:\par 1. Coronary disease status post remote stenting now with nonobstructive disease\par 2. Hypertension well controlled\par 3. Hyperlipidemia at goal \par 4. Noncardiac chest pain\par \par Plan:\par 1. Continue current regimen\par 2. No cardiac contraindication to venous stripping procedure

## 2021-10-27 NOTE — REASON FOR VISIT
[Symptom and Test Evaluation] : symptom and test evaluation [Coronary Artery Disease] : coronary artery disease [FreeTextEntry1] : She returns for followup patient underwent cardiac catheterization. She had no evidence of obstructive disease.Most recent lipid profile reveals total cholesterol 1:30 HDL 52 LDL 64

## 2021-12-29 ENCOUNTER — APPOINTMENT (OUTPATIENT)
Dept: RADIOLOGY | Facility: HOSPITAL | Age: 71
End: 2021-12-29
Payer: MEDICARE

## 2021-12-29 ENCOUNTER — OUTPATIENT (OUTPATIENT)
Dept: OUTPATIENT SERVICES | Facility: HOSPITAL | Age: 71
LOS: 1 days | End: 2021-12-29
Payer: MEDICARE

## 2021-12-29 DIAGNOSIS — Z98.891 HISTORY OF UTERINE SCAR FROM PREVIOUS SURGERY: Chronic | ICD-10-CM

## 2021-12-29 DIAGNOSIS — Z95.5 PRESENCE OF CORONARY ANGIOPLASTY IMPLANT AND GRAFT: Chronic | ICD-10-CM

## 2021-12-29 DIAGNOSIS — Z00.8 ENCOUNTER FOR OTHER GENERAL EXAMINATION: ICD-10-CM

## 2021-12-29 PROCEDURE — 71046 X-RAY EXAM CHEST 2 VIEWS: CPT

## 2021-12-29 PROCEDURE — 71046 X-RAY EXAM CHEST 2 VIEWS: CPT | Mod: 26

## 2022-01-08 ENCOUNTER — EMERGENCY (EMERGENCY)
Facility: HOSPITAL | Age: 72
LOS: 1 days | Discharge: HOME | End: 2022-01-08
Attending: EMERGENCY MEDICINE | Admitting: EMERGENCY MEDICINE
Payer: MEDICARE

## 2022-01-08 VITALS
HEART RATE: 70 BPM | DIASTOLIC BLOOD PRESSURE: 89 MMHG | RESPIRATION RATE: 18 BRPM | TEMPERATURE: 98 F | SYSTOLIC BLOOD PRESSURE: 151 MMHG | OXYGEN SATURATION: 95 %

## 2022-01-08 VITALS
DIASTOLIC BLOOD PRESSURE: 75 MMHG | RESPIRATION RATE: 16 BRPM | OXYGEN SATURATION: 97 % | WEIGHT: 169.98 LBS | TEMPERATURE: 98 F | HEART RATE: 67 BPM | HEIGHT: 63 IN | SYSTOLIC BLOOD PRESSURE: 143 MMHG

## 2022-01-08 DIAGNOSIS — Z98.891 HISTORY OF UTERINE SCAR FROM PREVIOUS SURGERY: Chronic | ICD-10-CM

## 2022-01-08 DIAGNOSIS — Z95.5 PRESENCE OF CORONARY ANGIOPLASTY IMPLANT AND GRAFT: Chronic | ICD-10-CM

## 2022-01-08 LAB
ALBUMIN SERPL ELPH-MCNC: 3.3 G/DL — SIGNIFICANT CHANGE UP (ref 3.3–5)
ALP SERPL-CCNC: 56 U/L — SIGNIFICANT CHANGE UP (ref 40–120)
ALT FLD-CCNC: 19 U/L — SIGNIFICANT CHANGE UP (ref 10–45)
ANION GAP SERPL CALC-SCNC: 5 MMOL/L — SIGNIFICANT CHANGE UP (ref 5–17)
AST SERPL-CCNC: 27 U/L — SIGNIFICANT CHANGE UP (ref 10–40)
BASOPHILS # BLD AUTO: 0.02 K/UL — SIGNIFICANT CHANGE UP (ref 0–0.2)
BASOPHILS NFR BLD AUTO: 0.4 % — SIGNIFICANT CHANGE UP (ref 0–2)
BILIRUB SERPL-MCNC: 0.6 MG/DL — SIGNIFICANT CHANGE UP (ref 0.2–1.2)
BUN SERPL-MCNC: 21 MG/DL — SIGNIFICANT CHANGE UP (ref 7–23)
CALCIUM SERPL-MCNC: 8.8 MG/DL — SIGNIFICANT CHANGE UP (ref 8.4–10.5)
CHLORIDE SERPL-SCNC: 99 MMOL/L — SIGNIFICANT CHANGE UP (ref 96–108)
CO2 SERPL-SCNC: 28 MMOL/L — SIGNIFICANT CHANGE UP (ref 22–31)
CREAT SERPL-MCNC: 0.95 MG/DL — SIGNIFICANT CHANGE UP (ref 0.5–1.3)
EOSINOPHIL # BLD AUTO: 0.14 K/UL — SIGNIFICANT CHANGE UP (ref 0–0.5)
EOSINOPHIL NFR BLD AUTO: 2.9 % — SIGNIFICANT CHANGE UP (ref 0–6)
GLUCOSE SERPL-MCNC: 130 MG/DL — HIGH (ref 70–99)
HCT VFR BLD CALC: 39 % — SIGNIFICANT CHANGE UP (ref 34.5–45)
HGB BLD-MCNC: 12.6 G/DL — SIGNIFICANT CHANGE UP (ref 11.5–15.5)
IMM GRANULOCYTES NFR BLD AUTO: 0.2 % — SIGNIFICANT CHANGE UP (ref 0–1.5)
LYMPHOCYTES # BLD AUTO: 2.47 K/UL — SIGNIFICANT CHANGE UP (ref 1–3.3)
LYMPHOCYTES # BLD AUTO: 50.9 % — HIGH (ref 13–44)
MCHC RBC-ENTMCNC: 29.7 PG — SIGNIFICANT CHANGE UP (ref 27–34)
MCHC RBC-ENTMCNC: 32.3 GM/DL — SIGNIFICANT CHANGE UP (ref 32–36)
MCV RBC AUTO: 92 FL — SIGNIFICANT CHANGE UP (ref 80–100)
MONOCYTES # BLD AUTO: 0.46 K/UL — SIGNIFICANT CHANGE UP (ref 0–0.9)
MONOCYTES NFR BLD AUTO: 9.5 % — SIGNIFICANT CHANGE UP (ref 2–14)
NEUTROPHILS # BLD AUTO: 1.75 K/UL — LOW (ref 1.8–7.4)
NEUTROPHILS NFR BLD AUTO: 36.1 % — LOW (ref 43–77)
NRBC # BLD: 0 /100 WBCS — SIGNIFICANT CHANGE UP (ref 0–0)
PLATELET # BLD AUTO: 197 K/UL — SIGNIFICANT CHANGE UP (ref 150–400)
POTASSIUM SERPL-MCNC: 4.2 MMOL/L — SIGNIFICANT CHANGE UP (ref 3.5–5.3)
POTASSIUM SERPL-SCNC: 4.2 MMOL/L — SIGNIFICANT CHANGE UP (ref 3.5–5.3)
PROT SERPL-MCNC: 7.6 G/DL — SIGNIFICANT CHANGE UP (ref 6–8.3)
RBC # BLD: 4.24 M/UL — SIGNIFICANT CHANGE UP (ref 3.8–5.2)
RBC # FLD: 14.6 % — HIGH (ref 10.3–14.5)
SODIUM SERPL-SCNC: 132 MMOL/L — LOW (ref 135–145)
WBC # BLD: 4.85 K/UL — SIGNIFICANT CHANGE UP (ref 3.8–10.5)
WBC # FLD AUTO: 4.85 K/UL — SIGNIFICANT CHANGE UP (ref 3.8–10.5)

## 2022-01-08 PROCEDURE — 99284 EMERGENCY DEPT VISIT MOD MDM: CPT

## 2022-01-08 PROCEDURE — 36415 COLL VENOUS BLD VENIPUNCTURE: CPT

## 2022-01-08 PROCEDURE — 99283 EMERGENCY DEPT VISIT LOW MDM: CPT | Mod: 25

## 2022-01-08 PROCEDURE — 71045 X-RAY EXAM CHEST 1 VIEW: CPT

## 2022-01-08 PROCEDURE — 71045 X-RAY EXAM CHEST 1 VIEW: CPT | Mod: 26

## 2022-01-08 PROCEDURE — 94640 AIRWAY INHALATION TREATMENT: CPT

## 2022-01-08 PROCEDURE — 85025 COMPLETE CBC W/AUTO DIFF WBC: CPT

## 2022-01-08 PROCEDURE — 80053 COMPREHEN METABOLIC PANEL: CPT

## 2022-01-08 RX ORDER — SODIUM CHLORIDE 9 MG/ML
1000 INJECTION INTRAMUSCULAR; INTRAVENOUS; SUBCUTANEOUS ONCE
Refills: 0 | Status: COMPLETED | OUTPATIENT
Start: 2022-01-08 | End: 2022-01-08

## 2022-01-08 RX ORDER — ALBUTEROL 90 UG/1
2 AEROSOL, METERED ORAL
Qty: 1 | Refills: 0
Start: 2022-01-08 | End: 2022-01-14

## 2022-01-08 RX ORDER — CLARITHROMYCIN 500 MG
1 TABLET ORAL
Qty: 1 | Refills: 0
Start: 2022-01-08 | End: 2022-01-12

## 2022-01-08 RX ORDER — ALBUTEROL 90 UG/1
8 AEROSOL, METERED ORAL ONCE
Refills: 0 | Status: COMPLETED | OUTPATIENT
Start: 2022-01-08 | End: 2022-01-08

## 2022-01-08 RX ORDER — GUAIFENESIN/DEXTROMETHORPHAN 600MG-30MG
10 TABLET, EXTENDED RELEASE 12 HR ORAL ONCE
Refills: 0 | Status: COMPLETED | OUTPATIENT
Start: 2022-01-08 | End: 2022-01-08

## 2022-01-08 RX ADMIN — SODIUM CHLORIDE 1000 MILLILITER(S): 9 INJECTION INTRAMUSCULAR; INTRAVENOUS; SUBCUTANEOUS at 19:17

## 2022-01-08 RX ADMIN — ALBUTEROL 8 PUFF(S): 90 AEROSOL, METERED ORAL at 19:32

## 2022-01-08 RX ADMIN — Medication 10 MILLILITER(S): at 19:32

## 2022-01-08 NOTE — ED PROVIDER NOTE - PATIENT PORTAL LINK FT
You can access the FollowMyHealth Patient Portal offered by [facility] by registering at the following website: [email or web address]. By joining [email or web address]’s FollowMyHealth portal, you will also be able to view your health information using other applications (apps) compatible with our system.

## 2022-01-08 NOTE — ED ADULT NURSE NOTE - NSICDXPASTMEDICALHX_GEN_ALL_CORE_FT
PAST MEDICAL HISTORY:  Acid reflux     Depression     Hypertension     Rheumatoid arthritis, seronegative, shoulder     Rotator cuff tear     Stroke 2012

## 2022-01-08 NOTE — ED PROVIDER NOTE - NSFOLLOWUPINSTRUCTIONS_ED_ALL_ED_FT
Hermelindo un seguimiento con drake PMD dentro de 1-2 días.   Descansa, aumenta tus fluidos.   [place] Tylenol 650mg cada 4-6 horas según sea necesario para la temperatura >99.9  Consulte la información / hoja informativa adjunta a COVID-19.   [place] un Multivitamínico diariamente.   Por favor, quédese en casa y póngase en cuarentena hasta que obtengamos maximiliano resultados de vuelta.   Enviamos glen prueba para el virus COVID-19 que tarda hasta 2-3 días en resultar. Nos pongamos en contacto con usted si hay algún hallazgo. Si es positivo, tendrá que quedarse en casa taya 14 días.   Empeoramiento, continuación o CUALQUIER nuevo respecto a los síntomas regresan al departamento de emergencias. COVID-19 (Enfermedad por coronavirus 2019)    LO QUE NECESITA SABER:    ¿Qué necesito saber acerca de la COVID-19?La COVID-19 es la enfermedad causada por el nuevo coronavirus descubierto por primera vez en diciembre de 2019. Los coronavirus generalmente causan infecciones de las vías respiratorias superiores (nariz, garganta y pulmones), macario un resfriado. El virus de 2019 se propaga rápida y fácilmente. Puede transmitirse a partir de 2 a 3 días antes de que comiencen los síntomas.    ¿Qué necesito saber acerca de las variantes?El virus ha cambiado en varias formas nuevas (llamadas variantes) desde que se descubrió. Las variantes pueden ser más contagiosas (se propagan fácilmente) que la forma original. Además, algunas pueden causar glen enfermedad más grave que otras.    ¿Cuáles son los signos y síntomas de la COVID-19?Es posible que no presente ningún signo o síntoma. Los signos y síntomas suelen empezar unos 5 días después de la infección will pueden tardar de 2 a 14 días. Puede sentir macario si tuviera gripe o un resfriado iraida. Algunos signos y síntomas desaparecen en unos pocos días. Otros pueden durar semanas, meses o posiblemente años. Puede presentar cualquiera de los siguientes signos o síntomas:   •Tos      •Falta de aliento o dificultad para respirar que puede llegar a ser grave      •Fiebre      •Escalofríos que pueden incluir temblores      •Dolor muscular, chyna corporales o dolor de angel luis      •El dolor de garganta      •Cambios repentinos o pérdida del gusto o el olfato      •Sensación de cansancio físico y mental (fatiga)      •Congestión (de la nariz y la angel luis) o flujo nasal      •Diarrea, náuseas o vómitos      ¿Cómo se diagnostica la COVID-19?Las pruebas se ofrecen en muchos sitios. Es posible que deba permanecer en cuarentena hasta que tenga los resultados. Es posible que se use cualquiera de lo siguiente:   •Glen prueba PCR viralmuestra si tiene glen infección actualmente. Se tenisha glen muestra de la nariz o la garganta con un hisopo. Es posible que tenga que esperar 1 día o más para obtener los resultados de la prueba.      •Glen prueba de antígenosmuestra si tiene glen proteína del virus de la COVID-19. Esta prueba suele denominarse prueba rápida porque los resultados pueden estar disponibles en 30 minutos o menos.      •Glen prueba de anticuerposmuestra si tuvo glen infección reciente o en el pasado. Para esta prueba se utilizan muestras de reji. Los anticuerpos son producidos por el sistema inmunitario para atacar el virus que causa la COVID-19. Los anticuerpos se solis de 1 a 3 semanas después de que se contagie. Esta prueba no se utiliza para demostrar si se es inmune al virus.      •Glen TAC, glen RMN, glen ecografía o glen radiografíapodrían realizarse para comprobar si tiene existen complicaciones de la COVID-19. Por ejemplo, neumonía, coágulos de reji u otras complicaciones.      ¿Cómo se trata la COVID-19?  •Los síntomas levespodrían mejorar por sí solos. Algunos tratamientos cuentan con glen autorización de uso de emergencia (EUA). Por ejemplo, los anticuerpos monoclonales y el plasma de convaleciente. Es posible que se administren para ayudar a prevenir el empeoramiento de los síntomas. Es posible que también necesite alguno de los siguientes tratamientos:?Los descongestivosayudan a reducir la congestión nasal y ayudan a respirar más fácilmente. Si tenisha pastillas descongestivas, pueden causarle agitación o problemas para dormir. No use aerosol descongestionante por más de unos cuantos días.      ?Los jarabes para la tosayudan a reducir la tos. Pregúntele a drake médico cuál tipo de medicamento para la tos es mejor para usted.      ?Para aliviar el dolor de garganta,hermelindo gárgaras con agua salada tibia, o use pastillas para la garganta o un aerosol para la garganta. Valentina más líquidos para disolver y aflojar la mucosidad y para prevenir la deshidratación.      ?Los CHEPE o el acetaminofenopueden ayudar a bajar la fiebre y aliviar los chyna corporales o el dolor de angel luis. Siga las indicaciones. Si no se colleen correctamente, los CHEPE pueden causar sangrado estomacal o daño renal y el acetaminofeno puede dañar hepático.      •Los síntomas severos o potencialmente mortalesse tratan en el hospital. Es posible que usted necesite alguno de los siguientes: ?Los medicamentospodrían administrarse para combatir el virus o tratar glen inflamación.      ?Los anticoagulantesayudan a prevenir o tratar los coágulos de reji. Si tiene trombosis venosa profunda (TVP) o embolia pulmonar (EP), charly vez necesite seguir usando anticoagulantes taya al menos 3 meses.      ?El oxígeno adicionalpodría administrarse si tiene insuficiencia respiratoria. Romeo significa que los pulmones no pueden llevar suficiente oxígeno a la reji y a los órganos.      ?Un respiradorpodría usarse para ayudarlo a respirar.        ¿Qué maurice saber sobre los problemas de jerad que puede causar el virus?Puede desarrollar problemas de jerad a lambert plazo causados por el virus. El riesgo es mayor si usted tiene 65 años o más. Un sistema inmunitario débil, la obesidad, diabetes o enfermedad cardíaca o pulmonar también pueden aumentar el riesgo. El riesgo también es mayor si usted es o ha sido fumador de cigarrillos. La COVID-19 puede mike lugar a cualquiera de las siguientes situaciones:  •Síndrome multisistémico inflamatorio en adultos (MIS-A) o en niños (MIS-C), que causa inflamación en el corazón, el sistema digestivo, la piel o el cerebro      •Falta de aliento, afecciones respiratorias inferiores graves, macario neumonía o síndrome de dificultad respiratoria aguda (SDRA).      •Coágulos de reji o daños en los vasos sanguíneos      •Daños en los órganos por falta de oxígeno o por coágulos de reji      •Problemas para dormir      •Problemas para pensar claramente, para recordar información o para concentrarse      •Cambios en el estado de ánimo, depresión o ansiedad      •Problemas con el gusto o el olfato a lambert plazo      •Pérdida del apetito y de peso      •Dolor en los nervios      •Fatiga (sensación de cansancio físico y mental)      ¿Cómo se propaga el coronavirus 2019?  •Las gotitas son la principal forma de propagación de todos los coronavirus.El virus viaja en las gotitas que se solis cuando glen persona habla, canta, tose o estornuda. Las gotitas también pueden flotar en el aire por minutos u horas. La infección se produce cuando respira las gotitas o cuando le tocan los ojos o la nariz. El contacto personal cercano con glen persona infectada aumenta el riesgo de infección. Romeo significa estar a menos de 6 pies (2 metros) de distancia de la persona taya al menos 15 minutos en 24 horas.      •El contacto de persona a persona puede propagar el virus.Por ejemplo, glen persona con el virus en sara ronak puede propagarlo al darle la mano a alguien.      •El virus puede permanecer en objetos y superficies hasta 3 días.Puede infectarse si toca el objeto o la superficie y luego se toca los ojos o la boca.      ¿Qué necesito saber acerca de las vacunas contra la COVID-19?Los médicos recomiendan la vacuna contra la COVID-19, incluso si ya tuvo COVID-19. Se considera que usted está completamente vacunado contra la COVID-19 dos semanas después de la última dosis de cualquier vacuna contra COVID-19. Informe a drake médico cuando haya recibido la última dosis de la vacuna. Hermelindo glen copia de drake tarjeta de vacunación. Conserve el original en quincy de que tenga que mostrarlo. Mantenga la copia en un lugar seguro.  •Las vacunas contra la COVID-19 se administran en forma de inyección en 1 o 2 dosis.  Se recomienda la vacunación a todas las personas mayores de 5 años. Glen vacuna de 2 dosis está plenamente aprobada para los mayores de 16 años. Esta vacuna también cuenta con glen autorización para uso de emergencia (EUA) para niños de 5 a 15 años. Actualmente no se dispone de glen vacuna para los niños menores de 5 años. También se recomienda glen dosis adicional (de refuerzo) para todos los adultos mayores de 18 años. La dosis de refuerzo puede ser glen real diferente de la vacuna contra la COVID-19 que recibió originalmente. El momento de la dosis de refuerzo depende del tipo de vacuna que haya recibido:?Vacuna de 1 dosis:La dosis de refuerzo se administra al menos 2 meses después de viraj recibido la vacuna.      ?Vacuna de 2 dosis:La dosis de refuerzo se administra al menos 6 meses después de la segunda dosis.    Calendario de vacunación contra la COVID-19         •Continúe con el distanciamiento social y otras medidas, incluso después de recibir la vacuna.Aunque no es frecuente, puede contagiarse después de recibir la vacuna. También puede transmitir el virus a otras personas sin saber que tiene la infección.      •Después de vacunarse, compruebe las normas de viaje locales, nacionales e internacionales.Es posible que tenga que hacerse la prueba antes de viajar. Algunos países exigen evidencia de que la prueba es negativa antes de viajar. Charly vez también sea necesario hacer cuarentena tras drake regreso.      ¿Cómo puede ayudar a reducir el riesgo de COVID-19?  •Lávese las ronak con frecuencia taya el día.Utilice agua y jabón. Frótese las ronak enjabonadas, entrelazando los dedos, taya al menos 20 segundos. Enjuáguese con agua corriente caliente. Séquese las ronak con glen toalla limpia o glen toalla de papel. Puede usar un desinfectante para ronak que contenga alcohol, si no hay agua y jabón disponibles. Enseñe a los niños a lavarse las ronak y a usar el desinfectante de ronak.  Lavado de ronak           •Cúbrase al toser y estornudar.Gire la seth y cúbrase la boca y la nariz con un pañuelo. Deseche el pañuelo. Use el ángulo del brazo si no tiene un pañuelo disponible. Luego lávese las ronak con agua y jabón o use un desinfectante de ronak. Enséñeles a los niños a cubrirse al toser o estornudar.      •Use un tapabocas (máscara) cuando sea necesario.Utilice un tapabocas de reinier con al menos 2 capas. También puede crear capas colocando un tapabocas de reinier sobre glen máscara no médica desechable. Cúbrase la boca y la nariz.  Cómo usar un tapabocas (mascarilla)           •Siga las pautas de distanciamiento social a nivel local, nacional y mundial.Mantenga al menos 6 pies (2 metros) de distancia entre usted y los demás.      •Trate de no tocarse la seth.Si tiene el virus en las ronak, puede transferirlo a los ojos, la nariz o la boca e infectarse. También puede transferirlo a los objetos, las superficies o las personas.      •Limpie y desinfecte a menudo los objetos y las superficies de alto contacto.Use toallitas húmedas desinfectantes o hermelindo glen solución de 4 cucharaditas de lejía en 1 cuarto de galón (4 tazas) de agua.      •Pregunte sobre otras vacunas que usted puede necesitar.Debe recibir la vacuna contra la influenza (gripe) tan pronto macario se lo recomienden cada año, generalmente en septiembre u octubre. Vacúnese contra la neumonía si se recomienda. Drake médico puede indicarle si también debe recibir otras vacunas, y cuándo aplicárselas.    Prevenga la infección por COVID-19         ¿Cómo sigo las pautas de distanciamiento social para ayudar a reducir el riesgo de COVID-19?Las normas de distanciamiento social nacionales y locales varían. Las reglas y restricciones pueden cambiar con el tiempo a medida que se levantan las restricciones. Las siguientes son reglas generales al respecto:   •Quédese en casa si está enfermo o ana m que puede tener COVID-19.Es importante que se quede en casa si está esperando glen vincenzo para glen prueba o los resultados de glen prueba.      •Evite el contacto físico cercano con nadie que no viva en drake casa.No le dé la mano, abrace o bese a glen persona macario saludo. Si tiene que usar el transporte público (macario el autobús o el metro), intente sentarse o pararse lejos de los demás. Use el tapabocas.      •Evite las reuniones en persona y las multitudes.Asista virtualmente, si es posible.      ¿Dónde puedo obtener más información?  •Centers for Disease Control and Prevention  [address] [place] [address]  Phone: 1-781.745.3305  Web Address: http://www.cdc.gov        Llame al número de emergencias local (911 en los [place]) si:  •Usted tiene dificultad para respirar o falta de aliento mientras descansa.      •Usted siente presión o dolor en el pecho que dura más de 5 minutos.      •Usted tiene confusión o es difícil despertarlo.      •Sara labios o seth están azules.      ¿Cuándo maurice buscar atención inmediata?  •Usted tiene fiebre de 104 °F (40 °C) o más.          ¿Cuándo maurice llamar a mi médico?  •Usted tiene síntomas de COVID-19.      •Usted tiene preguntas o inquietudes acerca de drake condición o cuidado.      ACUERDOS SOBRE DRAKE CUIDADO:    Usted tiene el derecho de ayudar a planear drake cuidado. Aprenda todo lo que pueda sobre drake condición y macario darle tratamiento. Discuta sara opciones de tratamiento con sara médicos para decidir el cuidado que usted desea recibir. Usted siempre tiene el derecho de rechazar el tratamiento.

## 2022-01-08 NOTE — ED PROVIDER NOTE - ATTENDING CONTRIBUTION TO CARE
Dr. Guzman: I performed a face to face bedside interview with patient regarding history of present illness, review of symptoms and past medical history. I completed an independent physical exam.  I have discussed patient's plan of care with PA.   I agree with note as stated above, having amended the EMR as needed to reflect my findings.   This includes HISTORY OF PRESENT ILLNESS, HIV, PAST MEDICAL/SURGICAL/FAMILY/SOCIAL HISTORY, ALLERGIES AND HOME MEDICATIONS, REVIEW OF SYSTEMS, PHYSICAL EXAM, and any PROGRESS NOTES during the time I functioned as the attending physician for this patient.    dr guzman: Via   Ratna # [id number]:  70 y/o F with h/o HTN, CVA COVID (+) 1/4/21 c/o productive cough with yellow sputum, decreased PO intake, bodyaches, generalized weakness and sore throat x 5 days not improving. Denies abdominal pain, n/v/d, CP, shortness of breath. Took Motrin at 4pm.   NOT COVID vaccinated.  No smoking hx  Plan: Will obtain basic labs, check CXR, give Albuterol/Robitussin and reassess

## 2022-01-08 NOTE — ED PROVIDER NOTE - CLINICAL SUMMARY MEDICAL DECISION MAKING FREE TEXT BOX
Via  # Mary # [id number]:  70 y/o F with h/o HTN, CVA COVID (+) 1/4/21 c/o productive cough with yellow sputum, decreased PO intake, bodyaches, generalized weakness and sore throat x 5 days not improving. Denies abdominal pain, n/v/d, CP, shortness of breath. Took Motrin at 4pm.   NOT COVID vaccinated.  No smoking hx  Plan: Will obtain basic labs, check CXR, give Albuterol/Robitussin and reassess Via  Mary # [id number]:  70 y/o F with h/o HTN, CVA COVID (+) 1/4/21 c/o productive cough with yellow sputum, decreased PO intake, bodyaches, generalized weakness and sore throat x 5 days not improving. Denies abdominal pain, n/v/d, CP, shortness of breath. Took Motrin at 4pm.   NOT COVID vaccinated.  No smoking hx  Plan: Will obtain basic labs, check CXR, give Albuterol/Robitussin and reassess  **update: Myoclonal ab referral offered but declined. Patient daughter INSISTING on ABX. Dr. Page does rx Z-pack for his COVID patients. Rx for Z-pack and Albuterol sent to pharmacy. Via   Ratna # [id number]:  72 y/o F with h/o HTN, CVA COVID (+) 1/4/21 c/o productive cough with yellow sputum, decreased PO intake, bodyaches, generalized weakness and sore throat x 5 days not improving. Denies abdominal pain, n/v/d, CP, shortness of breath. Took Motrin at 4pm.   NOT COVID vaccinated.  No smoking hx  Plan: Will obtain basic labs, check CXR, give Albuterol/Robitussin and reassess  **update: Myoclonal ab referral offered but declined. Patient daughter INSISTING on ABX. Dr. Page does rx Z-pack for his COVID patients. Rx for Z-pack and Albuterol sent to pharmacy.

## 2022-01-08 NOTE — ED ADULT TRIAGE NOTE - CHIEF COMPLAINT QUOTE
She tested positive for covid and doesn't feel well, she has throat pain, fever and body aches and feels bad. Last given Motrin at 4 pm.

## 2022-01-08 NOTE — ED PROVIDER NOTE - OBJECTIVE STATEMENT
Via  # Mary # [id number]:  72 y/o F with h/o HTN, CVA COVID (+) 1/4/21 c/o productive cough with yellow sputum, decreased PO intake, bodyaches, generalized weakness and sore throat x 5 days not improving. Denies abdominal pain, n/v/d, CP, shortness of breath. Took Motrin at 4pm.   NOT COVID vaccinated.  No smoking hx Via   Mary # [id number]:  72 y/o F with h/o HTN, CVA COVID (+) 1/4/21 c/o productive cough with yellow sputum, decreased PO intake, bodyaches, generalized weakness and sore throat x 5 days not improving. Denies abdominal pain, n/v/d, CP, shortness of breath. Took Motrin at 4pm.   NOT COVID vaccinated.  No smoking hx Via   Ratna # [id number]:  70 y/o F with h/o HTN, CVA COVID (+) 1/4/21 c/o productive cough with yellow sputum, decreased PO intake, bodyaches, generalized weakness and sore throat x 5 days not improving. Denies abdominal pain, n/v/d, CP, shortness of breath. Took Motrin at 4pm.   NOT COVID vaccinated.  No smoking hx

## 2022-01-12 NOTE — CHART NOTE - NSCHARTNOTEFT_GEN_A_CORE
SW called patient to discuss and assist with follow up care.  SW used [place] interpretor ID # [id number]. Patient presented to ED on 1/8/22 for sore throat.  Patient reports having assistance if needed. Patient reports having all necessities.  Patient reports she has follow up appt with PMD Dr Page. on Monday 1/17/22.  No safety concerns noted. No SW assistance needed at this time.

## 2022-01-31 NOTE — ASU PATIENT PROFILE, ADULT - LANGUAGE ASSISTANCE NEEDED
No-Patient/Caregiver offered and refused free interpretation services. RW/independent/needs device Yes-Patient/Caregiver accepts free interpretation services...

## 2022-02-10 ENCOUNTER — EMERGENCY (EMERGENCY)
Facility: HOSPITAL | Age: 72
LOS: 1 days | Discharge: HOME | End: 2022-02-10
Attending: INTERNAL MEDICINE | Admitting: INTERNAL MEDICINE
Payer: MEDICARE

## 2022-02-10 VITALS
HEART RATE: 78 BPM | WEIGHT: 169.98 LBS | DIASTOLIC BLOOD PRESSURE: 78 MMHG | SYSTOLIC BLOOD PRESSURE: 150 MMHG | RESPIRATION RATE: 16 BRPM | OXYGEN SATURATION: 97 % | TEMPERATURE: 98 F | HEIGHT: 63 IN

## 2022-02-10 VITALS
OXYGEN SATURATION: 95 % | TEMPERATURE: 99 F | SYSTOLIC BLOOD PRESSURE: 137 MMHG | RESPIRATION RATE: 18 BRPM | DIASTOLIC BLOOD PRESSURE: 77 MMHG | HEART RATE: 79 BPM

## 2022-02-10 DIAGNOSIS — Z95.5 PRESENCE OF CORONARY ANGIOPLASTY IMPLANT AND GRAFT: Chronic | ICD-10-CM

## 2022-02-10 DIAGNOSIS — Z98.891 HISTORY OF UTERINE SCAR FROM PREVIOUS SURGERY: Chronic | ICD-10-CM

## 2022-02-10 PROCEDURE — 70450 CT HEAD/BRAIN W/O DYE: CPT | Mod: 26,MA

## 2022-02-10 PROCEDURE — 99284 EMERGENCY DEPT VISIT MOD MDM: CPT | Mod: 25

## 2022-02-10 PROCEDURE — 99284 EMERGENCY DEPT VISIT MOD MDM: CPT

## 2022-02-10 PROCEDURE — 70450 CT HEAD/BRAIN W/O DYE: CPT | Mod: MA

## 2022-02-10 RX ORDER — ACETAMINOPHEN 500 MG
650 TABLET ORAL ONCE
Refills: 0 | Status: COMPLETED | OUTPATIENT
Start: 2022-02-10 | End: 2022-02-10

## 2022-02-10 RX ADMIN — Medication 650 MILLIGRAM(S): at 16:23

## 2022-02-10 NOTE — ED PROVIDER NOTE - OBJECTIVE STATEMENT
71 yr old female with hx of HTN, HLD, CVA with residual slurred speech on plavix, presents with nurse aid at bedside s/p fall 2 weeks ago, c/o persistent generalized headache. Pt not seen by provider s/p fall. Pt had slip and fall when walking out of MD office, and hit head. No LOC. Pt ambulating. No other injuries.   Nurse aid 5 hours a day, pt lives alone   Pt sent from Dr. Page office for CT head. Pt did not take pain meds today.

## 2022-02-10 NOTE — ED ADULT NURSE NOTE - OBJECTIVE STATEMENT
Pt states she had a headache and palpitations today. Denies shortness of breath, chest pain, N/V/D. Pt has an aide with her due to hx of CVA with residual.

## 2022-02-10 NOTE — ED PROVIDER NOTE - DOMESTIC TRAVEL HIGH RISK QUESTION
Pt arrived for vomiting starting this morning and UTI starting last week.   Pt completed antibiotic regimen yesterday No

## 2022-02-10 NOTE — ED PROVIDER NOTE - CLINICAL SUMMARY MEDICAL DECISION MAKING FREE TEXT BOX
71 yr old female with hx of HTN, HLD, CVA with residual slurred speech on plavix, presents with nurse aid at bedside s/p fall 2 weeks ago, c/o persistent generalized headache. Pt not seen by provider s/p fall. Pt had slip and fall when walking out of MD office, and hit head. No LOC. Pt ambulating. No other injuries.   Nurse aid 5 hours a day, pt lives alone   Pt sent from Dr. Page office for CT head. Pt did not take pain meds today.   well appearing, chronic slurred speech on exam, no other neurologically focal deficits, clear lungs   ct head reviewed, tylenol given.   pt stable for dc with head injury instructions and stable for dc and fu with Dr. Page.

## 2022-02-10 NOTE — ED PROVIDER NOTE - PATIENT PORTAL LINK FT
You can access the FollowMyHealth Patient Portal offered by [facility] by registering at the following website: [email or web address]. By joining [facility]’s FollowMyHealth portal, you will also be able to view your health information using other applications (apps) compatible with our system.

## 2022-02-10 NOTE — ED ADULT NURSE NOTE - NSIMPLEMENTINTERV_GEN_ALL_ED
Implemented All Fall with Harm Risk Interventions:  [place] to call system. Call bell, personal items and telephone within reach. Instruct patient to call for assistance. Room bathroom lighting operational. Non-slip footwear when patient is off stretcher. Physically safe environment: no spills, clutter or unnecessary equipment. Stretcher in lowest position, wheels locked, appropriate side rails in place. Provide visual cue, wrist band, yellow gown, etc. Monitor gait and stability. Monitor for mental status changes and reorient to person, place, and time. Review medications for side effects contributing to fall risk. Reinforce activity limits and safety measures with patient and family. Provide visual clues: red socks.

## 2022-02-10 NOTE — ED PROVIDER NOTE - NS ED MD DISPO DISCHARGE
History of Present Illness


General


Chief Complaint:  Pregnancy Complications


Source:  Patient





Present Illness


HPI


Patient presents with spotting and also lower back pain.  Spotting started 

yesterday.  The lower back pain started earlier today.  She rates the pain 5/10 

and aching.  She denies any dysuria, fevers.  She's had some morning cystitis 

but no vomiting.  Her last period was .





She is certain her blood type is B positive.





No fevers, chills, chest pain, palpitations, nausea, vomiting, diarrhea, dysuria

, abdominal pain, shortness of breath, depression, visual changes, headache.





She has a bump in her scalp.


Allergies:  


Coded Allergies:  


     No Known Allergies (Unverified , 19)





Patient History


Past Medical History:  see triage record


Social History Narrative


raising children - recently moved from [place]


Last Menstrual Period:  19


Pregnant Now:  Yes


:  3


Para:  2


Reviewed Nursing Documentation:  PMH: Agreed; PSxH: Agreed





Nursing Documentation-PMH


Past Medical History:  No Stated History





Review of Systems


All Other Systems:  negative except mentioned in HPI





Physical Exam





Vital Signs








  Date Time  Temp Pulse Resp B/P (MAP) Pulse Ox O2 Delivery O2 Flow Rate FiO2


 


19 08:20 98.4 69 18 108/75 96 Room Air  








Sp02 EP Interpretation:  reviewed, normal


General Appearance:  well appearing, no apparent distress, GCS 15


Head:  normocephalic


Eyes:  bilateral eye normal inspection, bilateral eye PERRL


ENT:  moist mucus membranes


Neck:  supple


Respiratory:  lungs clear, normal breath sounds


Cardiovascular #1:  regular rate, rhythm


Cardiovascular #2:  2+ radial (R)


Gastrointestinal:  normal inspection, normal bowel sounds, non tender, no mass, 

non-distended


Genitourinary:  no CVA tenderness, deferred - for ultrasound


Musculoskeletal:  back normal, gait/station normal, normal range of motion


Neurologic:  alert, oriented x3, grossly normal


Psychiatric:  mood/affect normal


Skin:  normal inspection, warm/dry, other - tiny skin nodule r occipital area





Medical Decision Making


Diagnostic Impression:  


 Primary Impression:  


 Threatened miscarriage in early pregnancy


ER Course


Patient presents with spotting and back pain and believe she is 5 weeks 

pregnant.  Differential includes ectopic, UTI, pyelonephritis, threatened 

miscarriage, early pregnancy amongst others.  Evaluation will be with labs 

including quant and ultrasound.  Patient will be given Tylenol.





U/S - no gestational sac identified





Labs unremarkable.  Quant = [id number]





Patient eloped after ultrasound.





Attempt to contact - number not in service.  Message left with contact - [phone] @ 10:15.





Patient returned after walking home and having lunch.  





She had refused Tylenol but was feeling better.





Discussed results and advised to follow up with Ob.  She said she had none and 

recently moved to area.  Advised to f/u at nearby clinics.  Given lab results.





Told to return if increased pain or bleeding.





Patient stable for outpatient observation and treatment.





Laboratory Tests








Test


  19


09:06


 


White Blood Count


  4.9 K/UL


(4.8-10.8)


 


Red Blood Count


  4.60 M/UL


(4.20-5.40)


 


Hemoglobin


  12.5 G/DL


(12.0-16.0)


 


Hematocrit


  38.5 %


(37.0-47.0)


 


Mean Corpuscular Volume 84 FL (80-99)  


 


Mean Corpuscular Hemoglobin


  27.1 PG


(27.0-31.0)


 


Mean Corpuscular Hemoglobin


Concent 32.4 G/DL


(32.0-36.0)


 


Red Cell Distribution Width


  13.2 %


(11.6-14.8)


 


Platelet Count


  267 K/UL


(150-450)


 


Mean Platelet Volume


  9.4 FL


(6.5-10.1)


 


Neutrophils (%) (Auto)


  60.6 %


(45.0-75.0)


 


Lymphocytes (%) (Auto)


  32.8 %


(20.0-45.0)


 


Monocytes (%) (Auto)


  5.4 %


(1.0-10.0)


 


Eosinophils (%) (Auto)


  0.5 %


(0.0-3.0)


 


Basophils (%) (Auto)


  0.7 %


(0.0-2.0)


 


Prothrombin Time


  11.0 SEC


(9.30-11.50)


 


Prothrombin Time INR 1.0 (0.9-1.1)  


 


PTT


  27 SEC (23-33)


 


 


Urine Color Pale yellow  


 


Urine Appearance


  Slightly


cloudy


 


Urine pH 6 (4.5-8.0)  


 


Urine Specific Gravity


  1.015


(1.005-1.035)


 


Urine Protein


  Negative


(NEGATIVE)


 


Urine Glucose (UA)


  Negative


(NEGATIVE)


 


Urine Ketones


  Negative


(NEGATIVE)


 


Urine Blood


  2+ (NEGATIVE)


H


 


Urine Nitrite


  Negative


(NEGATIVE)


 


Urine Bilirubin


  Negative


(NEGATIVE)


 


Urine Urobilinogen


  Normal MG/DL


(0.0-1.0)


 


Urine Leukocyte Esterase


  1+ (NEGATIVE)


H


 


Urine RBC


  2-4 /HPF (0 -


2)  H


 


Urine WBC


  2-4 /HPF (0 -


2)


 


Urine Squamous Epithelial


Cells Moderate /LPF


(NONE/OCC)  H


 


Urine Bacteria


  Few /HPF


(NONE)


 


Sodium Level


  137 MMOL/L


(136-145)


 


Potassium Level


  3.5 MMOL/L


(3.5-5.1)


 


Chloride Level


  103 MMOL/L


()


 


Carbon Dioxide Level


  26 MMOL/L


(21-32)


 


Anion Gap


  8 mmol/L


(5-15)


 


Blood Urea Nitrogen


  15 mg/dL


(7-18)


 


Creatinine


  0.8 MG/DL


(0.55-1.30)


 


Estimate Glomerular


Filtration Rate > 60 mL/min


(>60)


 


Glucose Level


  82 MG/DL


()


 


Calcium Level


  9.0 MG/DL


(8.5-10.1)


 


Total Bilirubin


  0.5 MG/DL


(0.2-1.0)


 


Aspartate Amino Transferase


(AST) 17 U/L (15-37)


 


 


Alanine Aminotransferase (ALT)


  31 U/L (12-78)


 


 


Alkaline Phosphatase


  64 U/L


()


 


Total Protein


  7.9 G/DL


(6.4-8.2)


 


Albumin


  3.6 G/DL


(3.4-5.0)


 


Globulin 4.3 g/dL  


 


Albumin/Globulin Ratio


  0.8 (1.0-2.7)


L


 


Human Chorionic Gonadotropin,


Quant 5973 mIU/mL


(1-6)  H








CT/MRI/US Diagnostic Results


CT/MRI/US Diagnostic Results :  


   Imaging Test Ordered:  pelvic u/s


   Impression


Impression: No definite intrauterine gestational sac demonstrated. Differential 

considerations include very early pregnancy, spontaneous , ectopic 

pregnancy.  Recommend correlation with serial beta hCGs and follow-up 

sonography is indicated


Incidental finding cervical nabothian cyst


Possible right paraovarian varicosities; correlate with any clinical findings 

suggestive of pelvic congestion syndrome


Status:  improved


Disposition:  HOME, SELF-CARE


Condition:  Improved


Scripts


Prenatal Vit #91/Fe Fum/Fa/Dha (PRENATAL + DHA COMBO PACK) 1 Each Combo..pkg


1 EACH PO DAILY, #30 PACK


   Prov: Eugene Zuniga MD         19 


Acetaminophen (Tylenol) 325 Mg Tablet


650 MG ORAL Q6H PRN for Prn Pain/Headache/Temp > 101, #20 TAB 0 Refills


   Prov: Eugene Zuniga MD         19











Eugene Zuniga MD 2019 08:48 Home

## 2022-02-10 NOTE — ED ADULT TRIAGE NOTE - CHIEF COMPLAINT QUOTE
patient BIBA complaining of HA s/p syncope and fall at MD office a few days ago. patient has h/o of CVA and was sent to ED by her MD for mild persistent head pain. YOSEPH neg

## 2022-02-10 NOTE — ED PROVIDER NOTE - CARE PLAN
1 Principal Discharge DX:	Headache  Secondary Diagnosis:	Closed head injury  Secondary Diagnosis:	Fall

## 2022-02-10 NOTE — ED PROVIDER NOTE - NSFOLLOWUPINSTRUCTIONS_ED_ALL_ED_FT
Lesión en la angel luis en los adultos    Head Injury, Adult       Hay muchos tipos de lesiones en la angel luis. Algunas pueden ser tan leves macario un chichón pequeño. Otras pueden ser más graves. Ejemplos de lesiones graves:  •Un golpe iraida en la angel luis que sacude el cerebro hacia elis y atrás (conmoción cerebral).      •Un moretón (contusión) en el cerebro. [place] significa que hay hemorragia en el cerebro que puede causar hinchazón.      •Fisura en el cráneo (fractura de cráneo).      •Hemorragia en el cerebro que se acumula, se espesa (se produce un coágulo) y forma un bulto (hematoma).      La mayoría de los problemas provocados por glen lesión en la angel luis ocurren taya las primeras 24 horas. Sin embargo, es posible que siga teniendo efectos secundarios entre 7 y 10 días después de la lesión. Es importante controlar drake afección para ashley si hay cambios. Es posible que deban observarlo en el departamento de emergencias o en el servicio de atención urgente, o puede ser necesario que se quede en el hospital.      ¿Cuáles son las causas?    Hay muchas causas posibles de glen lesión en la angel luis. Glen lesión en la angel luis puede tener estas causas:  •Un accidente automovilístico.      •Accidentes en bicicleta o motocicleta.      •Lesiones deportivas.      •Caídas.      •Ser golpeado por un objeto.        ¿Cuáles son los signos o síntomas?    Los síntomas de lesión en la angel luis incluyen un moretón, un chichón o un sangrado en el lugar de la lesión. Otros síntomas físicos pueden ser:  •Dolor de angel luis.      •Sensación de que va a vomitar (náuseas) o vomitar.      •Mareos.      •Visión borrosa o doble.      •Sentir incomodidad cerca de luces brillantes o ruidos benita.      •Temblores que no puede controlar (convulsiones).      •Cansancio.      •Dificultad para despertarse.      •Desmayos o pérdida de la conciencia.      Los síntomas mentales o emocionales pueden incluir los siguientes:  •Sentirse abrumado o malhumorado.      •Confusión y problemas de memoria.      •Tener problemas para prestar atención o concentrarse.      •Cambios en los hábitos de alimentación o en el sueño.      •Sentirse preocupado o nervioso (ansioso).      •Sentirse nika (deprimido).        ¿Cómo se trata?    El tratamiento de esta afección depende de la gravedad de la lesión y del tipo de lesión que sufrió. El objetivo principal es prevenir problemas y darle tiempo al cerebro para que se recupere.    Lesión de angel luis leve     Si usted sufre glne lesión de angel luis leve, es posible que lo envíen a casa, y el tratamiento puede incluir lo siguiente:  •Estar en observación. Un adulto responsable debe quedarse con usted taya 24 horas después de producida la lesión y controlarlo con frecuencia.      •[place] físico.      •[place] cerebral.      •Analgésicos.      Lesión de angel luis grave    Si tiene glen lesión de angel luis grave, el tratamiento puede incluir lo siguiente:  •Estar en observación cercana. [place] incluye permanecer en el hospital.    •Medicamentos para:  •Ayudar a aliviar el dolor.      •Evitar las convulsiones.      •Ayudar con la hinchazón del cerebro.        •Proteger las vías respiratorias y usar glen máquina que lo ayude a respirar (respirador).      •Tratamientos para observar y tratar la hinchazón dentro del cerebro.    •Cirugía de cerebro. Esta puede ser necesaria en los siguientes casos:  •Extraer glen acumulación de reji o coágulos de reji.      •Interrumpir el sangrado.      •Retirar glen parte del cráneo. [place] permite que el cerebro tenga lugar para hincharse.          Siga estas instrucciones en drake casa:    Actividad     •Matthew reposo.      •Evite las actividades difíciles o cansadoras.      •Asegúrese de dormir lo suficiente.    •Deje que el cerebro descanse. Para hacerlo, limite las actividades que requieran pensar mucho o prestar mucha atención, macario las siguientes:  •Mirar televisión.      •Jugar juegos de memoria y armar rompecabezas.      •Tareas para el hogar o trabajos relacionados con el empleo.      •Trabajar en la computadora, participar en redes sociales y enviar mensajes de texto.        •Evite las actividades que pudieran provocar otra lesión en la angel luis hasta que el médico lo autorice. Entre ellas, practicar deportes. Puede ser peligroso tener otra lesión en la angel luis antes de que se haya recuperado de la primera.      •Pregunte al médico cuándo puede regresar a las actividades normales sin riesgo, macario al trabajo o a la escuela. Pida al médico un plan detallado para volver a realizar las actividades habituales de manera progresiva.      •Consulte a drake médico cuándo puede conducir, andar en bicicleta o usar maquinaria pesada. No realice estas actividades si se siente mareado.        Estilo de albin      • No lyndsey alcohol hasta que el médico lo autorice.      • No consuma drogas.      •Si le resulta más difícil que lo habitual recordar las cosas, escríbalas.      •Trate de hacer glen cosa por vez si se distrae con facilidad.      •Consulte con familiares y amigos si debe harry decisiones importantes.      •Cuénteles a sara amigos, familiares, colegas de confianza y drake gerente en el trabajo sobre drake lesión, los síntomas y sara límites (restricciones). Pídales que observen si aparecen nuevos problemas o empeoran los existentes.      Instrucciones generales     •[place] los medicamentos de venta katy y los recetados solamente macario se lo haya indicado el médico.      •Pídale a alguien que lo acompañe taya 24 horas después de la lesión en la angel luis. Esta persona debe observar si hay cambios en los síntomas y estar preparada para obtener ayuda.      •Concurra a todas las visitas de seguimiento macario se lo haya indicado el médico. [place] es importante.      ¿Cómo se molly?     •Trabaje en drake equilibrio y drake fuerza. [place] puede ayudarlo a evitar caídas.      •Use el cinturón de seguridad cuando se encuentre en un vehículo en movimiento.    •Use un lucita cuando realice las siguientes actividades:  •Andar en bicicleta.      •Esquiar.      •Practicar algún otro deporte o actividad que representen un riesgo de lesión.      •Si magalis alcohol:•Limite la cantidad que magalis:  •De 0 a 1 medida por día para las mujeres que no estén embarazadas.      •De 0 a 2 medidas por día para los hombres.        •Esté atento a la cantidad de alcohol que hay en las bebidas que tenisha. En los [place], glen medida equivale a glen botella de cerveza de 12 oz (355 ml), un vaso de vino de 5 oz (148 ml) o un vaso de glen bebida alcohólica de winsome graduación de 1½ oz (44 ml).      •Matthew de drake hogar un lugar más seguro:  •Deshacerse de los obstáculos en pisos y escaleras. [place] incluye las cosas que pueden hacer que se tropiece.      •Coloque barras para sostén en los leroy y pasamanos en las escaleras.      •Ponga alfombras antideslizantes en pisos y bañeras.      •Mejore la iluminación de las zonas oscuras.          Dónde buscar más información    •Centers for Disease Control and Prevention (Centros para el Control y la Prevención de Enfermedades): www.cdc.gov        Solicite ayuda de inmediato si:  •Tiene lo siguiente:  •Dolor de angel luis muy iraida que no se calma con medicamentos.      •Dificultad para caminar o debilidad en los brazos o las piernas.      •Secreción transparente o con reji que proviene de la nariz o de los oídos.      •Cambios en la forma de ashley (visión).      •Glen convulsión.      •Más confusión o está más gruñón.        •Sara síntomas empeoran.      •Está más somnoliento de lo normal o tiene dificultad para mantenerse despierto.      •Pierde el equilibrio.      •La parte central tristan de los ojos (pupila) cambia de tamaño.      •Arrastra las palabras.      •Drake mareo empeora.      •Vomita.      Estos síntomas pueden indicar glen emergencia. No espere a ashley si los síntomas desaparecen. Solicite atención médica de inmediato. Comuníquese con el servicio de emergencias de drake localidad (911 en los [place]). No conduzca por sara propios medios hasta el hospital.       Resumen    •Las lesiones en la angel luis pueden ser tan leves macario un pequeño chichón. Otras pueden ser más graves.      •El tratamiento de esta afección depende de la gravedad de la lesión y del tipo de lesión que sufrió.      •Pídale a alguien que lo acompañe taya 24 horas después de la lesión en la angel luis.      •Pregunte al médico cuándo puede regresar a las actividades normales sin riesgo, macario al trabajo o a la escuela.      •Para evitar glen lesión en la angel luis, use cinturón de seguridad cuando se desplace en automóvil, use lucita cuando monte en bicicleta, limite el consumo de alcohol y matthew que drake hogar sea más seguro.      Esta información no tiene macario fin reemplazar el consejo del médico. Asegúrese de hacerle al médico cualquier pregunta que tenga.      Document Revised: 01/22/2021 Document Reviewed: 01/22/2021    Elsevier Patient Education © 2021 Elsevier Inc[employer]

## 2022-02-22 ENCOUNTER — OUTPATIENT (OUTPATIENT)
Dept: OUTPATIENT SERVICES | Facility: HOSPITAL | Age: 72
LOS: 1 days | End: 2022-02-22
Payer: MEDICARE

## 2022-02-22 ENCOUNTER — APPOINTMENT (OUTPATIENT)
Dept: MRI IMAGING | Facility: HOSPITAL | Age: 72
End: 2022-02-22
Payer: MEDICARE

## 2022-02-22 DIAGNOSIS — Z98.891 HISTORY OF UTERINE SCAR FROM PREVIOUS SURGERY: Chronic | ICD-10-CM

## 2022-02-22 DIAGNOSIS — Z95.5 PRESENCE OF CORONARY ANGIOPLASTY IMPLANT AND GRAFT: Chronic | ICD-10-CM

## 2022-02-22 DIAGNOSIS — Z00.8 ENCOUNTER FOR OTHER GENERAL EXAMINATION: ICD-10-CM

## 2022-02-22 PROCEDURE — 70551 MRI BRAIN STEM W/O DYE: CPT | Mod: 26

## 2022-02-22 PROCEDURE — 70551 MRI BRAIN STEM W/O DYE: CPT

## 2022-02-22 NOTE — CHART NOTE - NSCHARTNOTEFT_GEN_A_CORE
GEN attempted to schedule new patient appt with Dr Aguilera but office is closed on Wednesday.  GEN will try again during normal business hours.
GEN referred Pt for new Neurology appointment with Dr. Aguilera at Pt and HHA request. GEN scheduled new appointment for March 22nd at 1:40pm. GEN spoke with JORGE ALBERTO Milligan and provided appointment date, time and location ([address] [place] [address] [address]). Chel understanding and agreeable to appointment date and itme.
GEN scheduled PCP follow-up with Dr. Page for Monday 2/14 at 1:30pm. GEN spoke with Pt's HHA Chel at [phone] and provided with appointment date of 2/14 at 1:30pm. Reana and Pt agreeable to appointment date and time and confirmed they will be attending.  SW informed Chel and Pt that local Neurology offices are not open this weekend for new Pt referrals. SW to call offices on Monday and will contact Pt and HHA with appointment date and time. Pt and HHA understanding and appreciate GEN assistance
Pt is a 72 y/o female presenting to  ED on 2/10 with complaints of a headache following a fall 2 weeks prior. SW met with Pt and HHA at bedside and introduced self and SW role. Pt's HHA provided Serbian translation. Pt declined pacific  services.   Pt lives at home alone with a HHA 5 hrs a day. Pt has a prior CVA hx with slurred speech at baseline. Pt sent by Dr. Page's office today for CT of head.   SW discussed SW services. Pt declined SW services.  PA recommending Pt follow-up with PCP and Neurology for persistent headaches. Pt accepting of SW scheduling appointments. SW to schedule PCP appointment during regular business hours and will call Chel AZUL at [phone].   GEN attempted to schedule a follow-up with prior Neurologist from 2014, Dr. Mina, but an appointment is not available until May. Pt requesting a referral for a new neurologist in the [place] area.   GEN to call [place] [facility] offices during regular business hours to schedule new Pt appointment and will call Chel to provide appointment.  Pt and HHA Reana agreeable to dc plan. SW provided with contact information and will follow-up once follow-up appointments scheduled.

## 2022-02-22 NOTE — CHART NOTE - NSCHARTNOTESELECT_GEN_ALL_CORE
Social Work/Event Note
POST DC/Event Note
Post Discharge note/Event Note
Post Discharge note/Event Note

## 2022-03-01 ENCOUNTER — APPOINTMENT (OUTPATIENT)
Dept: OBGYN | Facility: CLINIC | Age: 72
End: 2022-03-01

## 2022-03-15 ENCOUNTER — APPOINTMENT (OUTPATIENT)
Dept: OBGYN | Facility: CLINIC | Age: 72
End: 2022-03-15
Payer: MEDICARE

## 2022-03-15 VITALS
SYSTOLIC BLOOD PRESSURE: 116 MMHG | HEIGHT: 63 IN | BODY MASS INDEX: 31.36 KG/M2 | DIASTOLIC BLOOD PRESSURE: 74 MMHG | TEMPERATURE: 98 F | WEIGHT: 177 LBS | HEART RATE: 75 BPM | OXYGEN SATURATION: 98 %

## 2022-03-15 DIAGNOSIS — R10.2 PELVIC AND PERINEAL PAIN: ICD-10-CM

## 2022-03-15 DIAGNOSIS — Z87.42 PERSONAL HISTORY OF OTHER DISEASES OF THE FEMALE GENITAL TRACT: ICD-10-CM

## 2022-03-15 PROCEDURE — 99214 OFFICE O/P EST MOD 30 MIN: CPT

## 2022-03-15 NOTE — PLAN
[FreeTextEntry1] : \par \par fu TVUS\par \par Denies weightloss, night sweats, fevers\par \par I spent the time noted on the day of this patient encounter preparing for, providing and documenting the above E/M service and counseling and educate patient on differential, workup, disease course, and treatment/management. Education was provided to the patient during this encounter. All questions and concerns were answered and addressed in detail.\par

## 2022-03-21 LAB
A VAGINAE DNA VAG QL NAA+PROBE: NORMAL
BACTERIA UR CULT: NORMAL
BVAB2 DNA VAG QL NAA+PROBE: NORMAL
C KRUSEI DNA VAG QL NAA+PROBE: NEGATIVE
C TRACH RRNA SPEC QL NAA+PROBE: NEGATIVE
MEGA1 DNA VAG QL NAA+PROBE: NORMAL
N GONORRHOEA RRNA SPEC QL NAA+PROBE: NEGATIVE
T VAGINALIS RRNA SPEC QL NAA+PROBE: NEGATIVE

## 2022-03-22 ENCOUNTER — APPOINTMENT (OUTPATIENT)
Dept: NEUROLOGY | Facility: CLINIC | Age: 72
End: 2022-03-22

## 2022-03-23 ENCOUNTER — APPOINTMENT (OUTPATIENT)
Dept: ULTRASOUND IMAGING | Facility: HOSPITAL | Age: 72
End: 2022-03-23

## 2022-03-23 LAB
APPEARANCE: CLEAR
BACTERIA: NEGATIVE
BILIRUBIN URINE: NEGATIVE
BLOOD URINE: ABNORMAL
COLOR: NORMAL
GLUCOSE QUALITATIVE U: NEGATIVE
HYALINE CASTS: 1 /LPF
KETONES URINE: NEGATIVE
LEUKOCYTE ESTERASE URINE: NEGATIVE
MICROSCOPIC-UA: NORMAL
NITRITE URINE: NEGATIVE
PH URINE: 6.5
PROTEIN URINE: NEGATIVE
RED BLOOD CELLS URINE: 1 /HPF
SPECIFIC GRAVITY URINE: 1.01
SQUAMOUS EPITHELIAL CELLS: 2 /HPF
UROBILINOGEN URINE: NORMAL
WHITE BLOOD CELLS URINE: 0 /HPF

## 2022-04-01 ENCOUNTER — APPOINTMENT (OUTPATIENT)
Dept: ULTRASOUND IMAGING | Facility: HOSPITAL | Age: 72
End: 2022-04-01
Payer: MEDICARE

## 2022-04-01 ENCOUNTER — OUTPATIENT (OUTPATIENT)
Dept: OUTPATIENT SERVICES | Facility: HOSPITAL | Age: 72
LOS: 1 days | End: 2022-04-01
Payer: MEDICARE

## 2022-04-01 DIAGNOSIS — Z98.891 HISTORY OF UTERINE SCAR FROM PREVIOUS SURGERY: Chronic | ICD-10-CM

## 2022-04-01 DIAGNOSIS — Z87.42 PERSONAL HISTORY OF OTHER DISEASES OF THE FEMALE GENITAL TRACT: ICD-10-CM

## 2022-04-01 DIAGNOSIS — Z95.5 PRESENCE OF CORONARY ANGIOPLASTY IMPLANT AND GRAFT: Chronic | ICD-10-CM

## 2022-04-01 PROCEDURE — 76830 TRANSVAGINAL US NON-OB: CPT

## 2022-04-01 PROCEDURE — 76830 TRANSVAGINAL US NON-OB: CPT | Mod: 26

## 2022-04-07 ENCOUNTER — APPOINTMENT (OUTPATIENT)
Dept: OBGYN | Facility: CLINIC | Age: 72
End: 2022-04-07
Payer: MEDICARE

## 2022-04-07 VITALS
SYSTOLIC BLOOD PRESSURE: 110 MMHG | DIASTOLIC BLOOD PRESSURE: 70 MMHG | TEMPERATURE: 97.9 F | BODY MASS INDEX: 31.54 KG/M2 | WEIGHT: 178 LBS | HEART RATE: 78 BPM | HEIGHT: 63 IN | OXYGEN SATURATION: 98 %

## 2022-04-07 PROCEDURE — 99214 OFFICE O/P EST MOD 30 MIN: CPT

## 2022-04-08 NOTE — HISTORY OF PRESENT ILLNESS
[FreeTextEntry1] : 71 yo with pelvic pain, trace fluid attenuation in endometrial canal, EE 4mm--nl, also with 3cm LO cyst. Given history of pelvic pain, offered EMB today.\par \par EMB attemtpted, not able to access cervical canal 2/2 to extreme stenosis. Patient reassured that EE is 4mm with trace fluid. Low suspicion for endometrial pathology.\par \par Patient to have tumor markers drawn, fu sono in 2 mo. If LO cyst still present, rec BSO and may attempt to sample endometrium at that time.

## 2022-04-08 NOTE — PROCEDURE
[Endometrial Biopsy] : Endometrial biopsy [Time out performed] : Pre-procedure time out performed.  Patient's name, date of birth and procedure confirmed. [Consent Obtained] : Consent obtained [Risks] : risks [Benefits] : benefits [Alternatives] : alternatives [Patient] : patient [Infection] : infection [Bleeding] : bleeding [Allergic Reaction] : allergic reaction [Uterine Perforation] : uterine perforation [Pain] : pain [Betadine] : Betadine [___ mL Injected] : [unfilled] ~UmL of lidocaine [0.01] : 0.01 [Tenaculum] : Tenaculum [No Complications] : No complications [de-identified] : trace fluid in EE, ee4mm, pelvic pain [de-identified] : extremely stenotic--attempt with 11 blade made at opening, failed

## 2022-04-20 ENCOUNTER — APPOINTMENT (OUTPATIENT)
Dept: UROLOGY | Facility: CLINIC | Age: 72
End: 2022-04-20
Payer: MEDICARE

## 2022-04-20 VITALS
HEIGHT: 63 IN | DIASTOLIC BLOOD PRESSURE: 76 MMHG | RESPIRATION RATE: 16 BRPM | HEART RATE: 71 BPM | OXYGEN SATURATION: 96 % | TEMPERATURE: 98.2 F | SYSTOLIC BLOOD PRESSURE: 131 MMHG

## 2022-04-20 DIAGNOSIS — Z84.1 FAMILY HISTORY OF DISORDERS OF KIDNEY AND URETER: ICD-10-CM

## 2022-04-20 DIAGNOSIS — Z63.4 DISAPPEARANCE AND DEATH OF FAMILY MEMBER: ICD-10-CM

## 2022-04-20 DIAGNOSIS — Z86.73 PERSONAL HISTORY OF TRANSIENT ISCHEMIC ATTACK (TIA), AND CEREBRAL INFARCTION W/OUT RESIDUAL DEFICITS: ICD-10-CM

## 2022-04-20 PROCEDURE — 99204 OFFICE O/P NEW MOD 45 MIN: CPT

## 2022-04-20 RX ORDER — GABAPENTIN 300 MG/1
300 CAPSULE ORAL
Refills: 0 | Status: DISCONTINUED | COMMUNITY
Start: 2020-12-02 | End: 2022-04-20

## 2022-04-20 RX ORDER — ESOMEPRAZOLE MAGNESIUM 10 MG/1
10 GRANULE, DELAYED RELEASE ORAL
Refills: 0 | Status: DISCONTINUED | COMMUNITY
Start: 2021-10-11 | End: 2022-04-20

## 2022-04-20 RX ORDER — PREDNISONE 5 MG/1
5 TABLET ORAL
Refills: 0 | Status: DISCONTINUED | COMMUNITY
Start: 2021-10-11 | End: 2022-04-20

## 2022-04-20 RX ORDER — LEVOCETIRIZINE DIHYDROCHLORIDE 5 MG/1
5 TABLET ORAL
Refills: 0 | Status: ACTIVE | COMMUNITY
Start: 2022-04-20

## 2022-04-20 RX ORDER — HYDROXYCHLOROQUINE SULFATE 300 MG/1
300 TABLET ORAL AS DIRECTED
Refills: 0 | Status: DISCONTINUED | COMMUNITY
Start: 2021-10-11 | End: 2022-04-20

## 2022-04-20 SDOH — SOCIAL STABILITY - SOCIAL INSECURITY: DISSAPEARANCE AND DEATH OF FAMILY MEMBER: Z63.4

## 2022-04-20 NOTE — PHYSICAL EXAM
[General Appearance - Well Developed] : well developed [General Appearance - Well Nourished] : well nourished [Normal Appearance] : normal appearance [Well Groomed] : well groomed [General Appearance - In No Acute Distress] : no acute distress [Edema] : no peripheral edema [Respiration, Rhythm And Depth] : normal respiratory rhythm and effort [Exaggerated Use Of Accessory Muscles For Inspiration] : no accessory muscle use [Abdomen Tenderness] : non-tender [Abdomen Soft] : soft [Costovertebral Angle Tenderness] : no ~M costovertebral angle tenderness [Normal Station and Gait] : the gait and station were normal for the patient's age [] : no rash [No Focal Deficits] : no focal deficits [Oriented To Time, Place, And Person] : oriented to person, place, and time [Affect] : the affect was normal [Mood] : the mood was normal [Not Anxious] : not anxious [No Palpable Adenopathy] : no palpable adenopathy

## 2022-04-20 NOTE — ASSESSMENT
[FreeTextEntry1] : patient with hx of small blood on recent urine \par no tobacco use or exposure\par hx of CVA 2012 with some speech impediment- here with aide \par no LUTS a bother\par recent pelvic sono ( april 2022) showed no fibroids\par not sexually active for many years ( ) \par avg water intake\par constipation \par no hx of renal stones or fam hx ( son had renal stone )\par urine march 2022 : small blood :1 RBC\par april creat : 0.56\par here for further eval :\par 1- check urine with micro \par 2- discussed that if micro again less than 3 red cells- no further eval needed at this time \par \par

## 2022-04-20 NOTE — REVIEW OF SYSTEMS
[Constipation] : constipation [Joint Pain] : joint pain [Anxiety] : anxiety [Depression] : depression [Feelings Of Weakness] : feelings of weakness [Negative] : Heme/Lymph [see HPI] : see HPI

## 2022-04-20 NOTE — HISTORY OF PRESENT ILLNESS
[FreeTextEntry1] : patient with hx of small blood on recent urine \par no tobacco use or exposure\par hx of CVA 2012 with some speech impediment- here with aide \par no LUTS a bother\par recent pelvic sono ( april 2022) showed no fibroids\par not sexually active for many years ( ) \par avg water intake\par constipation \par no hx of renal stones or fam hx ( son had renal stone )\par urine march 2022 : small blood :1 RBC\par april creat : 0.56\par here for further eval :

## 2022-04-22 LAB
APPEARANCE: CLEAR
BACTERIA UR CULT: NORMAL
BACTERIA: NEGATIVE
BILIRUBIN URINE: NEGATIVE
BLOOD URINE: NEGATIVE
COLOR: YELLOW
GLUCOSE QUALITATIVE U: NEGATIVE
HYALINE CASTS: 3 /LPF
KETONES URINE: NEGATIVE
LEUKOCYTE ESTERASE URINE: NEGATIVE
MICROSCOPIC-UA: NORMAL
NITRITE URINE: NEGATIVE
PH URINE: 8
PROTEIN URINE: NORMAL
RED BLOOD CELLS URINE: 2 /HPF
SPECIFIC GRAVITY URINE: 1.02
SQUAMOUS EPITHELIAL CELLS: 5 /HPF
URINE COMMENTS: NORMAL
UROBILINOGEN URINE: NORMAL
WHITE BLOOD CELLS URINE: 3 /HPF

## 2022-04-26 ENCOUNTER — APPOINTMENT (OUTPATIENT)
Dept: CARDIOLOGY | Facility: CLINIC | Age: 72
End: 2022-04-26

## 2022-05-11 ENCOUNTER — APPOINTMENT (OUTPATIENT)
Dept: CARDIOLOGY | Facility: CLINIC | Age: 72
End: 2022-05-11

## 2022-06-07 ENCOUNTER — APPOINTMENT (OUTPATIENT)
Dept: ULTRASOUND IMAGING | Facility: HOSPITAL | Age: 72
End: 2022-06-07

## 2022-06-09 ENCOUNTER — APPOINTMENT (OUTPATIENT)
Dept: NEUROLOGY | Facility: CLINIC | Age: 72
End: 2022-06-09
Payer: MEDICARE

## 2022-06-09 VITALS
HEIGHT: 63 IN | HEART RATE: 74 BPM | BODY MASS INDEX: 31.54 KG/M2 | SYSTOLIC BLOOD PRESSURE: 132 MMHG | DIASTOLIC BLOOD PRESSURE: 80 MMHG | WEIGHT: 178 LBS

## 2022-06-09 PROCEDURE — 99205 OFFICE O/P NEW HI 60 MIN: CPT

## 2022-06-09 NOTE — DISCUSSION/SUMMARY
[FreeTextEntry1] : 72-year-old woman who is here for initial consultation for memory loss that may be due to pseudodementia given her psychiatric history versus neurodegenerative disease such as Alzheimer's disease.  Will check MRI of the brain to evaluate for any patterns of atrophy in the temporal parietal region.  Will check for blood work and refer to cognitive specialist.\par \par I spent the time noted on the day of this patient encounter preparing for, providing and documenting the above E/M service and counseling and educate patient on differential, workup, disease course, and treatment/management. Education was provided to the patient during this encounter. All questions and concerns were answered and addressed in detail. The patient verbalized understanding and agreed to plan. Patient was advised to continue to monitor for neurologic symptoms and to notify my office or go to the nearest emergency room if there are any changes. Any orders/referrals and communications were provided as well. \par Side effects of the above medications were discussed in detail including but not limited to applicable black box warning and teratogenicity as appropriate. \par Patient was advised to bring previous records to my office. \par \par \par

## 2022-06-09 NOTE — REASON FOR VISIT
[Consultation] : a consultation visit [Formal Caregiver] : formal caregiver [[employer] ] : provided by [employer]   [FreeTextEntry1] : Memory loss difficult to perform MoCA exam difficult to perform MoCA exam [Interpreters_IDNumber] : [id number]

## 2022-06-09 NOTE — HISTORY OF PRESENT ILLNESS
[FreeTextEntry1] : 72-year-old woman who is here for followup of her tardive dyskinesia. The plan since her last visit including seeing Dr. Ochoa or Monika to see if there aren't any interventions that might help her with difficulty speaking due to the mouth movement she has been suffering for a couple of years. Patient was planned to see a psychiatrist and she has an appointment pending on Monday. She has not gotten a chance to see the movement specialist. She went to the emergency room at [place] instead. She was asked to followup with a psychiatrist. She didn't bring paperwork for her wrist to the ER. During the course of her visit in the office patient refused to do many things. When the  was able to hear her, even with the friends encouragement patient did not  the  to speak clearly to the . When asked if there is any social stressors her friend and the patient refused to comment. A major thing that happened is patient had an emergency back in her home country in [place]. They did not tell me what was the major stressor. Her mouth movements only occurred when she tries to speak. It is not consistent. There are no modifying factors. There are no other associated symptoms.\par \par interval history: she was last seen in 2014 for tardive dyskinesia. She was supposed to see movement specialists but never went. She saw Dr. Otero in 2019 and tardive dyskinesia was verified. She is here for evaluation of her memory loss. Multiple ways of asking for various examples of memory loss was unsuccessful in getting history from her.

## 2022-06-09 NOTE — PHYSICAL EXAM
[General Appearance - Alert] : alert [Oriented To Time, Place, And Person] : oriented to person, place, and time [Cranial Nerves Optic (II)] : visual acuity intact bilaterally,  visual fields full to confrontation, pupils equal round and reactive to light [Cranial Nerves Oculomotor (III)] : extraocular motion intact [Cranial Nerves Vestibulocochlear (VIII)] : hearing was intact bilaterally [Cranial Nerves Accessory (XI - Cranial And Spinal)] : head turning and shoulder shrug symmetric [Motor Tone] : muscle tone was normal in all four extremities [Motor Strength] : muscle strength was normal in all four extremities [Sensation Tactile Decrease] : light touch was intact [Abnormal Walk] : normal gait [1+] : Patella left 1+ [Coordination - Dysmetria Impaired Finger-to-Nose Bilateral] : not present [FreeTextEntry4] : difficult to perform moca exam. [FreeTextEntry5] : Tardive dyskinesia noted

## 2022-06-13 ENCOUNTER — LABORATORY RESULT (OUTPATIENT)
Age: 72
End: 2022-06-13

## 2022-06-13 LAB
CRP SERPL-MCNC: 10 MG/L
ERYTHROCYTE [SEDIMENTATION RATE] IN BLOOD BY WESTERGREN METHOD: 62 MM/HR
FOLATE SERPL-MCNC: 12.8 NG/ML
T4 SERPL-MCNC: 7.6 UG/DL
TSH SERPL-ACNC: 0.46 UIU/ML
VIT B12 SERPL-MCNC: >2000 PG/ML

## 2022-06-15 LAB
T PALLIDUM AB SER QL IA: POSITIVE
THYROGLOB AB SERPL-ACNC: <20 IU/ML
THYROPEROXIDASE AB SERPL IA-ACNC: 14.5 IU/ML

## 2022-06-16 LAB
ANA PAT FLD IF-IMP: NORMAL
ANA SER IF-ACNC: ABNORMAL

## 2022-06-20 LAB — VIT B1 SERPL-MCNC: 132 NMOL/L

## 2022-06-21 LAB
AMPA-R ABCBA: NEGATIVE
AMPHIPHYSIN IGG TITR SER IF: NEGATIVE TITER
CASPR2-IGG CBA, S: NEGATIVE
CV2 IGG TITR SER: NEGATIVE TITER
GABA-B ABCBA: NEGATIVE
GAD65 AB SER-MCNC: 0 NMOL/L
GLIAL NUC TYPE 1 AB TITR SER: NEGATIVE TITER
HU1 AB TITR SER: NEGATIVE TITER
HU2 AB TITR SER IF: NEGATIVE TITER
HU3 AB TITR SER: NEGATIVE TITER
IGLON5 IFA, S: NEGATIVE
IMMUNOLOGIST REVIEW: NORMAL
LGI1-IGG CBA, S: NEGATIVE
NIF IFA, S: NEGATIVE
NMDA-R ABCBA: NEGATIVE
PCA-1 AB TITR SER: NEGATIVE TITER
PCA-2 AB TITR SER: NEGATIVE TITER
PCA-TR AB TITR SER: NEGATIVE TITER
REFLEX ADDED: NORMAL

## 2022-06-23 ENCOUNTER — OUTPATIENT (OUTPATIENT)
Dept: OUTPATIENT SERVICES | Facility: HOSPITAL | Age: 72
LOS: 1 days | End: 2022-06-23
Payer: MEDICARE

## 2022-06-23 ENCOUNTER — APPOINTMENT (OUTPATIENT)
Dept: MRI IMAGING | Facility: HOSPITAL | Age: 72
End: 2022-06-23
Payer: MEDICARE

## 2022-06-23 DIAGNOSIS — Z95.5 PRESENCE OF CORONARY ANGIOPLASTY IMPLANT AND GRAFT: Chronic | ICD-10-CM

## 2022-06-23 DIAGNOSIS — Z98.891 HISTORY OF UTERINE SCAR FROM PREVIOUS SURGERY: Chronic | ICD-10-CM

## 2022-06-23 DIAGNOSIS — R41.3 OTHER AMNESIA: ICD-10-CM

## 2022-06-23 PROCEDURE — 70551 MRI BRAIN STEM W/O DYE: CPT | Mod: 26

## 2022-06-23 PROCEDURE — 70551 MRI BRAIN STEM W/O DYE: CPT

## 2022-06-30 ENCOUNTER — APPOINTMENT (OUTPATIENT)
Dept: RADIOLOGY | Facility: HOSPITAL | Age: 72
End: 2022-06-30

## 2022-06-30 ENCOUNTER — OUTPATIENT (OUTPATIENT)
Dept: OUTPATIENT SERVICES | Facility: HOSPITAL | Age: 72
LOS: 1 days | End: 2022-06-30
Payer: MEDICARE

## 2022-06-30 DIAGNOSIS — Z95.5 PRESENCE OF CORONARY ANGIOPLASTY IMPLANT AND GRAFT: Chronic | ICD-10-CM

## 2022-06-30 DIAGNOSIS — Z98.891 HISTORY OF UTERINE SCAR FROM PREVIOUS SURGERY: Chronic | ICD-10-CM

## 2022-06-30 DIAGNOSIS — M17.12 UNILATERAL PRIMARY OSTEOARTHRITIS, LEFT KNEE: ICD-10-CM

## 2022-06-30 DIAGNOSIS — M17.11 UNILATERAL PRIMARY OSTEOARTHRITIS, RIGHT KNEE: ICD-10-CM

## 2022-06-30 PROCEDURE — 73565 X-RAY EXAM OF KNEES: CPT

## 2022-06-30 PROCEDURE — 73565 X-RAY EXAM OF KNEES: CPT | Mod: 26

## 2022-07-28 ENCOUNTER — NON-APPOINTMENT (OUTPATIENT)
Age: 72
End: 2022-07-28

## 2022-08-15 NOTE — PATIENT PROFILE ADULT - NSPROGENSOURCEINFO_GEN_A_NUR
patient Double O-Z Flap Text: The defect edges were debeveled with a #15 scalpel blade.  Given the location of the defect, shape of the defect and the proximity to free margins a Double O-Z flap was deemed most appropriate.  Using a sterile surgical marker, an appropriate transposition flap was drawn incorporating the defect and placing the expected incisions within the relaxed skin tension lines where possible. The area thus outlined was incised deep to adipose tissue with a #15 scalpel blade.  The skin margins were undermined to an appropriate distance in all directions utilizing iris scissors.

## 2022-09-27 ENCOUNTER — APPOINTMENT (OUTPATIENT)
Dept: OBGYN | Facility: CLINIC | Age: 72
End: 2022-09-27

## 2022-09-27 VITALS
DIASTOLIC BLOOD PRESSURE: 72 MMHG | HEART RATE: 63 BPM | SYSTOLIC BLOOD PRESSURE: 118 MMHG | OXYGEN SATURATION: 97 % | HEIGHT: 63 IN | WEIGHT: 167 LBS | TEMPERATURE: 97.9 F | BODY MASS INDEX: 29.59 KG/M2

## 2022-09-27 DIAGNOSIS — N83.209 UNSPECIFIED OVARIAN CYST, UNSPECIFIED SIDE: ICD-10-CM

## 2022-09-27 PROCEDURE — 99397 PER PM REEVAL EST PAT 65+ YR: CPT

## 2022-10-05 LAB
C TRACH RRNA SPEC QL NAA+PROBE: NOT DETECTED
CANDIDA VAG CYTO: NOT DETECTED
CYTOLOGY CVX/VAG DOC THIN PREP: ABNORMAL
G VAGINALIS+PREV SP MTYP VAG QL MICRO: NOT DETECTED
HPV HIGH+LOW RISK DNA PNL CVX: NOT DETECTED
N GONORRHOEA RRNA SPEC QL NAA+PROBE: NOT DETECTED
SOURCE AMPLIFICATION: NORMAL
T VAGINALIS VAG QL WET PREP: NOT DETECTED

## 2022-10-17 ENCOUNTER — APPOINTMENT (OUTPATIENT)
Dept: RADIOLOGY | Facility: HOSPITAL | Age: 72
End: 2022-10-17

## 2022-10-17 ENCOUNTER — OUTPATIENT (OUTPATIENT)
Dept: OUTPATIENT SERVICES | Facility: HOSPITAL | Age: 72
LOS: 1 days | End: 2022-10-17
Payer: MEDICARE

## 2022-10-17 DIAGNOSIS — Z98.891 HISTORY OF UTERINE SCAR FROM PREVIOUS SURGERY: Chronic | ICD-10-CM

## 2022-10-17 DIAGNOSIS — Z95.5 PRESENCE OF CORONARY ANGIOPLASTY IMPLANT AND GRAFT: Chronic | ICD-10-CM

## 2022-10-17 DIAGNOSIS — Z00.8 ENCOUNTER FOR OTHER GENERAL EXAMINATION: ICD-10-CM

## 2022-10-17 PROCEDURE — 71046 X-RAY EXAM CHEST 2 VIEWS: CPT

## 2022-10-17 PROCEDURE — 71046 X-RAY EXAM CHEST 2 VIEWS: CPT | Mod: 26

## 2022-11-02 ENCOUNTER — NON-APPOINTMENT (OUTPATIENT)
Age: 72
End: 2022-11-02

## 2022-11-03 ENCOUNTER — TRANSCRIPTION ENCOUNTER (OUTPATIENT)
Age: 72
End: 2022-11-03

## 2022-11-21 ENCOUNTER — APPOINTMENT (OUTPATIENT)
Age: 72
End: 2022-11-21

## 2022-11-22 ENCOUNTER — APPOINTMENT (OUTPATIENT)
Dept: ULTRASOUND IMAGING | Facility: HOSPITAL | Age: 72
End: 2022-11-22

## 2022-11-22 ENCOUNTER — APPOINTMENT (OUTPATIENT)
Dept: RADIOLOGY | Facility: HOSPITAL | Age: 72
End: 2022-11-22

## 2022-11-22 ENCOUNTER — APPOINTMENT (OUTPATIENT)
Dept: MAMMOGRAPHY | Facility: HOSPITAL | Age: 72
End: 2022-11-22

## 2022-12-30 NOTE — PLAN
[FreeTextEntry1] : markers, repeat tvus\par \par I spent the time noted on the day of this patient encounter preparing for, providing and documenting the above service. I have  counseled and educated the patient on the differential, workup, disease course, and treatment/management plan. Education was provided to the patient during this encounter. All questions and concerns were answered and addressed in detail.\par \par Leora Beatty MD\par

## 2023-01-30 ENCOUNTER — APPOINTMENT (OUTPATIENT)
Dept: RADIOLOGY | Facility: HOSPITAL | Age: 73
End: 2023-01-30

## 2023-02-15 ENCOUNTER — APPOINTMENT (OUTPATIENT)
Dept: CARDIOLOGY | Facility: CLINIC | Age: 73
End: 2023-02-15
Payer: MEDICARE

## 2023-02-15 ENCOUNTER — NON-APPOINTMENT (OUTPATIENT)
Age: 73
End: 2023-02-15

## 2023-02-15 VITALS — SYSTOLIC BLOOD PRESSURE: 130 MMHG | HEART RATE: 68 BPM | DIASTOLIC BLOOD PRESSURE: 68 MMHG

## 2023-02-15 VITALS
HEART RATE: 69 BPM | BODY MASS INDEX: 29.77 KG/M2 | TEMPERATURE: 96.5 F | RESPIRATION RATE: 18 BRPM | WEIGHT: 168 LBS | OXYGEN SATURATION: 95 % | HEIGHT: 63 IN

## 2023-02-15 DIAGNOSIS — R00.2 PALPITATIONS: ICD-10-CM

## 2023-02-15 PROCEDURE — 93246 EXT ECG>7D<15D RECORDING: CPT

## 2023-02-15 PROCEDURE — 99214 OFFICE O/P EST MOD 30 MIN: CPT | Mod: 25

## 2023-02-15 PROCEDURE — 93000 ELECTROCARDIOGRAM COMPLETE: CPT

## 2023-02-15 NOTE — ASSESSMENT
[FreeTextEntry1] : In summary, the patient is a 72-year-old woman with no significant obstructive coronary disease by cath in 2021 and normal LV function who presents with a new onset of palpitations on occasion she does not have them daily\par \par For now we will place a ZIO patch on her to assess for any possible arrhythmia

## 2023-02-15 NOTE — PHYSICAL EXAM
[General Appearance - Well Developed] : well developed [Normal Appearance] : normal appearance [Well Groomed] : well groomed [General Appearance - Well Nourished] : well nourished [No Deformities] : no deformities [General Appearance - In No Acute Distress] : no acute distress [Normal Oral Mucosa] : normal oral mucosa [No Oral Pallor] : no oral pallor [No Oral Cyanosis] : no oral cyanosis [Normal Jugular Venous A Waves Present] : normal jugular venous A waves present [Normal Jugular Venous V Waves Present] : normal jugular venous V waves present [No Jugular Venous Horan A Waves] : no jugular venous horan A waves [Respiration, Rhythm And Depth] : normal respiratory rhythm and effort [Exaggerated Use Of Accessory Muscles For Inspiration] : no accessory muscle use [Auscultation Breath Sounds / Voice Sounds] : lungs were clear to auscultation bilaterally [Abdomen Soft] : soft [Abdomen Tenderness] : non-tender [Abdomen Mass (___ Cm)] : no abdominal mass palpated [Abnormal Walk] : normal gait [Gait - Sufficient For Exercise Testing] : the gait was sufficient for exercise testing [Nail Clubbing] : no clubbing of the fingernails [Cyanosis, Localized] : no localized cyanosis [Petechial Hemorrhages (___cm)] : no petechial hemorrhages [Skin Color & Pigmentation] : normal skin color and pigmentation [No Venous Stasis] : no venous stasis [] : no rash [Skin Lesions] : no skin lesions [No Skin Ulcers] : no skin ulcer [No Xanthoma] : no  xanthoma was observed [Oriented To Time, Place, And Person] : oriented to person, place, and time [Affect] : the affect was normal [Mood] : the mood was normal [No Anxiety] : not feeling anxious [Normal Rate] : normal [Normal S1] : normal S1 [Normal S2] : normal S2 [S3] : no S3 [S4] : no S4 [No Murmur] : no murmurs heard [Right Carotid Bruit] : no bruit heard over the right carotid [Left Carotid Bruit] : no bruit heard over the left carotid [Right Femoral Bruit] : no bruit heard over the right femoral artery [Left Femoral Bruit] : no bruit heard over the left femoral artery [2+] : left 2+ [No Abnormalities] : the abdominal aorta was not enlarged and no bruit was heard [No Pitting Edema] : no pitting edema present

## 2023-03-08 DIAGNOSIS — I47.1 SUPRAVENTRICULAR TACHYCARDIA: ICD-10-CM

## 2023-03-08 PROCEDURE — 93248 EXT ECG>7D<15D REV&INTERPJ: CPT

## 2023-03-08 RX ORDER — ATENOLOL 25 MG/1
25 TABLET ORAL DAILY
Refills: 0 | Status: DISCONTINUED | COMMUNITY
Start: 2021-10-11 | End: 2023-03-08

## 2023-04-03 ENCOUNTER — APPOINTMENT (OUTPATIENT)
Dept: GASTROENTEROLOGY | Facility: CLINIC | Age: 73
End: 2023-04-03
Payer: MEDICARE

## 2023-04-03 VITALS
WEIGHT: 167 LBS | HEART RATE: 68 BPM | HEIGHT: 62 IN | TEMPERATURE: 97.9 F | SYSTOLIC BLOOD PRESSURE: 120 MMHG | BODY MASS INDEX: 30.73 KG/M2 | DIASTOLIC BLOOD PRESSURE: 77 MMHG | OXYGEN SATURATION: 95 %

## 2023-04-03 DIAGNOSIS — Z80.0 FAMILY HISTORY OF MALIGNANT NEOPLASM OF DIGESTIVE ORGANS: ICD-10-CM

## 2023-04-03 DIAGNOSIS — R97.0 ELEVATED CARCINOEMBRYONIC ANTIGEN [CEA]: ICD-10-CM

## 2023-04-03 PROCEDURE — 99203 OFFICE O/P NEW LOW 30 MIN: CPT

## 2023-04-03 NOTE — ASSESSMENT
[FreeTextEntry1] : Unclear why CEA was ordered as this is NOT indicated for routine screening purposes under any circumstances in the absence of an already established maliganncy or known personal history of malignancy. Patient does not have any apparent alarm symptoms at this time.\par \par Due to abnormal CEA, however, with reported family history of gastric cancer, will pursue EGD and colonoscopy.\par \par Explained the risks, benefits, indications, alternatives. The risks include but are not limited to bleeding, infection, perforation, reaction to anesthesia, or missed lesions. The patient verbalized understanding and wishes to proceed.\par \par Hold clopidogrel for 5 days before the procedure. Advised verbally and in writing. She agrees.\par \par Continue Nexium OTC.\par \par

## 2023-04-03 NOTE — HISTORY OF PRESENT ILLNESS
[FreeTextEntry1] : Pt presents today for consultation for abnormal CEA.\par Denies rectal bleeding, melena, n/v, weight loss.\par Admits to GERD.\par \par Pt reports colonoscopy and EGD with Dr. Christianson in 2017. Had follow up EGD in 2019.\par The EGD biopsies showed intestinal metaplasia at the GE junction.\par Colonoscopy with no polyps.\par \par Taking Nexium OTC.\par \par Taking clopidogrel for history of stent.

## 2023-04-03 NOTE — PHYSICAL EXAM
[Alert] : alert [Normal Voice/Communication] : normal voice/communication [Healthy Appearing] : healthy appearing [No Acute Distress] : no acute distress [Sclera] : the sclera and conjunctiva were normal [Hearing Threshold Finger Rub Not Lea] : hearing was normal [Normal Lips/Gums] : the lips and gums were normal [Oropharynx] : the oropharynx was normal [Normal Appearance] : the appearance of the neck was normal [No Neck Mass] : no neck mass was observed [No Respiratory Distress] : no respiratory distress [No Acc Muscle Use] : no accessory muscle use [Respiration, Rhythm And Depth] : normal respiratory rhythm and effort [Auscultation Breath Sounds / Voice Sounds] : lungs were clear to auscultation bilaterally [Heart Rate And Rhythm] : heart rate was normal and rhythm regular [Normal S1, S2] : normal S1 and S2 [Murmurs] : no murmurs [Bowel Sounds] : normal bowel sounds [Abdomen Tenderness] : non-tender [No Masses] : no abdominal mass palpated [Abdomen Soft] : soft [No CVA Tenderness] : no CVA  tenderness [Normal Color / Pigmentation] : normal skin color and pigmentation [] : no rash [Oriented To Time, Place, And Person] : oriented to person, place, and time [de-identified] : unusual oral movements

## 2023-04-03 NOTE — REVIEW OF SYSTEMS
[Cough] : cough [Anxiety] : anxiety [Depression] : depression [Muscle Weakness] : muscle weakness [Easy Bruising] : a tendency for easy bruising [Negative] : Neurological [de-identified] : arthritis muscle pain

## 2023-04-19 DIAGNOSIS — Z12.11 ENCOUNTER FOR SCREENING FOR MALIGNANT NEOPLASM OF COLON: ICD-10-CM

## 2023-04-19 DIAGNOSIS — Z12.12 ENCOUNTER FOR SCREENING FOR MALIGNANT NEOPLASM OF COLON: ICD-10-CM

## 2023-04-19 RX ORDER — POLYETHYLENE GLYCOL-3350 AND ELECTROLYTES WITH FLAVOR PACK 240; 5.84; 2.98; 6.72; 22.72 G/278.26G; G/278.26G; G/278.26G; G/278.26G; G/278.26G
240 POWDER, FOR SOLUTION ORAL
Qty: 1 | Refills: 0 | Status: ACTIVE | COMMUNITY
Start: 2023-04-19 | End: 1900-01-01

## 2023-04-27 ENCOUNTER — APPOINTMENT (OUTPATIENT)
Dept: RADIOLOGY | Facility: HOSPITAL | Age: 73
End: 2023-04-27
Payer: MEDICARE

## 2023-04-27 ENCOUNTER — OUTPATIENT (OUTPATIENT)
Dept: OUTPATIENT SERVICES | Facility: HOSPITAL | Age: 73
LOS: 1 days | End: 2023-04-27
Payer: MEDICARE

## 2023-04-27 DIAGNOSIS — Z98.891 HISTORY OF UTERINE SCAR FROM PREVIOUS SURGERY: Chronic | ICD-10-CM

## 2023-04-27 DIAGNOSIS — Z00.8 ENCOUNTER FOR OTHER GENERAL EXAMINATION: ICD-10-CM

## 2023-04-27 DIAGNOSIS — Z95.5 PRESENCE OF CORONARY ANGIOPLASTY IMPLANT AND GRAFT: Chronic | ICD-10-CM

## 2023-04-27 PROCEDURE — 71046 X-RAY EXAM CHEST 2 VIEWS: CPT | Mod: 26

## 2023-04-27 PROCEDURE — 71046 X-RAY EXAM CHEST 2 VIEWS: CPT

## 2023-04-28 ENCOUNTER — RESULT REVIEW (OUTPATIENT)
Age: 73
End: 2023-04-28

## 2023-04-28 ENCOUNTER — OUTPATIENT (OUTPATIENT)
Dept: OUTPATIENT SERVICES | Facility: HOSPITAL | Age: 73
LOS: 1 days | End: 2023-04-28
Payer: MEDICARE

## 2023-04-28 ENCOUNTER — APPOINTMENT (OUTPATIENT)
Dept: GASTROENTEROLOGY | Facility: HOSPITAL | Age: 73
End: 2023-04-28

## 2023-04-28 DIAGNOSIS — Z95.5 PRESENCE OF CORONARY ANGIOPLASTY IMPLANT AND GRAFT: Chronic | ICD-10-CM

## 2023-04-28 DIAGNOSIS — Z98.891 HISTORY OF UTERINE SCAR FROM PREVIOUS SURGERY: Chronic | ICD-10-CM

## 2023-04-28 DIAGNOSIS — Z80.0 FAMILY HISTORY OF MALIGNANT NEOPLASM OF DIGESTIVE ORGANS: ICD-10-CM

## 2023-04-28 DIAGNOSIS — R97.0 ELEVATED CARCINOEMBRYONIC ANTIGEN [CEA]: ICD-10-CM

## 2023-04-28 PROCEDURE — 43239 EGD BIOPSY SINGLE/MULTIPLE: CPT

## 2023-04-28 PROCEDURE — 99214 OFFICE O/P EST MOD 30 MIN: CPT

## 2023-04-28 PROCEDURE — 45378 DIAGNOSTIC COLONOSCOPY: CPT

## 2023-04-28 PROCEDURE — 88312 SPECIAL STAINS GROUP 1: CPT

## 2023-04-28 PROCEDURE — 88305 TISSUE EXAM BY PATHOLOGIST: CPT

## 2023-04-28 PROCEDURE — 88312 SPECIAL STAINS GROUP 1: CPT | Mod: 26

## 2023-04-28 PROCEDURE — 88305 TISSUE EXAM BY PATHOLOGIST: CPT | Mod: 26

## 2023-04-28 RX ORDER — SODIUM CHLORIDE 9 MG/ML
500 INJECTION INTRAMUSCULAR; INTRAVENOUS; SUBCUTANEOUS
Refills: 0 | Status: COMPLETED | OUTPATIENT
Start: 2023-04-28 | End: 2023-04-28

## 2023-04-28 RX ADMIN — SODIUM CHLORIDE 75 MILLILITER(S): 9 INJECTION INTRAMUSCULAR; INTRAVENOUS; SUBCUTANEOUS at 14:53

## 2023-04-28 NOTE — CONSULT NOTE ADULT - SUBJECTIVE AND OBJECTIVE BOX
CHIEF COMPLAINT:  Patient is a 73y old  Female who is seen in endo prior to procedures    HPI: Prior history of CAd/stent, hbp, elevated lipids. Had cath with no obstructive disease subsequently   Has no chest pain, dyspnea. Recently evaluated for palpitations in our office, pvc, short SVT noted on monitor   No chf history.  Was told to hold plavix prior to procedure. Is allergic to aspirin.      PMH:   Depression    Stroke    Hypertension    Rotator cuff tear    Acid reflux    Rheumatoid arthritis, seronegative, shoulder        PSH:   H/O  section    History of coronary artery stent placement        FAMILY HISTORY:  FAMILY HISTORY:  No pertinent family history in first degree relatives        SOCIAL HISTORY:  Smoking:  no  Alcohol:  Drugs:    ALLERGIES:  reported aspirin      Home Medications:  atenolol 25 mg oral tablet: 1 tab(s) orally once a day (10 Feb 2022 17:35)  citalopram 20 mg oral tablet: 1 tab(s) orally once a day (10 Feb 2022 17:35)  clonazePAM 1 mg oral tablet: 1 tab(s) orally once a day (10 Feb 2022 17:35)  gabapentin 100 mg oral capsule: 1 cap(s) orally once a day (at bedtime) (10 Feb 2022 17:35)  hydrOXYzine hydrochloride 50 mg oral tablet: 1 tab(s) orally once a day (at bedtime) (10 Feb 2022 17:35)  Lipitor 40 mg oral tablet: 1 tab(s) orally once a day (10 Feb 2022 17:35)  NexIUM 40 mg oral delayed release capsule: 1 cap(s) orally once a day (10 Feb 2022 17:35)  Plaquenil 200 mg oral tablet: 1 tab(s) orally once a day (10 Feb 2022 17:35)  predniSONE 5 mg oral tablet: 1 tab(s) orally once a day (10 Feb 2022 17:35)      MEDICATIONS:  sodium chloride 0.9%. 500 milliLiter(s) IV Continuous <Continuous>      REVIEW OF SYSTEMS:  CONSTITUTIONAL: No fever, weight loss, or fatigue  EYES: No eye pain, visual disturbances, or discharge  ENMT:  No difficulty hearing, tinnitus, vertigo; No sinus or throat pain  NECK: No pain or stiffness  BREASTS: No pain, masses, or nipple discharge  RESPIRATORY: No cough, wheezing, chills or hemoptysis; No shortness of breath  CARDIOVASCULAR: No chest pain, palpitations, dizziness, or leg swelling  GASTROINTESTINAL: No abdominal or epigastric pain. No nausea, vomiting, or hematemesis; No diarrhea or constipation. No melena or hematochezia.  GENITOURINARY: No dysuria, frequency, hematuria, or incontinence  NEUROLOGICAL: No headaches, memory loss, loss of strength, numbness, or tremors  SKIN: No itching, burning, rashes, or lesions   LYMPH NODES: No enlarged glands  ENDOCRINE: No heat or cold intolerance; No hair loss  MUSCULOSKELETAL: No joint pain or swelling; No muscle, back, or extremity pain  PSYCHIATRIC: No depression, anxiety, mood swings, or difficulty sleeping  HEME/LYMPH: No easy bruising, or bleeding gums  ALLERGY AND IMMUNOLOGIC: No hives or eczema    PHYSICAL EXAM:  T(C): --  HR: --78  BP: --137/79  RR: --16  SpO2: --  Wt(kg): --    GENERAL: NAD, well-groomed, well-developed  HEAD:  Atraumatic, Normocephalic  EYES: EOMI, conjunctiva and sclera clear  ENT: Moist mucous membranes,  NECK: Supple, No JVD, no bruits  CHEST/LUNG: Clear to ausculation and percussion bilaterally; No rales, rhonchi, wheezing, or rubs  HEART: Regular rate and rhythm; No murmurs, rubs, or gallops PMI non displaced.  ABDOMEN: Soft, Nontender, Nondistended; Bowel sounds present  EXTREMITIES:  2+ Peripheral Pulses, No clubbing, cyanosis, or edema  SKIN: No rashes or lesions  NERVOUS SYSTEM:  Alert      Cardiovascular Diagnostic Testing:  < from: Cardiac Catheterization (10.21.21 @ 10:17) >    Study Date:     10/21/2021   Name:           JESSE BLACK   :            1950   (71 years)   Gender:         female   MR#:            [id number]   [facility]#:           [id number]   Patient Class:  Outpatient     Cath Lab Report    Diagnostic Cardiologist:       Tonny Tanner MD   Fellow:                        Sakshi Mccall MD   Fellow:                        Richard Grady MD   Monitor:                       CHELO DormanT   Scrub:                         CHELO GreenT   Nurse:                         Armando Morales   Referring Physician:           Sagar Mccarthy MD     Procedures Performed   Procedures:               1.    Art Access - R radial artery     2.    Left Coronary Angio   3.    Right Coronary Angio     Indications:               CCS Class II     Diagnostic Conclusions:     Mild nonobstructive disease.  Medical therapy     Procedure Narrative:   The risks and alternatives of the procedures and conscious sedation  were explained to the patientand informed consent was  obtained. The patient was brought to the cath lab and placed on the  exam table.  Access   Right radial artery:   The puncture site was infiltrated with 1% Lidocaine. Vascular access  was obtained using modified seldinger technique.    Diagnostic Findings:     Coronary Angiography   The coronary circulation is right dominant.      LM   Left main artery: Angiography shows no disease.      LAD   Proximal left anterior descending: There is a 30 % stenosis. Mid left  anterior descending: There is a 30 % stenosis.    CX   Circumflex: Angiography shows no disease.      RCA   Distal right coronary artery: Angiography shows mild atherosclerosis.      Patient: JESSE BLACK         MRN: [id number]  Study Date: 10/21/106601:17 AM      Page 1 of 3          History and Risk Factors:   Hypertension:                              Yes   Dyslipidemia:                              Yes   Prior PCI:                                 Yes     X-Ray:   Diagnostic Flouro time:      2.1 min.                   Exam record  DAP:          2279.33 cGycm2  Interventional Flouro time:                             Exam fluoro  DAP:          353.75 cGycm2  Total Flouro Time:           2.1 min.                   Exam total  DAP:           2633.09  cGycm2    Exam Start Time:   10:17 AM   Exam End Time:     10:32 AM   Exam Duration:     15 min     Contrast:   Description                    Dose         Unit       Serial No.   Omnipaque                         36.000   mL     Medication:   Description                 Dose, Unit, Route, Time   midazolam  1 mG/mL          1.0, mg, IV, 10:24:25   Injectable (versed)   fentaNYL 0.05 mG/mL         25.0, mcg, IV, 10:24:27   Injectable  (Fentanyl)   heparin (porcine) 1,000     3000.0, units,IA, 10:25:39   Unit(s)/mL Additive (Heparin)   verapamil 2.5 mG/mL         2.5, mg, IA, 10:25:41   Injectable     Inventory:   Description                  Quantity     Peoplesoft#        Reference  No.    Serial No.      Lot No.       CDM  CATH PACK                 1.000        [id number]   [id number]  [id number]    030   HEPARIN SALINE 1000 USP    1.000        [id number]   -10  [id number]  Units/500 mL                            397   HEPARIN SALINE 1000 USP    1.000        [id number]   -66  [id number]  Units/500 mL                            397   MEDRAD SYRINGE             1.000        [id number]   150FTQ     797   .035 x 150 GUIDERIGHT      1.000        [id number]   [id number]  [id number]    594   OMNIPAQUE 350 150ML        1.000        [id number]   Y544     368   6FR PRELUDE KIT            1.000        [id number]  [id number]                                   [id number]    904   VASC-BAND REGULAR          1.000        [id number]   3524  [id number]    Patient: JESSE BLACK         MRN: [id number]  Study Date: 10/21/2021   10:17 AM      Page 2 of 3          880   DXTERITY 5FR ULTRA 4       1.000        [id number]   [id number]  [id number]    941   Hemodynamic Pressures:   Phase          Location           [mmHg]                [mmHg]  [mmHg]            HR  Phase 0       Ao                   s      123                d  59                m       84          66    Conscious sedation was administered and monitored by Cardiology  nursingstaff,  under my direct supervision when applicable.     Electronically signed by Tonny Tanner MD on 10/21/2021 at  10:40 AM    Patient: JESSE BLACK         MRN: [id number]  Study Date: 10/21/2021   10:17 AM      Page 3 of 3    < end of copied text >                              Telemetry:    IMAGING:

## 2023-04-28 NOTE — CONSULT NOTE ADULT - ASSESSMENT
Cardiologically stable for anesthesia , EGD and colonoscopy at low cardiac risk.  Please resume plavix post procedure, prior stenting.

## 2023-05-02 LAB — SURGICAL PATHOLOGY STUDY: SIGNIFICANT CHANGE UP

## 2023-05-05 ENCOUNTER — APPOINTMENT (OUTPATIENT)
Dept: RHEUMATOLOGY | Facility: CLINIC | Age: 73
End: 2023-05-05

## 2023-05-21 ENCOUNTER — NON-APPOINTMENT (OUTPATIENT)
Age: 73
End: 2023-05-21

## 2023-05-31 ENCOUNTER — OUTPATIENT (OUTPATIENT)
Dept: OUTPATIENT SERVICES | Facility: HOSPITAL | Age: 73
LOS: 1 days | End: 2023-05-31
Payer: MEDICARE

## 2023-05-31 ENCOUNTER — APPOINTMENT (OUTPATIENT)
Dept: ULTRASOUND IMAGING | Facility: HOSPITAL | Age: 73
End: 2023-05-31
Payer: MEDICARE

## 2023-05-31 DIAGNOSIS — Z98.891 HISTORY OF UTERINE SCAR FROM PREVIOUS SURGERY: Chronic | ICD-10-CM

## 2023-05-31 DIAGNOSIS — Z95.5 PRESENCE OF CORONARY ANGIOPLASTY IMPLANT AND GRAFT: Chronic | ICD-10-CM

## 2023-05-31 DIAGNOSIS — Z00.8 ENCOUNTER FOR OTHER GENERAL EXAMINATION: ICD-10-CM

## 2023-05-31 PROCEDURE — 76775 US EXAM ABDO BACK WALL LIM: CPT | Mod: 26

## 2023-05-31 PROCEDURE — 76830 TRANSVAGINAL US NON-OB: CPT | Mod: 26

## 2023-05-31 PROCEDURE — 76856 US EXAM PELVIC COMPLETE: CPT | Mod: 26

## 2023-05-31 PROCEDURE — 76856 US EXAM PELVIC COMPLETE: CPT

## 2023-05-31 PROCEDURE — 76775 US EXAM ABDO BACK WALL LIM: CPT

## 2023-05-31 PROCEDURE — 76830 TRANSVAGINAL US NON-OB: CPT

## 2023-06-05 ENCOUNTER — NON-APPOINTMENT (OUTPATIENT)
Age: 73
End: 2023-06-05

## 2023-06-05 ENCOUNTER — RX RENEWAL (OUTPATIENT)
Age: 73
End: 2023-06-05

## 2023-06-19 ENCOUNTER — APPOINTMENT (OUTPATIENT)
Dept: MAMMOGRAPHY | Facility: HOSPITAL | Age: 73
End: 2023-06-19
Payer: MEDICARE

## 2023-06-19 ENCOUNTER — APPOINTMENT (OUTPATIENT)
Dept: ULTRASOUND IMAGING | Facility: HOSPITAL | Age: 73
End: 2023-06-19
Payer: MEDICARE

## 2023-06-19 ENCOUNTER — OUTPATIENT (OUTPATIENT)
Dept: OUTPATIENT SERVICES | Facility: HOSPITAL | Age: 73
LOS: 1 days | End: 2023-06-19
Payer: MEDICARE

## 2023-06-19 DIAGNOSIS — Z00.8 ENCOUNTER FOR OTHER GENERAL EXAMINATION: ICD-10-CM

## 2023-06-19 DIAGNOSIS — Z98.891 HISTORY OF UTERINE SCAR FROM PREVIOUS SURGERY: Chronic | ICD-10-CM

## 2023-06-19 DIAGNOSIS — Z95.5 PRESENCE OF CORONARY ANGIOPLASTY IMPLANT AND GRAFT: Chronic | ICD-10-CM

## 2023-06-19 PROCEDURE — 77063 BREAST TOMOSYNTHESIS BI: CPT | Mod: 26

## 2023-06-19 PROCEDURE — 76641 ULTRASOUND BREAST COMPLETE: CPT

## 2023-06-19 PROCEDURE — 76641 ULTRASOUND BREAST COMPLETE: CPT | Mod: 26,50

## 2023-06-19 PROCEDURE — 77067 SCR MAMMO BI INCL CAD: CPT

## 2023-06-19 PROCEDURE — 77063 BREAST TOMOSYNTHESIS BI: CPT

## 2023-06-19 PROCEDURE — 77067 SCR MAMMO BI INCL CAD: CPT | Mod: 26

## 2023-06-27 ENCOUNTER — APPOINTMENT (OUTPATIENT)
Dept: OBGYN | Facility: CLINIC | Age: 73
End: 2023-06-27
Payer: MEDICARE

## 2023-06-27 VITALS
WEIGHT: 167 LBS | OXYGEN SATURATION: 98 % | HEART RATE: 68 BPM | HEIGHT: 62 IN | DIASTOLIC BLOOD PRESSURE: 70 MMHG | TEMPERATURE: 97.6 F | BODY MASS INDEX: 30.73 KG/M2 | SYSTOLIC BLOOD PRESSURE: 136 MMHG

## 2023-06-27 DIAGNOSIS — N64.4 MASTODYNIA: ICD-10-CM

## 2023-06-27 PROCEDURE — 99212 OFFICE O/P EST SF 10 MIN: CPT

## 2023-06-27 NOTE — PLAN
[FreeTextEntry1] : \par \par I spent the time noted on the day of this patient encounter preparing for, providing and documenting the above service. I have  counseled and educated the patient on the differential, workup, disease course, and treatment/management plan. Education was provided to the patient during this encounter. All questions and concerns were answered and addressed in detail.\par \par Leora Beatty MD\par

## 2023-06-27 NOTE — PHYSICAL EXAM
[Appropriately responsive] : appropriately responsive [No Acute Distress] : no acute distress [Alert] : alert [No Lymphadenopathy] : no lymphadenopathy [Soft] : soft [Non-tender] : non-tender [Non-distended] : non-distended [No HSM] : No HSM [No Lesions] : no lesions [No Mass] : no mass [Oriented x3] : oriented x3 [Examination Of The Breasts] : a normal appearance [Normal] : normal [Tenderness Of The Right Breast] : tenderness [No Masses] : no breast masses were palpable

## 2023-06-27 NOTE — HISTORY OF PRESENT ILLNESS
[FreeTextEntry1] : chronic right breast pain. Recent mammo sono wnl, small stable breast cyst noted.\par \par Discussed caffeine use, considering changing bra to one without underwire, using tylenol/motrin/warm compresses. Breast surgery consult

## 2023-07-12 ENCOUNTER — RX RENEWAL (OUTPATIENT)
Age: 73
End: 2023-07-12

## 2023-07-28 NOTE — H&P PST ADULT - LANGUAGE ASSISTANCE NEEDED
Go for blood tests as directed. Your doctor will do lab tests at regular visits to monitor the effects of this medicine. Please follow up with your doctor and keep your health care provider appointments.
No-Patient/Caregiver offered and refused free interpretation services.

## 2023-09-07 ENCOUNTER — APPOINTMENT (OUTPATIENT)
Dept: NEUROLOGY | Facility: CLINIC | Age: 73
End: 2023-09-07
Payer: MEDICARE

## 2023-09-07 VITALS
WEIGHT: 158 LBS | DIASTOLIC BLOOD PRESSURE: 78 MMHG | BODY MASS INDEX: 29.08 KG/M2 | HEIGHT: 62 IN | SYSTOLIC BLOOD PRESSURE: 134 MMHG | HEART RATE: 76 BPM

## 2023-09-07 DIAGNOSIS — R41.3 OTHER AMNESIA: ICD-10-CM

## 2023-09-07 PROCEDURE — 99215 OFFICE O/P EST HI 40 MIN: CPT

## 2023-09-07 NOTE — DISCUSSION/SUMMARY
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  Patient will be again referred to cognitive specialist for further evaluation and I have reached out to the office of Dr. Page to inform him of her elevated ZOË, serum markers of inflammation and Treponema positivity.  The Treponema positivity is likely due to her previous history of syphilis however patient needs further evaluation whether she is adequately treated.  Copy of her blood work will be sent to Dr. Valenzuela's office as well.  Patient will follow-up with me as needed.  I spent the time noted on the day of this patient encounter preparing for, providing and documenting the above E/M service and counseling and educate patient on differential, workup, disease course, and treatment/management. Education was provided to the patient during this encounter. All questions and concerns were answered and addressed in detail. The patient verbalized understanding and agreed to plan. Patient was advised to continue to monitor for neurologic symptoms and to notify my office or go to the nearest emergency room if there are any changes. Any orders/referrals and communications were provided as well.  Side effects of the above medications were discussed in detail including but not limited to applicable black box warning and teratogenicity as appropriate.  Patient was advised to bring previous records to my office.

## 2023-09-07 NOTE — REASON FOR VISIT
[Follow-Up: _____] : a [unfilled] follow-up visit [Formal Caregiver] : formal caregiver [[employer] ] : provided by [employer]   [Interpreters_IDNumber] : [phone]

## 2023-09-07 NOTE — HISTORY OF PRESENT ILLNESS
[FreeTextEntry1] : 73-year-old woman who is here for followup of her tardive dyskinesia. The plan since her last visit including seeing Dr. Ochoa or Monika to see if there aren't any interventions that might help her with difficulty speaking due to the mouth movement she has been suffering for a couple of years. Patient was planned to see a psychiatrist and she has an appointment pending on Monday. She has not gotten a chance to see the movement specialist. She went to the emergency room at [place] instead. She was asked to followup with a psychiatrist. She didn't bring paperwork for her wrist to the ER. During the course of her visit in the office patient refused to do many things. When the  was able to hear her, even with the friends encouragement patient did not  the  to speak clearly to the . When asked if there is any social stressors her friend and the patient refused to comment. A major thing that happened is patient had an emergency back in her home country in [place]. They did not tell me what was the major stressor. Her mouth movements only occurred when she tries to speak. It is not consistent. There are no modifying factors. There are no other associated symptoms.  interval history: she was last seen in 2014 for tardive dyskinesia. She was supposed to see movement specialists but never went. She saw Dr. Otero in 2019 and tardive dyskinesia was verified. She is here for evaluation of her memory loss. Multiple ways of asking for various examples of memory loss was unsuccessful in getting history from her.   Interval history June 7, 2023: Patient is here for follow-up of her tardive dyskinesia.  Patient was advised to see the movement specialist but she never went.  Patient again asked about her tardive dyskinesia and she was reminded to see the movement specialist.  Patient MRI of the brain shows parenchymal volume loss and with the blood work for reversible causes of dementia showed ZOË in the 1:320, Treponema positivity, ESR 64 and slightly elevated CRP.  Patient states that she has a history of syphilis that was treated in the past.  Phone call was made to Dr. Page's office and I spoke with his  regarding the above abnormal findings.  Copy of the labs will be sent over to his office so that he can follow-up and further evaluate the elevated ZOË, markers of inflammation and Treponema positivity.

## 2023-09-21 ENCOUNTER — APPOINTMENT (OUTPATIENT)
Dept: PULMONOLOGY | Facility: CLINIC | Age: 73
End: 2023-09-21
Payer: MEDICARE

## 2023-09-21 VITALS
RESPIRATION RATE: 18 BRPM | SYSTOLIC BLOOD PRESSURE: 116 MMHG | HEIGHT: 62 IN | HEART RATE: 76 BPM | BODY MASS INDEX: 29.08 KG/M2 | DIASTOLIC BLOOD PRESSURE: 64 MMHG | OXYGEN SATURATION: 96 % | WEIGHT: 158 LBS

## 2023-09-21 DIAGNOSIS — J39.2 OTHER DISEASES OF PHARYNX: ICD-10-CM

## 2023-09-21 PROCEDURE — 99205 OFFICE O/P NEW HI 60 MIN: CPT

## 2023-10-05 ENCOUNTER — LABORATORY RESULT (OUTPATIENT)
Age: 73
End: 2023-10-05

## 2023-10-06 LAB — DEPRECATED D DIMER PPP IA-ACNC: <150 NG/ML DDU

## 2023-10-09 LAB
A ALTERNATA IGE QN: <0.1 KUA/L
A FUMIGATUS IGE QN: <0.1 KUA/L
BARLEY IGE QN: <0.1 KUA/L
C ALBICANS IGE QN: <0.1 KUA/L
C HERBARUM IGE QN: <0.1 KUA/L
CAT DANDER IGE QN: <0.1 KUA/L
CHERRY IGE QN: <0.1 KUA/L
COMMON RAGWEED IGE QN: <0.1 KUA/L
COW MILK IGE QN: <0.1 KUA/L
CRAB IGE QN: <0.1 KUA/L
D FARINAE IGE QN: <0.1 KUA/L
D PTERONYSS IGE QN: <0.1 KUA/L
DEPRECATED A ALTERNATA IGE RAST QL: 0
DEPRECATED A FUMIGATUS IGE RAST QL: 0
DEPRECATED BARLEY IGE RAST QL: 0
DEPRECATED C ALBICANS IGE RAST QL: 0
DEPRECATED C HERBARUM IGE RAST QL: 0
DEPRECATED CAT DANDER IGE RAST QL: 0
DEPRECATED CHERRY IGE RAST QL: 0
DEPRECATED COMMON RAGWEED IGE RAST QL: 0
DEPRECATED COW MILK IGE RAST QL: 0
DEPRECATED CRAB IGE RAST QL: 0
DEPRECATED D FARINAE IGE RAST QL: 0
DEPRECATED D PTERONYSS IGE RAST QL: 0
DEPRECATED DOG DANDER IGE RAST QL: NORMAL
DEPRECATED EGG WHITE IGE RAST QL: 0
DEPRECATED M RACEMOSUS IGE RAST QL: 0
DEPRECATED OAT IGE RAST QL: 0
DEPRECATED PEANUT IGE RAST QL: 0
DEPRECATED ROACH IGE RAST QL: 0
DEPRECATED RYE IGE RAST QL: 0
DEPRECATED SOYBEAN IGE RAST QL: 0
DEPRECATED TIMOTHY IGE RAST QL: 0
DEPRECATED WHEAT IGE RAST QL: 0
DEPRECATED WHITE OAK IGE RAST QL: 0
DOG DANDER IGE QN: 0.16 KUA/L
EGG WHITE IGE QN: <0.1 KUA/L
M RACEMOSUS IGE QN: <0.1 KUA/L
OAT IGE QN: <0.1 KUA/L
PEANUT IGE QN: <0.1 KUA/L
ROACH IGE QN: <0.1 KUA/L
RYE IGE QN: <0.1 KUA/L
SOYBEAN IGE QN: <0.1 KUA/L
TIMOTHY IGE QN: <0.1 KUA/L
TOTAL IGE SMQN RAST: 32 KU/L
WHEAT IGE QN: <0.1 KUA/L
WHITE OAK IGE QN: <0.1 KUA/L

## 2023-10-13 ENCOUNTER — RX CHANGE (OUTPATIENT)
Age: 73
End: 2023-10-13

## 2023-10-13 RX ORDER — BUDESONIDE 0.5 MG/2ML
0.5 INHALANT ORAL TWICE DAILY
Qty: 1 | Refills: 2 | Status: DISCONTINUED | COMMUNITY
Start: 2023-09-21 | End: 2023-10-13

## 2023-10-15 RX ORDER — BUDESONIDE 0.5 MG/2ML
0.5 INHALANT ORAL
Qty: 360 | Refills: 3 | Status: ACTIVE | COMMUNITY
Start: 1900-01-01 | End: 1900-01-01

## 2023-10-18 ENCOUNTER — OUTPATIENT (OUTPATIENT)
Dept: OUTPATIENT SERVICES | Facility: HOSPITAL | Age: 73
LOS: 1 days | End: 2023-10-18
Payer: COMMERCIAL

## 2023-10-18 DIAGNOSIS — Z95.5 PRESENCE OF CORONARY ANGIOPLASTY IMPLANT AND GRAFT: Chronic | ICD-10-CM

## 2023-10-18 DIAGNOSIS — G47.33 OBSTRUCTIVE SLEEP APNEA (ADULT) (PEDIATRIC): ICD-10-CM

## 2023-10-18 DIAGNOSIS — Z98.891 HISTORY OF UTERINE SCAR FROM PREVIOUS SURGERY: Chronic | ICD-10-CM

## 2023-10-18 PROCEDURE — 95800 SLP STDY UNATTENDED: CPT | Mod: 26

## 2023-10-18 PROCEDURE — 95800 SLP STDY UNATTENDED: CPT

## 2023-10-20 ENCOUNTER — APPOINTMENT (OUTPATIENT)
Dept: UROLOGY | Facility: CLINIC | Age: 73
End: 2023-10-20
Payer: MEDICARE

## 2023-10-20 VITALS
DIASTOLIC BLOOD PRESSURE: 78 MMHG | BODY MASS INDEX: 29.44 KG/M2 | SYSTOLIC BLOOD PRESSURE: 112 MMHG | WEIGHT: 160 LBS | OXYGEN SATURATION: 96 % | HEIGHT: 62 IN | HEART RATE: 90 BPM | TEMPERATURE: 98.1 F

## 2023-10-20 DIAGNOSIS — R31.9 HEMATURIA, UNSPECIFIED: ICD-10-CM

## 2023-10-20 PROCEDURE — 99213 OFFICE O/P EST LOW 20 MIN: CPT

## 2023-10-23 ENCOUNTER — APPOINTMENT (OUTPATIENT)
Dept: OBGYN | Facility: CLINIC | Age: 73
End: 2023-10-23
Payer: MEDICARE

## 2023-10-23 VITALS
OXYGEN SATURATION: 95 % | WEIGHT: 160 LBS | TEMPERATURE: 97.8 F | DIASTOLIC BLOOD PRESSURE: 60 MMHG | BODY MASS INDEX: 29.44 KG/M2 | HEIGHT: 62 IN | HEART RATE: 68 BPM | SYSTOLIC BLOOD PRESSURE: 128 MMHG

## 2023-10-23 DIAGNOSIS — N83.202 UNSPECIFIED OVARIAN CYST, LEFT SIDE: ICD-10-CM

## 2023-10-23 DIAGNOSIS — Z01.419 ENCOUNTER FOR GYNECOLOGICAL EXAMINATION (GENERAL) (ROUTINE) W/OUT ABNORMAL FINDINGS: ICD-10-CM

## 2023-10-23 LAB
APPEARANCE: CLEAR
BACTERIA: NEGATIVE /HPF
BILIRUBIN URINE: NEGATIVE
BLOOD URINE: ABNORMAL
CAST: 1 /LPF
COLOR: YELLOW
EPITHELIAL CELLS: 4 /HPF
GLUCOSE QUALITATIVE U: NEGATIVE MG/DL
KETONES URINE: NEGATIVE MG/DL
LEUKOCYTE ESTERASE URINE: NEGATIVE
MICROSCOPIC-UA: NORMAL
NITRITE URINE: NEGATIVE
PH URINE: 7
PROTEIN URINE: NEGATIVE MG/DL
RED BLOOD CELLS URINE: 10 /HPF
SPECIFIC GRAVITY URINE: 1.02
UROBILINOGEN URINE: 1 MG/DL
WHITE BLOOD CELLS URINE: 0 /HPF

## 2023-10-23 PROCEDURE — 99397 PER PM REEVAL EST PAT 65+ YR: CPT

## 2023-10-25 ENCOUNTER — APPOINTMENT (OUTPATIENT)
Dept: PULMONOLOGY | Facility: CLINIC | Age: 73
End: 2023-10-25
Payer: MEDICARE

## 2023-10-25 VITALS
SYSTOLIC BLOOD PRESSURE: 122 MMHG | WEIGHT: 160 LBS | DIASTOLIC BLOOD PRESSURE: 80 MMHG | TEMPERATURE: 97.1 F | HEIGHT: 62 IN | BODY MASS INDEX: 29.44 KG/M2 | OXYGEN SATURATION: 94 % | HEART RATE: 77 BPM

## 2023-10-25 LAB
C TRACH RRNA SPEC QL NAA+PROBE: NOT DETECTED
CANDIDA VAG CYTO: NOT DETECTED
G VAGINALIS+PREV SP MTYP VAG QL MICRO: NOT DETECTED
HPV HIGH+LOW RISK DNA PNL CVX: NOT DETECTED
N GONORRHOEA RRNA SPEC QL NAA+PROBE: NOT DETECTED
SOURCE AMPLIFICATION: NORMAL
T VAGINALIS VAG QL WET PREP: NOT DETECTED

## 2023-10-25 PROCEDURE — 99214 OFFICE O/P EST MOD 30 MIN: CPT

## 2023-10-30 ENCOUNTER — NON-APPOINTMENT (OUTPATIENT)
Age: 73
End: 2023-10-30

## 2023-11-02 LAB — CYTOLOGY CVX/VAG DOC THIN PREP: ABNORMAL

## 2023-11-22 ENCOUNTER — APPOINTMENT (OUTPATIENT)
Dept: PULMONOLOGY | Facility: CLINIC | Age: 73
End: 2023-11-22
Payer: MEDICARE

## 2023-11-22 VITALS
BODY MASS INDEX: 29.44 KG/M2 | WEIGHT: 160 LBS | TEMPERATURE: 97.1 F | HEIGHT: 62 IN | SYSTOLIC BLOOD PRESSURE: 120 MMHG | OXYGEN SATURATION: 93 % | DIASTOLIC BLOOD PRESSURE: 70 MMHG | HEART RATE: 66 BPM

## 2023-11-22 PROCEDURE — 99212 OFFICE O/P EST SF 10 MIN: CPT

## 2023-11-27 ENCOUNTER — RX RENEWAL (OUTPATIENT)
Age: 73
End: 2023-11-27

## 2023-11-29 ENCOUNTER — APPOINTMENT (OUTPATIENT)
Dept: INFECTIOUS DISEASE | Facility: CLINIC | Age: 73
End: 2023-11-29
Payer: MEDICARE

## 2023-11-29 VITALS
BODY MASS INDEX: 30 KG/M2 | TEMPERATURE: 97.8 F | OXYGEN SATURATION: 96 % | WEIGHT: 163 LBS | DIASTOLIC BLOOD PRESSURE: 81 MMHG | HEART RATE: 73 BPM | HEIGHT: 62 IN | SYSTOLIC BLOOD PRESSURE: 138 MMHG

## 2023-11-29 DIAGNOSIS — G24.4 IDIOPATHIC OROFACIAL DYSTONIA: ICD-10-CM

## 2023-11-29 DIAGNOSIS — R26.81 UNSTEADINESS ON FEET: ICD-10-CM

## 2023-11-29 PROCEDURE — 99205 OFFICE O/P NEW HI 60 MIN: CPT

## 2023-11-30 LAB
ALBUMIN SERPL ELPH-MCNC: 4.3 G/DL
ALP BLD-CCNC: 104 U/L
ALT SERPL-CCNC: 23 U/L
ANION GAP SERPL CALC-SCNC: 9 MMOL/L
APTT BLD: 33.6 SEC
AST SERPL-CCNC: 22 U/L
BASOPHILS # BLD AUTO: 0.03 K/UL
BASOPHILS NFR BLD AUTO: 0.4 %
BILIRUB SERPL-MCNC: 0.4 MG/DL
BUN SERPL-MCNC: 14 MG/DL
CALCIUM SERPL-MCNC: 10 MG/DL
CHLORIDE SERPL-SCNC: 99 MMOL/L
CO2 SERPL-SCNC: 26 MMOL/L
CREAT SERPL-MCNC: 0.69 MG/DL
EGFR: 93 ML/MIN/1.73M2
EOSINOPHIL # BLD AUTO: 0.14 K/UL
EOSINOPHIL NFR BLD AUTO: 1.9 %
HCT VFR BLD CALC: 42.9 %
HGB BLD-MCNC: 13.7 G/DL
IMM GRANULOCYTES NFR BLD AUTO: 0.3 %
INR PPP: 0.95 RATIO
LYMPHOCYTES # BLD AUTO: 2.8 K/UL
LYMPHOCYTES NFR BLD AUTO: 37.5 %
MAN DIFF?: NORMAL
MCHC RBC-ENTMCNC: 29.8 PG
MCHC RBC-ENTMCNC: 31.9 GM/DL
MCV RBC AUTO: 93.5 FL
MONOCYTES # BLD AUTO: 0.4 K/UL
MONOCYTES NFR BLD AUTO: 5.4 %
NEUTROPHILS # BLD AUTO: 4.08 K/UL
NEUTROPHILS NFR BLD AUTO: 54.5 %
PLATELET # BLD AUTO: 306 K/UL
POTASSIUM SERPL-SCNC: 4.1 MMOL/L
PROT SERPL-MCNC: 8.2 G/DL
PT BLD: 10.7 SEC
RBC # BLD: 4.59 M/UL
RBC # FLD: 14.6 %
SODIUM SERPL-SCNC: 134 MMOL/L
WBC # FLD AUTO: 7.47 K/UL

## 2023-12-01 ENCOUNTER — APPOINTMENT (OUTPATIENT)
Dept: UROLOGY | Facility: CLINIC | Age: 73
End: 2023-12-01
Payer: MEDICARE

## 2023-12-01 VITALS
OXYGEN SATURATION: 97 % | DIASTOLIC BLOOD PRESSURE: 75 MMHG | HEIGHT: 62 IN | SYSTOLIC BLOOD PRESSURE: 117 MMHG | BODY MASS INDEX: 30 KG/M2 | HEART RATE: 84 BPM | WEIGHT: 163 LBS | TEMPERATURE: 97.8 F

## 2023-12-01 PROCEDURE — 99213 OFFICE O/P EST LOW 20 MIN: CPT | Mod: 25

## 2023-12-01 PROCEDURE — 51798 US URINE CAPACITY MEASURE: CPT

## 2023-12-26 ENCOUNTER — APPOINTMENT (OUTPATIENT)
Dept: PULMONOLOGY | Facility: CLINIC | Age: 73
End: 2023-12-26
Payer: MEDICARE

## 2023-12-26 VITALS
WEIGHT: 163 LBS | DIASTOLIC BLOOD PRESSURE: 77 MMHG | BODY MASS INDEX: 30 KG/M2 | OXYGEN SATURATION: 98 % | HEART RATE: 74 BPM | TEMPERATURE: 96.9 F | HEIGHT: 62 IN | SYSTOLIC BLOOD PRESSURE: 120 MMHG

## 2023-12-26 PROCEDURE — 99214 OFFICE O/P EST MOD 30 MIN: CPT

## 2023-12-26 NOTE — ASSESSMENT
[FreeTextEntry1] : 74y/o female from [facility]  1- asthma - trigger infection allergies GERD  improved  3- CAD/ stent normal ECHO 3-  10/23  moderate  CHEYANNE  -    4- h/o stroke with early memory changes mouth movement at risk for aspiration 5- EGD_ 4/2023 grade A esophagitis compatible reflux esophagitis non- erosive gastritis 6- vaccinations covid ( covid x three ) flu  Recommendations 1-  autopap   4- 16 cm   water  tolerated -   compliance reviewed  -  encouraged to use  more often    2- budesonide q 12 3- albuterol neb q 8 hour prn - to try 1/2 dose due to fatigue  4- albuterol MDI prn    Ms Blandon  recieved her cpap  machine and -feels   more alert and  improved daytime   sleepiness -  she is benefiting from      autopap   treatment and   improved   memory and  respiratory status   improved sleep quality and less  sleepiness    compliance reviewed      2-  3months

## 2023-12-26 NOTE — REASON FOR VISIT
[Follow-Up] : a follow-up visit [Asthma] : asthma [Sleep Apnea] : sleep apnea [Formal Caregiver] : formal caregiver

## 2023-12-26 NOTE — REVIEW OF SYSTEMS
[SOB on Exertion] : sob on exertion [Diabetes] : diabetes [Obesity] : obesity [Fever] : no fever [Recent Wt Gain (___ Lbs)] : ~T no recent weight gain [Chills] : no chills [Recent Wt Loss (___ Lbs)] : ~T no recent weight loss [Nasal Congestion] : no nasal congestion [Postnasal Drip] : no postnasal drip [Cough] : no cough [Hemoptysis] : no hemoptysis [Sputum] : no sputum [Dyspnea] : no dyspnea [Wheezing] : no wheezing [Chest Discomfort] : no chest discomfort [Palpitations] : no palpitations [GERD] : no gerd [Abdominal Pain] : no abdominal pain [Nausea] : no nausea [Vomiting] : no vomiting [Dysphagia] : no dysphagia [Bleeding] : no bleeding [Blood Transfusion] : no blood transfusion [Clotting Disorder/ Frequent bleeding] : no clotting disorder/ frequent bleeding [Thyroid Problem] : no thyroid problem [TextBox_144] : pre-DM

## 2023-12-26 NOTE — PROCEDURE
[FreeTextEntry1] : PROCEDURE DATE: 04/27/2023    INTERPRETATION: Chest radiographs CPT 82708  CLINICAL INFORMATION: Ribs of breath of unknown severity x1 week.  TECHNIQUE: Frontal and lateral views of the chest were obtained.  FINDINGS: Prior study dated 10/17/2022 was available for review.  The lungs are clear. No pleural abnormality is seen.  The heart and mediastinum appear intact.   IMPRESSION: No evidence of active chest disease.  --- End of Report ---      MILAGRO ESPINOZA MD; Attending Radiologist This document has been electronically signed. Apr 27 2023 4:15PM

## 2023-12-26 NOTE — PHYSICAL EXAM
[IV] : Mallampati Class: IV [Normal Appearance] : normal appearance [Supple] : supple [No Neck Mass] : no neck mass [No JVD] : no jvd [Normal Rate/Rhythm] : normal rate/rhythm [Normal S1, S2] : normal s1, s2 [No Murmurs] : no murmurs [No Resp Distress] : no resp distress [No Acc Muscle Use] : no acc muscle use [Clear to Auscultation Bilaterally] : clear to auscultation bilaterally [Benign] : benign [Not Tender] : not tender [No Masses] : no masses [Soft] : soft [No Hernias] : no hernias [Normal Bowel Sounds] : normal bowel sounds [Normal Gait] : normal gait [No Clubbing] : no clubbing [No Edema] : no edema [No Rash] : no rash [No Motor Deficits] : no motor deficits [Normal Affect] : normal affect [TextBox_2] : pleasant fno distress speaking full sentences  no cough  [TextBox_11] : crowded no lesion  moist

## 2023-12-26 NOTE — HISTORY OF PRESENT ILLNESS
[Former] : former [Never] : never [Obstructive Sleep Apnea] : obstructive sleep apnea [Awakes Unrefreshed] : awakes unrefreshed [Awakes with Dry Mouth] : awakes with dry mouth [Awakes with Headache] : awakes with headache [Daytime Somnolence] : daytime somnolence [Difficulty Initiating Sleep] : difficulty initiating sleep [Fatigue] : fatigue [Snoring] : snoring [Home] : home [< 20 pack-years] : < 20 pack-years [TextBox_4] : 9/21/2023  Aid here   Ms Pastrana   74y/o female born in [facility]  ex smoker (   started 30's-  up to   x six year   one pack /week)   work hx  - HHA  in past  ? stroke 2012 with  speech  delay and memory changes - h/o  CAD with stent- recent cath 2021  normal LV function / normal   h/o CHEYANNE  needs new sleep machine          here sob  - 2-3   blocks   [facility]  recent  - dementia  followed by Neurologist  - EGD gastritis/ esophagitis  -  5/2023  -covid x  3   developed  asthma told  per aid   albuterol MDI   10/25/2023 74y/o female  ex smoker asthma    CHEYANNE - discussed sleep study and options    sleep hygeine - recieved nebulzier  at times feels tired with it howevermuch better now - no cough no sob -   11/22/2023 74y/o female   ex smoker asthma  CHEYANNE  -   doing  well on nebulizers - did not  get autopap yet  still waiting   12/26/2023  aid   translating  Amharic  74y/o  female ex smoker   asthma   CHEYANNE   f/u  - doin g well  with  mask  gets up to go to bathroom  -feels more refreshed more alert      less sleepiness -compliance reviewed with patient doing well  now   -    [TextBox_11] : 1/7 [TextBox_13] : 6 [APAP:] : APAP [Nasal mask] : nasal mask [TextBox_77] : 10 [TextBox_79] : 6 [TextBox_83] : 1 [TextBox_100] : 10/18/2023 [TextBox_112] : 0.7 [TextBox_108] : 26.5  [TextBox_116] : 65 [TextBox_125] : 4-16 cm water        median  5   max 9.5    [TextBox_152] : when used tolerates   5 hour 19 min  [TextBox_127] : 11/23 [TextBox_129] : 12/23 [TextBox_133] : 67% [TextBox_137] : 57% [TextBox_141] : 3 [TextBox_143] : 33 [TextBox_147] : 1.2 [TextBox_158] : community    [TextBox_160] : resmed  [TextBox_162] : 10/23 [ESS] : 10

## 2024-01-03 ENCOUNTER — APPOINTMENT (OUTPATIENT)
Dept: INFECTIOUS DISEASE | Facility: CLINIC | Age: 74
End: 2024-01-03

## 2024-01-03 VITALS
TEMPERATURE: 97.8 F | OXYGEN SATURATION: 96 % | HEART RATE: 74 BPM | HEIGHT: 62 IN | DIASTOLIC BLOOD PRESSURE: 70 MMHG | BODY MASS INDEX: 30 KG/M2 | WEIGHT: 163 LBS | SYSTOLIC BLOOD PRESSURE: 120 MMHG

## 2024-01-03 DIAGNOSIS — A53.9 SYPHILIS, UNSPECIFIED: ICD-10-CM

## 2024-01-04 LAB
ALBUMIN SERPL ELPH-MCNC: 4.3 G/DL
ALP BLD-CCNC: 85 U/L
ALT SERPL-CCNC: 18 U/L
ANION GAP SERPL CALC-SCNC: 13 MMOL/L
APTT BLD: 33.2 SEC
AST SERPL-CCNC: 21 U/L
BASOPHILS # BLD AUTO: 0.03 K/UL
BASOPHILS NFR BLD AUTO: 0.4 %
BILIRUB SERPL-MCNC: 0.5 MG/DL
BUN SERPL-MCNC: 15 MG/DL
CALCIUM SERPL-MCNC: 9.6 MG/DL
CHLORIDE SERPL-SCNC: 96 MMOL/L
CO2 SERPL-SCNC: 25 MMOL/L
CREAT SERPL-MCNC: 0.63 MG/DL
EGFR: 95 ML/MIN/1.73M2
EOSINOPHIL # BLD AUTO: 0.15 K/UL
EOSINOPHIL NFR BLD AUTO: 2.1 %
HCT VFR BLD CALC: 38.4 %
HGB BLD-MCNC: 12.6 G/DL
IMM GRANULOCYTES NFR BLD AUTO: 0.3 %
INR PPP: 0.98 RATIO
LYMPHOCYTES # BLD AUTO: 2.64 K/UL
LYMPHOCYTES NFR BLD AUTO: 36.5 %
MAN DIFF?: NORMAL
MCHC RBC-ENTMCNC: 30 PG
MCHC RBC-ENTMCNC: 32.8 GM/DL
MCV RBC AUTO: 91.4 FL
MONOCYTES # BLD AUTO: 0.42 K/UL
MONOCYTES NFR BLD AUTO: 5.8 %
NEUTROPHILS # BLD AUTO: 3.98 K/UL
NEUTROPHILS NFR BLD AUTO: 54.9 %
PLATELET # BLD AUTO: 283 K/UL
POTASSIUM SERPL-SCNC: 4 MMOL/L
PROT SERPL-MCNC: 7.4 G/DL
PT BLD: 11.2 SEC
RBC # BLD: 4.2 M/UL
RBC # FLD: 14.5 %
SODIUM SERPL-SCNC: 134 MMOL/L
WBC # FLD AUTO: 7.24 K/UL

## 2024-01-12 ENCOUNTER — APPOINTMENT (OUTPATIENT)
Dept: UROLOGY | Facility: CLINIC | Age: 74
End: 2024-01-12
Payer: MEDICARE

## 2024-01-12 VITALS
BODY MASS INDEX: 30 KG/M2 | DIASTOLIC BLOOD PRESSURE: 75 MMHG | SYSTOLIC BLOOD PRESSURE: 118 MMHG | OXYGEN SATURATION: 97 % | WEIGHT: 163 LBS | HEIGHT: 62 IN | HEART RATE: 75 BPM

## 2024-01-12 DIAGNOSIS — R39.15 URGENCY OF URINATION: ICD-10-CM

## 2024-01-12 DIAGNOSIS — L29.2 PRURITUS VULVAE: ICD-10-CM

## 2024-01-12 DIAGNOSIS — N32.81 OVERACTIVE BLADDER: ICD-10-CM

## 2024-01-12 DIAGNOSIS — R31.29 OTHER MICROSCOPIC HEMATURIA: ICD-10-CM

## 2024-01-12 DIAGNOSIS — R35.1 NOCTURIA: ICD-10-CM

## 2024-01-12 LAB
BILIRUB UR QL STRIP: NEGATIVE
CLARITY UR: CLEAR
COLLECTION METHOD: NORMAL
GLUCOSE UR-MCNC: NEGATIVE
HCG UR QL: 1 EU/DL
HGB UR QL STRIP.AUTO: NEGATIVE
KETONES UR-MCNC: NEGATIVE
LEUKOCYTE ESTERASE UR QL STRIP: NEGATIVE
NITRITE UR QL STRIP: NEGATIVE
PH UR STRIP: 7
PROT UR STRIP-MCNC: NEGATIVE
SP GR UR STRIP: 1.02

## 2024-01-12 PROCEDURE — 99213 OFFICE O/P EST LOW 20 MIN: CPT | Mod: 25

## 2024-01-12 PROCEDURE — 51798 US URINE CAPACITY MEASURE: CPT

## 2024-01-12 NOTE — HISTORY OF PRESENT ILLNESS
[FreeTextEntry1] : JESSE BLACK  73 year F presents for follow up was scheduled to follow up in 6 months but presented today in 6 weeks.  On Vesicare 5 mg at night. Helpful doing well. Some dry mouth at night. Continues to have bothersome nocturia. After double void  post void residual elevated. 88 ml.  Voiding diary not done  [Nocturia] : nocturia

## 2024-01-12 NOTE — ASSESSMENT
[FreeTextEntry1] : 74 y/o female with aid. On vesicare 5mg. Bothersome noctuira. voiding diary again requested. Trace hematuria Elevate PVR. History of syphillis. Recommend Cystoscopy to evaluate for pathlogy Cystoscopy. Procedure explained. Risks including bladder / urethral injury, dysuria, UTI, bleeding, passage of clots. Agrees to proceed.

## 2024-01-12 NOTE — PHYSICAL EXAM
[Normal Appearance] : normal appearance [Well Groomed] : well groomed [General Appearance - In No Acute Distress] : no acute distress [Edema] : no peripheral edema [Respiration, Rhythm And Depth] : normal respiratory rhythm and effort [Exaggerated Use Of Accessory Muscles For Inspiration] : no accessory muscle use [Abdomen Soft] : soft [Abdomen Tenderness] : non-tender [Costovertebral Angle Tenderness] : no ~M costovertebral angle tenderness [Urinary Bladder Findings] : the bladder was normal on palpation [Normal Station and Gait] : the gait and station were normal for the patient's age [] : no rash [No Focal Deficits] : no focal deficits [Oriented To Time, Place, And Person] : oriented to person, place, and time [Affect] : the affect was normal [Mood] : the mood was normal [No Palpable Adenopathy] : no palpable adenopathy [de-identified] : glistening vaginal mucos, Urethral caruncle reduced. No tenderness

## 2024-01-17 ENCOUNTER — APPOINTMENT (OUTPATIENT)
Dept: PULMONOLOGY | Facility: CLINIC | Age: 74
End: 2024-01-17
Payer: MEDICARE

## 2024-01-17 VITALS
OXYGEN SATURATION: 98 % | HEIGHT: 62 IN | HEART RATE: 74 BPM | TEMPERATURE: 97.6 F | SYSTOLIC BLOOD PRESSURE: 112 MMHG | DIASTOLIC BLOOD PRESSURE: 70 MMHG | WEIGHT: 163 LBS | BODY MASS INDEX: 30 KG/M2

## 2024-01-17 DIAGNOSIS — J45.901 UNSPECIFIED ASTHMA WITH (ACUTE) EXACERBATION: ICD-10-CM

## 2024-01-17 DIAGNOSIS — G47.33 OBSTRUCTIVE SLEEP APNEA (ADULT) (PEDIATRIC): ICD-10-CM

## 2024-01-17 PROCEDURE — 99213 OFFICE O/P EST LOW 20 MIN: CPT

## 2024-01-17 NOTE — PROCEDURE
[FreeTextEntry1] : PROCEDURE DATE: 04/27/2023    INTERPRETATION: Chest radiographs CPT 96471  CLINICAL INFORMATION: Ribs of breath of unknown severity x1 week.  TECHNIQUE: Frontal and lateral views of the chest were obtained.  FINDINGS: Prior study dated 10/17/2022 was available for review.  The lungs are clear. No pleural abnormality is seen.  The heart and mediastinum appear intact.   IMPRESSION: No evidence of active chest disease.  --- End of Report ---      MILAGRO ESPINOZA MD; Attending Radiologist This document has been electronically signed. Apr 27 2023 4:15PM

## 2024-01-17 NOTE — PHYSICAL EXAM
[Enlarged Base of the Tongue] : enlarged base of the tongue [IV] : Mallampati Class: IV [Normal Appearance] : normal appearance [Supple] : supple [No JVD] : no jvd [Normal Rate/Rhythm] : normal rate/rhythm [Normal S1, S2] : normal s1, s2 [No Murmurs] : no murmurs [No Resp Distress] : no resp distress [No Acc Muscle Use] : no acc muscle use [Clear to Auscultation Bilaterally] : clear to auscultation bilaterally [Benign] : benign [No Masses] : no masses [Soft] : soft [Normal Bowel Sounds] : normal bowel sounds [Normal Gait] : normal gait [No Clubbing] : no clubbing [No Edema] : no edema [No Rash] : no rash [No Motor Deficits] : no motor deficits [Normal Affect] : normal affect [TextBox_2] : pleasant f no distress speaking full sentences no cough  [TextBox_11] : crowded airway no lesion

## 2024-01-17 NOTE — HISTORY OF PRESENT ILLNESS
[Former] : former [< 20 pack-years] : < 20 pack-years [Never] : never [Obstructive Sleep Apnea] : obstructive sleep apnea [Awakes Unrefreshed] : awakes unrefreshed [Awakes with Dry Mouth] : awakes with dry mouth [Awakes with Headache] : awakes with headache [Daytime Somnolence] : daytime somnolence [Difficulty Initiating Sleep] : difficulty initiating sleep [Fatigue] : fatigue [Snoring] : snoring [Home] : home [APAP:] : APAP [Nasal mask] : nasal mask [TextBox_4] : 9/21/2023  Aid here   Ms Pastrana   72y/o female born in [facility]  ex smoker (   started 30's-  up to   x six year   one pack /week)   work hx  - HHA  in past  ? stroke 2012 with  speech  delay and memory changes - h/o  CAD with stent- recent cath 2021  normal LV function / normal   h/o CHEYANNE  needs new sleep machine          here sob  - 2-3   blocks   [facility]  recent  - dementia  followed by Neurologist  - EGD gastritis/ esophagitis  -  5/2023  -covid x  3   developed  asthma told  per aid   albuterol MDI   10/25/2023 72y/o female  ex smoker asthma    CHEYANNE - discussed sleep study and options    sleep hygeine - recieved nebulzier  at times feels tired with it howevermuch better now - no cough no sob -   11/22/2023 72y/o female   ex smoker asthma  CHEYANNE  -   doing  well on nebulizers - did not  get autopap yet  still waiting   12/26/2023  aid   translating  Syriac  72y/o  female ex smoker   asthma   CHEYANNE   f/u  - doin g well  with  mask  gets up to go to bathroom  -feels more refreshed more alert      less sleepiness -compliance reviewed with patient doing well  now   -  1/17/2024 72y/o female ex smoker  asthma   CHEYANNE  - when   cpap was  working felt much better - mask   is issue today reviewed use - no w -   [TextBox_11] : 1/7 [TextBox_13] : 6 [TextBox_77] : 10 [TextBox_79] : 6 [TextBox_83] : 1 [TextBox_100] : 10/18/2023 [TextBox_108] : 26.5  [TextBox_112] : 0.7 [TextBox_116] : 65 [TextBox_125] : 4-16 cm water        median  5   max 9.5    [TextBox_152] : when used tolerates   5 hour 19 min  [TextBox_127] : 11/23 [TextBox_129] : 12/23 [TextBox_133] : 67% [TextBox_137] : 57% [TextBox_141] : 3 [TextBox_143] : 33 [TextBox_147] : 1.2 [TextBox_158] : community    [TextBox_160] : resmed  [TextBox_162] : 10/23 [ESS] : 10

## 2024-01-18 ENCOUNTER — APPOINTMENT (OUTPATIENT)
Dept: UROLOGY | Facility: CLINIC | Age: 74
End: 2024-01-18

## 2024-01-26 ENCOUNTER — APPOINTMENT (OUTPATIENT)
Dept: UROLOGY | Facility: CLINIC | Age: 74
End: 2024-01-26
Payer: MEDICARE

## 2024-01-26 VITALS
HEIGHT: 62 IN | BODY MASS INDEX: 30 KG/M2 | WEIGHT: 163 LBS | DIASTOLIC BLOOD PRESSURE: 70 MMHG | HEART RATE: 75 BPM | SYSTOLIC BLOOD PRESSURE: 115 MMHG | OXYGEN SATURATION: 99 %

## 2024-01-26 DIAGNOSIS — Z87.891 PERSONAL HISTORY OF NICOTINE DEPENDENCE: ICD-10-CM

## 2024-01-26 DIAGNOSIS — R31.29 OTHER MICROSCOPIC HEMATURIA: ICD-10-CM

## 2024-01-26 LAB
BILIRUB UR QL STRIP: NEGATIVE
CLARITY UR: CLEAR
COLLECTION METHOD: NORMAL
GLUCOSE UR-MCNC: NEGATIVE
HCG UR QL: 0.2 EU/DL
HGB UR QL STRIP.AUTO: NORMAL
KETONES UR-MCNC: NEGATIVE
LEUKOCYTE ESTERASE UR QL STRIP: NEGATIVE
NITRITE UR QL STRIP: NEGATIVE
PH UR STRIP: 6.5
PROT UR STRIP-MCNC: NEGATIVE
SP GR UR STRIP: 1.01

## 2024-01-26 PROCEDURE — 99212 OFFICE O/P EST SF 10 MIN: CPT | Mod: 25

## 2024-01-26 PROCEDURE — 81003 URINALYSIS AUTO W/O SCOPE: CPT | Mod: QW

## 2024-01-26 PROCEDURE — 52000 CYSTOURETHROSCOPY: CPT

## 2024-02-12 ENCOUNTER — APPOINTMENT (OUTPATIENT)
Dept: FAMILY MEDICINE | Facility: CLINIC | Age: 74
End: 2024-02-12
Payer: MEDICARE

## 2024-02-12 VITALS
TEMPERATURE: 98.2 F | SYSTOLIC BLOOD PRESSURE: 122 MMHG | WEIGHT: 162 LBS | HEART RATE: 68 BPM | DIASTOLIC BLOOD PRESSURE: 75 MMHG | BODY MASS INDEX: 29.63 KG/M2 | RESPIRATION RATE: 16 BRPM | OXYGEN SATURATION: 98 %

## 2024-02-12 DIAGNOSIS — E78.5 HYPERLIPIDEMIA, UNSPECIFIED: ICD-10-CM

## 2024-02-12 DIAGNOSIS — G24.01 DRUG INDUCED SUBACUTE DYSKINESIA: ICD-10-CM

## 2024-02-12 DIAGNOSIS — R06.02 SHORTNESS OF BREATH: ICD-10-CM

## 2024-02-12 DIAGNOSIS — M79.672 PAIN IN LEFT FOOT: ICD-10-CM

## 2024-02-12 DIAGNOSIS — M13.0 POLYARTHRITIS, UNSPECIFIED: ICD-10-CM

## 2024-02-12 PROCEDURE — 99204 OFFICE O/P NEW MOD 45 MIN: CPT

## 2024-02-14 NOTE — PHYSICAL EXAM
[Normal] : affect was normal and insight and judgment were intact [de-identified] : wearing glasses [de-identified] : abnormal tongue movements noted with speech, no other focal deficits noted.

## 2024-02-14 NOTE — HISTORY OF PRESENT ILLNESS
[Formal Caregiver] : formal caregiver [Other: _____] : [unfilled] [FreeTextEntry8] : 72y/o female born in [facility] w/ history of stroke 2012 with residual speech abnormality and memory changes, syphilis s/p treatment (concern for neurosyphilis), history of former nicotine use, CAD with stent placement, CHEYANNE, pre-diabetes, hyperlipidemia presents to establish with new primary doctor. She presents with her home health aid Liseth Pastrana who is with her 5 hours per day.  History of syphilis - From Dr. Mina "While her neurologic symptoms are not classic for neurosyphilis, the presence of neurologic findings, a high ESR and an abnormal MRI - in the setting of such a heavy and prolonged exposure to syphilis - make me concerned enough to recommend an LP to r/o neurosyphilis despite the undetectable RPR which points a away from but does not rule out neurosyphilis." Evidence of neurodegenerative disease on MRI and longstanding tardive dyskinesia.  Shikha's primary concerns today include getting a script for bloodwork to check sugar and cholesterol. She would also like to see a rheumatologist for her arthritic fingers.   Home Health Aid  Liseth Pastrana   Specialists: Pulmonary- Dr. Finney Neuro-Dr. Mina Urology- Dr. Crook GYN-Dr. Beatty Cardiology- Dr. Mccarthy  Healthcare Maintenance  Pap smear -10/2023 Mammo - 06/2023 Colon ca screening - 4/2023 return 10 years  DEXA - ?

## 2024-02-14 NOTE — ASSESSMENT
[FreeTextEntry1] : Blood work requisition provided. Will follow up with results. Referral provided for rheumatology given joint findings and positive ZOË  Continue following with neurology  Return once bloodwork complete for CPE.

## 2024-02-16 ENCOUNTER — APPOINTMENT (OUTPATIENT)
Dept: UROLOGY | Facility: CLINIC | Age: 74
End: 2024-02-16

## 2024-02-21 ENCOUNTER — APPOINTMENT (OUTPATIENT)
Dept: NEUROLOGY | Facility: CLINIC | Age: 74
End: 2024-02-21
Payer: MEDICARE

## 2024-02-21 VITALS
WEIGHT: 160 LBS | HEART RATE: 64 BPM | DIASTOLIC BLOOD PRESSURE: 77 MMHG | HEIGHT: 62 IN | SYSTOLIC BLOOD PRESSURE: 123 MMHG | BODY MASS INDEX: 29.44 KG/M2

## 2024-02-21 DIAGNOSIS — G44.229 CHRONIC TENSION-TYPE HEADACHE, NOT INTRACTABLE: ICD-10-CM

## 2024-02-21 DIAGNOSIS — R41.3 OTHER AMNESIA: ICD-10-CM

## 2024-02-21 PROCEDURE — 99214 OFFICE O/P EST MOD 30 MIN: CPT

## 2024-02-21 PROCEDURE — 99204 OFFICE O/P NEW MOD 45 MIN: CPT
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
## 2024-02-21 NOTE — PHYSICAL EXAM
[FreeTextEntry1] : Physical Exam  Constitutional: Patient was well-developed, well-nourished and in no acute distress.   Head: Normocephalic, atraumatic.   Neck: Supple with full range of motion.   Cardiovascular: Cardiac rhythm was regular without murmur. There were no carotid bruits. Peripheral pulses were full and symmetric.   Respiratory: Lungs were clear.   Abdomen: Soft and nontender.   Spine: Nontender.   NEUROLOGICAL EXAMINATION:  Mental Status: Patient was alert and oriented. Speech was fluent. There was no dysarthria.  Affect was normal.  Cranial Nerves:   II: Pupils were equal and reactive. Visual fields were full.   III, IV, VI: Eye movements were full without nystagmus.   V: Facial sensation was intact.   VII: Facial strength was normal.   VIII: Hearing was equal.   IX, X: Palatal movement was normal. Phonation was normal.   XI: Sternocleidomastoids and trapezii were normal.   XII: Tongue was midline and movements normal. There was no lingual atrophy or fasciculations.   Motor Examination: Muscle bulk, tone and strength were normal.   Reflexes: R L  Biceps 1+ 1+  Patellar 1+ 1+  Achilles 1+ 1+  Gait/Stance: Gait and tandem were normal. Romberg was negative.

## 2024-02-21 NOTE — ASSESSMENT
[FreeTextEntry1] :  Plan:   {   } trial Aleve or Advil   {   } continue with Tylenol PRN  {   } referral to memory   {   } f/u as needed   Headache education provided: [] Stay well hydrated [] Limit excessive caffeine and alcohol intake [] Maintain good sleep hygiene [] Try to avoid triggers [] Practice good eating habits [] Avoid excessive use of over the counter pain medications, as they can cause medication overuse headaches  [] Keep a headache diary [] Relaxation techniques, biofeedback, massage therapy, acupunctures, and heating pads may be effective  The above plan was discussed with Shikha Blandon in great detail. Shikha Blandon verbalized understanding and agrees with plan as detailed above. Patient was provided education and counselling on current diagnosis/symptoms. She was advised to call our clinic at [phone] for any new or worsening symptoms, or with any questions or concerns. In case of acute onset of neurological symptoms or worsening presentation, patient was advised to present to nearest emergency room for further evaluation. Shikha Blandon expressed understanding and all her questions/concerns were addressed.

## 2024-02-25 ENCOUNTER — RX RENEWAL (OUTPATIENT)
Age: 74
End: 2024-02-25

## 2024-02-25 RX ORDER — METOPROLOL TARTRATE 25 MG/1
25 TABLET, FILM COATED ORAL
Qty: 20 | Refills: 0 | Status: ACTIVE | COMMUNITY
Start: 2023-03-08 | End: 1900-01-01

## 2024-02-28 ENCOUNTER — APPOINTMENT (OUTPATIENT)
Dept: PULMONOLOGY | Facility: CLINIC | Age: 74
End: 2024-02-28
Payer: MEDICARE

## 2024-02-28 VITALS
DIASTOLIC BLOOD PRESSURE: 70 MMHG | OXYGEN SATURATION: 97 % | HEART RATE: 71 BPM | TEMPERATURE: 97.1 F | SYSTOLIC BLOOD PRESSURE: 120 MMHG

## 2024-02-28 DIAGNOSIS — J45.909 UNSPECIFIED ASTHMA, UNCOMPLICATED: ICD-10-CM

## 2024-02-28 DIAGNOSIS — Z71.89 OTHER SPECIFIED COUNSELING: ICD-10-CM

## 2024-02-28 PROCEDURE — G2211 COMPLEX E/M VISIT ADD ON: CPT

## 2024-02-28 PROCEDURE — 99214 OFFICE O/P EST MOD 30 MIN: CPT

## 2024-02-29 ENCOUNTER — APPOINTMENT (OUTPATIENT)
Dept: FAMILY MEDICINE | Facility: CLINIC | Age: 74
End: 2024-02-29

## 2024-02-29 PROBLEM — Z71.89 CPAP USE COUNSELING: Status: ACTIVE | Noted: 2023-12-26

## 2024-02-29 PROBLEM — J45.909 MODERATE ASTHMA WITHOUT COMPLICATION, UNSPECIFIED WHETHER PERSISTENT: Status: ACTIVE | Noted: 2023-09-21

## 2024-02-29 NOTE — REVIEW OF SYSTEMS
[SOB on Exertion] : sob on exertion [Diabetes] : diabetes [Obesity] : obesity [Fever] : no fever [Recent Wt Gain (___ Lbs)] : ~T no recent weight gain [Chills] : no chills [Recent Wt Loss (___ Lbs)] : ~T no recent weight loss [Nasal Congestion] : no nasal congestion [Postnasal Drip] : no postnasal drip [Cough] : no cough [Hemoptysis] : no hemoptysis [Dyspnea] : no dyspnea [Sputum] : no sputum [Wheezing] : no wheezing [Palpitations] : no palpitations [Chest Discomfort] : no chest discomfort [Abdominal Pain] : no abdominal pain [GERD] : no gerd [Nausea] : no nausea [Vomiting] : no vomiting [Bleeding] : no bleeding [Dysphagia] : no dysphagia [Blood Transfusion] : no blood transfusion [Clotting Disorder/ Frequent bleeding] : no clotting disorder/ frequent bleeding [Thyroid Problem] : no thyroid problem [TextBox_144] : pre-DM

## 2024-02-29 NOTE — HISTORY OF PRESENT ILLNESS
[Former] : former [< 20 pack-years] : < 20 pack-years [Never] : never [Obstructive Sleep Apnea] : obstructive sleep apnea [Awakes Unrefreshed] : awakes unrefreshed [Awakes with Dry Mouth] : awakes with dry mouth [Awakes with Headache] : awakes with headache [Daytime Somnolence] : daytime somnolence [Difficulty Initiating Sleep] : difficulty initiating sleep [Fatigue] : fatigue [Snoring] : snoring [Home] : home [APAP:] : APAP [Nasal mask] : nasal mask [TextBox_4] : 9/21/2023  Aid here   Ms Pastrana   72y/o female born in [facility]  ex smoker (   started 30's-  up to   x six year   one pack /week)   work hx  - HHA  in past  ? stroke 2012 with  speech  delay and memory changes - h/o  CAD with stent- recent cath 2021  normal LV function / normal   h/o CHEYANNE  needs new sleep machine          here sob  - 2-3   blocks   [facility]  recent  - dementia  followed by Neurologist  - EGD gastritis/ esophagitis  -  5/2023  -covid x  3   developed  asthma told  per aid   albuterol MDI   10/25/2023 72y/o female  ex smoker asthma    CHEYANNE - discussed sleep study and options    sleep hygeine - recieved nebulzier  at times feels tired with it howevermuch better now - no cough no sob -   11/22/2023 72y/o female   ex smoker asthma  CHEYANNE  -   doing  well on nebulizers - did not  get autopap yet  still waiting   12/26/2023  aid   translating  Romanian  72y/o  female ex smoker   asthma   CHEYANNE   f/u  - doin g well  with  mask  gets up to go to bathroom  -feels more refreshed more alert      less sleepiness -compliance reviewed with patient doing well  now   -  1/17/2024 72y/o female ex smoker  asthma   CHEYANNE  - when   cpap was  working felt much better - mask   is issue today reviewed use - no w - 2/28/2024 72y/o  female ex smoker  asthma CHEYANNE =currently  tolerated  cpap  - no wheezing  -  [TextBox_11] : 1/7 [TextBox_13] : 6 [TextBox_77] : 10 [TextBox_79] : 6 [TextBox_83] : 1 [TextBox_100] : 10/18/2023 [TextBox_108] : 26.5  [TextBox_112] : 0.7 [TextBox_116] : 65 [TextBox_125] : 4-16 cm water        median  5   max 9.5    [TextBox_152] : max Pressure 10      air leak  varies  [TextBox_127] : 1/24 [TextBox_133] : 97% [TextBox_129] : 2/2024 [TextBox_137] : 20% [TextBox_141] : 2 [TextBox_143] : 47 [TextBox_158] : community    [TextBox_147] : 2.3  [TextBox_160] : resmed  [TextBox_162] : 10/23 [TextBox_165] : improved mask now    nasal     [ESS] : 10

## 2024-02-29 NOTE — PHYSICAL EXAM
[IV] : Mallampati Class: IV [Normal Appearance] : normal appearance [Supple] : supple [No Neck Mass] : no neck mass [No JVD] : no jvd [Normal Rate/Rhythm] : normal rate/rhythm [Normal S1, S2] : normal s1, s2 [No Murmurs] : no murmurs [No Acc Muscle Use] : no acc muscle use [Clear to Auscultation Bilaterally] : clear to auscultation bilaterally [No Resp Distress] : no resp distress [Benign] : benign [No Masses] : no masses [Normal Bowel Sounds] : normal bowel sounds [Soft] : soft [No Clubbing] : no clubbing [Normal Gait] : normal gait [No Rash] : no rash [No Edema] : no edema [Normal Affect] : normal affect [No Motor Deficits] : no motor deficits [TextBox_11] : no lesion moist   [TextBox_2] : pleasant f no distress speaking full sentences  no cough

## 2024-02-29 NOTE — ASSESSMENT
[FreeTextEntry1] : 74y/o female from [facility]  1- asthma - trigger infection allergies GERD  improved  3- CAD/ stent normal ECHO 3-  10/23  moderate  CHEYANNE  -     tolerates  lower pressures now   improved  4- h/o stroke with early memory changes mouth movement at risk for aspiration 5- EGD_ 4/2023 grade A esophagitis compatible reflux esophagitis non- erosive gastritis 6- vaccinations covid ( covid x three ) flu  Recommendations 1-  autopap   4- 16 cm   water    discussed  to use  longer  now  puts on her mask  and at least  4 hours 2- budesonide q 12 3- albuterol neb q 8 hour prn - to try 1/2 dose due to fatigue  4- albuterol MDI prn   return in three months  agreement on plan

## 2024-02-29 NOTE — PROCEDURE
[FreeTextEntry1] : PROCEDURE DATE: 04/27/2023    INTERPRETATION: Chest radiographs CPT 13262  CLINICAL INFORMATION: Ribs of breath of unknown severity x1 week.  TECHNIQUE: Frontal and lateral views of the chest were obtained.  FINDINGS: Prior study dated 10/17/2022 was available for review.  The lungs are clear. No pleural abnormality is seen.  The heart and mediastinum appear intact.   IMPRESSION: No evidence of active chest disease.  --- End of Report ---      MILAGRO ESPINOZA MD; Attending Radiologist This document has been electronically signed. Apr 27 2023 4:15PM

## 2024-03-05 LAB
CHOLEST SERPL-MCNC: 114 MG/DL
ESTIMATED AVERAGE GLUCOSE: 131 MG/DL
HBA1C MFR BLD HPLC: 6.2 %
HDLC SERPL-MCNC: 48 MG/DL
LDLC SERPL CALC-MCNC: 52 MG/DL
NONHDLC SERPL-MCNC: 66 MG/DL
TRIGL SERPL-MCNC: 62 MG/DL
TSH SERPL-ACNC: 0.34 UIU/ML

## 2024-03-06 ENCOUNTER — APPOINTMENT (OUTPATIENT)
Dept: NEUROLOGY | Facility: CLINIC | Age: 74
End: 2024-03-06

## 2024-03-06 NOTE — HISTORY OF PRESENT ILLNESS
[FreeTextEntry1] : NO COVID.   COVID VACCINE FULL.  HPI:    PMH:    -Memory:  -Speech:  -Orientation:  -Praxis:  -Decision making/Executive fx/Multitasking:    -Sleep:    -Appetite:    -Motor symptoms:     -B/B:    -Psychiatric symptoms:    -Functional status:   Williamson Index of [place] in Activities of Daily Livin. Bathing/Showerin. Dressing:   3. Toiletin. Transferrin. Continence:   6: Feeding:     TOTAL:         Mckenna-[place] Instrumental Activities of Daily Living:  A. Ability to Use Telephone:   B. Shopping:   C. Food Preparation:    D. Housekeeping:   E. Laundry:   F. Transportation:    G. Responsibility for Own Medications:   H: Ability to Handle Finances:   TOTAL:     CDR:    -Professional status:     PCP and other physicians:  -PCP:   -Neuro:   -Psychiatrist:  -Oncologist:  -Rheumatologist:    Workup done:

## 2024-03-06 NOTE — DATA REVIEWED
[de-identified] : ACC: [id number] EXAM: MR BRAIN  PROCEDURE DATE: 06/23/2022    INTERPRETATION: HISTORY: Memory loss.  COMPARISON: MRI head 2/22/2022  TECHNIQUE: On a 1.5 T MRI scanner coronal T1, sagittal T1, axial T1, T2, GRE, FLAIR and DWI sequences of the brain were performed and submitted for interpretation.  FINDINGS:  The signal intensity of the cerebrum, brainstem, and cerebellum is within normal limits. Parenchymal volume loss is identified. This parenchymal volume loss does appear to be more prominent in the bilateral sylvian fissure region. Please correlate clinically.  There is no acute infarction, mass, hemorrhage, extra-axial fluid collection, or shift of midline structures.  The ventricles and sulci are normal size for patient's stated age without of hydrocephalus. Basal cisterns are clear.  Flow-voids are present in the major intracranial vessels, indicating patency.  Nodular mucosal thickening involving the right maxillary sinus, the rest of the paranasal sinuses and mastoid air cells are clear.  IMPRESSION:  Parenchymal volume loss as described above.  --- End of Report ---     CAMILA DOMINGUEZ MD; Resident Interventional Radiology This document has been electronically signed. VINITA LAZARO MD; Attending Radiologist This document has been electronically signed. Jun 24 2022 6:17PM

## 2024-03-06 NOTE — PHYSICAL EXAM
[FreeTextEntry4] : Mental Status Exam   Presidents: Alternating Pattern:  Spiral: Clock: Repetition: Trail A: B:  Fluency: A: Animals:   Go-No-Go:    R/L discrimination on self and examiner:  Cross-line commands:  Praxis: - Motor: -Dynamic/Luria:  -Ideomotor/Imitation:   -Ideational/writing/closing-in:  -Dressing:

## 2024-03-26 ENCOUNTER — APPOINTMENT (OUTPATIENT)
Dept: NEUROLOGY | Facility: CLINIC | Age: 74
End: 2024-03-26

## 2024-03-28 ENCOUNTER — APPOINTMENT (OUTPATIENT)
Dept: FAMILY MEDICINE | Facility: CLINIC | Age: 74
End: 2024-03-28
Payer: MEDICARE

## 2024-03-28 VITALS
SYSTOLIC BLOOD PRESSURE: 114 MMHG | OXYGEN SATURATION: 97 % | HEART RATE: 72 BPM | DIASTOLIC BLOOD PRESSURE: 73 MMHG | RESPIRATION RATE: 16 BRPM | TEMPERATURE: 98.3 F

## 2024-03-28 DIAGNOSIS — R73.01 IMPAIRED FASTING GLUCOSE: ICD-10-CM

## 2024-03-28 PROCEDURE — 99213 OFFICE O/P EST LOW 20 MIN: CPT

## 2024-03-28 PROCEDURE — G2211 COMPLEX E/M VISIT ADD ON: CPT

## 2024-03-30 NOTE — ASSESSMENT
[FreeTextEntry1] : RTO 3 months or sooner prn   I am providing continuous care for this patient that includes testing, discussion of options for treatment, and shared decision making with regard to the chosen treatment.

## 2024-03-30 NOTE — HISTORY OF PRESENT ILLNESS
[FreeTextEntry6] : 75y/o female born in [facility] w/ history of stroke 2012 with residual speech abnormality and memory changes, syphilis s/p treatment (concern for neurosyphilis), history of former nicotine use, CAD with stent placement, CHEYANNE, pre-diabetes, hyperlipidemia presents to review blood work. She presents with her HHA Liseth.   Shikha is asking about her potential diagnosis of neurosyphilis. What are the next steps. Asking if she needs to stay on atorvastatin  Specialists: Pulmonary- Dr. Finney Neuro-Dr. Mina Urology- Dr. Crook GYN-Dr. Beatty Cardiology- Dr. Mccarthy  Healthcare Maintenance Pap smear -10/2023 Mammo - 06/2023 Colon ca screening - 4/2023 return 10 years DEXA - ?   Patient accompanied by formal caregiver and Liseth Pastrana.

## 2024-03-30 NOTE — PHYSICAL EXAM
[No Respiratory Distress] : no respiratory distress  [Normal] : affect was normal and insight and judgment were intact [de-identified] : wearing glasses [de-identified] : abnormal tongue movements noted with speech, no other focal deficits noted.

## 2024-04-11 ENCOUNTER — NON-APPOINTMENT (OUTPATIENT)
Age: 74
End: 2024-04-11

## 2024-05-02 ENCOUNTER — APPOINTMENT (OUTPATIENT)
Dept: FAMILY MEDICINE | Facility: CLINIC | Age: 74
End: 2024-05-02
Payer: MEDICARE

## 2024-05-02 ENCOUNTER — APPOINTMENT (OUTPATIENT)
Dept: RHEUMATOLOGY | Facility: CLINIC | Age: 74
End: 2024-05-02
Payer: MEDICARE

## 2024-05-02 VITALS
HEART RATE: 83 BPM | RESPIRATION RATE: 15 BRPM | BODY MASS INDEX: 29.44 KG/M2 | WEIGHT: 160 LBS | SYSTOLIC BLOOD PRESSURE: 128 MMHG | OXYGEN SATURATION: 98 % | DIASTOLIC BLOOD PRESSURE: 70 MMHG | HEIGHT: 62 IN | TEMPERATURE: 97.3 F

## 2024-05-02 VITALS
SYSTOLIC BLOOD PRESSURE: 119 MMHG | DIASTOLIC BLOOD PRESSURE: 66 MMHG | RESPIRATION RATE: 16 BRPM | HEART RATE: 69 BPM | TEMPERATURE: 98 F | OXYGEN SATURATION: 96 % | BODY MASS INDEX: 29.45 KG/M2 | WEIGHT: 161 LBS

## 2024-05-02 DIAGNOSIS — A53.0 LATENT SYPHILIS, UNSPECIFIED AS EARLY OR LATE: ICD-10-CM

## 2024-05-02 DIAGNOSIS — Z87.898 PERSONAL HISTORY OF OTHER SPECIFIED CONDITIONS: ICD-10-CM

## 2024-05-02 DIAGNOSIS — R70.0 ELEVATED ERYTHROCYTE SEDIMENTATION RATE: ICD-10-CM

## 2024-05-02 DIAGNOSIS — M25.50 PAIN IN UNSPECIFIED JOINT: ICD-10-CM

## 2024-05-02 DIAGNOSIS — Z01.818 ENCOUNTER FOR OTHER PREPROCEDURAL EXAMINATION: ICD-10-CM

## 2024-05-02 DIAGNOSIS — R76.8 OTHER SPECIFIED ABNORMAL IMMUNOLOGICAL FINDINGS IN SERUM: ICD-10-CM

## 2024-05-02 PROCEDURE — G2211 COMPLEX E/M VISIT ADD ON: CPT

## 2024-05-02 PROCEDURE — 99214 OFFICE O/P EST MOD 30 MIN: CPT

## 2024-05-02 PROCEDURE — 99204 OFFICE O/P NEW MOD 45 MIN: CPT

## 2024-05-02 NOTE — HISTORY OF PRESENT ILLNESS
[FreeTextEntry6] : 73y/o female born in [facility] w/ history of stroke 2012 with residual speech abnormality and memory changes, syphilis s/p treatment (concern for neurosyphilis), history of former nicotine use, CAD with stent placement, CHEYANNE, pre-diabetes, hyperlipidemia presents to review blood work. She presents with her HHA Liseth.   Shikha is asking about her potential diagnosis of neurosyphilis. What are the next steps. Asking if she needs to stay on atorvastatin  Specialists: Pulmonary- Dr. Finney Neuro-Dr. Mina Urology- Dr. Crook GYN-Dr. Beatty Cardiology- Dr. Mccarthy  Healthcare Maintenance Pap smear -10/2023 Mammo - 06/2023 Colon ca screening - 4/2023 return 10 years DEXA - ?   Patient accompanied by formal caregiver and Liseth Pastrana. [Aortic Stenosis] : no aortic stenosis [Atrial Fibrillation] : no atrial fibrillation [Coronary Artery Disease] : coronary artery disease [Recent Myocardial Infarction] : no recent myocardial infarction [Implantable Device/Pacemaker] : no implantable device/pacemaker [Asthma] : asthma [Sleep Apnea] : sleep apnea [Smoker] : not a smoker [No Adverse Anesthesia Reaction] : no adverse anesthesia reaction in self or family member [Chronic Anticoagulation] : no chronic anticoagulation [Chronic Kidney Disease] : no chronic kidney disease [Diabetes] : no diabetes [(Patient denies any chest pain, claudication, dyspnea on exertion, orthopnea, palpitations or syncope)] : Patient denies any chest pain, claudication, dyspnea on exertion, orthopnea, palpitations or syncope [FreeTextEntry1] : Lumbar puncture [FreeTextEntry4] : 75y/o female born in [facility] w/ history of stroke 2012 with residual speech abnormality and memory changes, syphilis s/p treatment (concern for neurosyphilis), history of former nicotine use, CAD with stent placement, CHEYANNE, pre-diabetes, hyperlipidemia presents for medical clearance for impending lumbar puncture. The test will be performed to further workup potential neurosyphillis.

## 2024-05-02 NOTE — PHYSICAL EXAM
[Normal] : affect was normal and insight and judgment were intact [de-identified] : wearing glasses [de-identified] : abnormal tongue movements noted with speech, no other focal deficits noted.

## 2024-05-02 NOTE — PHYSICAL EXAM
[Normal] : affect was normal and insight and judgment were intact [de-identified] : wearing glasses [de-identified] : abnormal tongue movements noted with speech, no other focal deficits noted.

## 2024-05-02 NOTE — INTERPRETER SERVICES
[Interpreters_IDNumber] : [place] [Patient Declined  Services] : - None: Patient declined  services

## 2024-05-02 NOTE — ASSESSMENT
[FreeTextEntry1] : RTO 3 months or sooner prn   I am providing continuous care for this patient that includes testing, discussion of options for treatment, and shared decision making with regard to the chosen treatment. [High Risk Surgery - Intraperitoneal, Intrathoracic or Supringuinal Vascular Procedures] : High Risk Surgery - Intraperitoneal, Intrathoracic or Supringuinal Vascular Procedures - No (0) [Ischemic Heart Disease] : Ischemic Heart Disease - No (0) [Congestive Heart Failure] : Congestive Heart Failure - No (0) [Prior Cerebrovascular Accident or TIA] : Prior Cerebrovascular Accident or TIA - Yes (1) [Creatinine >= 2mg/dL (1 Point)] : Creatinine >= 2mg/dL - No (0) [Insulin-dependent Diabetic (1 Point)] : Insulin-dependent Diabetic - No (0) [1] : 1 , RCRI Class: II, Risk of Post-Op Cardiac Complications: 6.0%, 95% CI for Risk Estimate: 4.9% - 7.4% [Patient Requires Further Testing] : Patient requires further testing [Cardiology consultation] : Cardiology consultation [Modify anti-platelet treatment prior to procedure] : Modify anti-platelet treatment prior to procedure [FreeTextEntry4] : final clearance pending cardiac assessment [FreeTextEntry6] : stop clopidogrel 5 days prior to procedure

## 2024-05-02 NOTE — HISTORY OF PRESENT ILLNESS
[FreeTextEntry6] : 75y/o female born in [facility] w/ history of stroke 2012 with residual speech abnormality and memory changes, syphilis s/p treatment (concern for neurosyphilis), history of former nicotine use, CAD with stent placement, CHEYANNE, pre-diabetes, hyperlipidemia presents to review blood work. She presents with her HHA Liseth.   Shikha is asking about her potential diagnosis of neurosyphilis. What are the next steps. Asking if she needs to stay on atorvastatin  Specialists: Pulmonary- Dr. Finney Neuro-Dr. Mina Urology- Dr. Crook GYN-Dr. Beatty Cardiology- Dr. Mccarthy  Healthcare Maintenance Pap smear -10/2023 Mammo - 06/2023 Colon ca screening - 4/2023 return 10 years DEXA - ?   Patient accompanied by formal caregiver and Liseth Pastrana. [Aortic Stenosis] : no aortic stenosis [Atrial Fibrillation] : no atrial fibrillation [Coronary Artery Disease] : coronary artery disease [Recent Myocardial Infarction] : no recent myocardial infarction [Implantable Device/Pacemaker] : no implantable device/pacemaker [Asthma] : asthma [Sleep Apnea] : sleep apnea [Smoker] : not a smoker [No Adverse Anesthesia Reaction] : no adverse anesthesia reaction in self or family member [Chronic Anticoagulation] : no chronic anticoagulation [Chronic Kidney Disease] : no chronic kidney disease [Diabetes] : no diabetes [(Patient denies any chest pain, claudication, dyspnea on exertion, orthopnea, palpitations or syncope)] : Patient denies any chest pain, claudication, dyspnea on exertion, orthopnea, palpitations or syncope [FreeTextEntry1] : Lumbar puncture [FreeTextEntry4] : 73y/o female born in [facility] w/ history of stroke 2012 with residual speech abnormality and memory changes, syphilis s/p treatment (concern for neurosyphilis), history of former nicotine use, CAD with stent placement, CHEYANNE, pre-diabetes, hyperlipidemia presents for medical clearance for impending lumbar puncture. The test will be performed to further workup potential neurosyphillis.

## 2024-05-03 ENCOUNTER — APPOINTMENT (OUTPATIENT)
Dept: RADIOLOGY | Facility: HOSPITAL | Age: 74
End: 2024-05-03
Payer: MEDICARE

## 2024-05-03 ENCOUNTER — OUTPATIENT (OUTPATIENT)
Dept: OUTPATIENT SERVICES | Facility: HOSPITAL | Age: 74
LOS: 1 days | End: 2024-05-03
Payer: MEDICARE

## 2024-05-03 DIAGNOSIS — Z95.5 PRESENCE OF CORONARY ANGIOPLASTY IMPLANT AND GRAFT: Chronic | ICD-10-CM

## 2024-05-03 DIAGNOSIS — Z98.891 HISTORY OF UTERINE SCAR FROM PREVIOUS SURGERY: Chronic | ICD-10-CM

## 2024-05-03 DIAGNOSIS — R70.0 ELEVATED ERYTHROCYTE SEDIMENTATION RATE: ICD-10-CM

## 2024-05-03 DIAGNOSIS — R76.8 OTHER SPECIFIED ABNORMAL IMMUNOLOGICAL FINDINGS IN SERUM: ICD-10-CM

## 2024-05-03 LAB
APTT BLD: 35.6 SEC
INR PPP: 1.03 RATIO
PT BLD: 11.6 SEC

## 2024-05-03 PROCEDURE — 73120 X-RAY EXAM OF HAND: CPT | Mod: 26,LT,RT

## 2024-05-03 PROCEDURE — 73030 X-RAY EXAM OF SHOULDER: CPT | Mod: 26,LT,RT

## 2024-05-03 PROCEDURE — 73562 X-RAY EXAM OF KNEE 3: CPT | Mod: 26,LT,RT

## 2024-05-03 PROCEDURE — 73600 X-RAY EXAM OF ANKLE: CPT

## 2024-05-03 PROCEDURE — 73600 X-RAY EXAM OF ANKLE: CPT | Mod: 26,LT,RT

## 2024-05-03 PROCEDURE — 73100 X-RAY EXAM OF WRIST: CPT

## 2024-05-03 PROCEDURE — 73120 X-RAY EXAM OF HAND: CPT

## 2024-05-03 PROCEDURE — 73562 X-RAY EXAM OF KNEE 3: CPT

## 2024-05-03 PROCEDURE — 73100 X-RAY EXAM OF WRIST: CPT | Mod: 26,LT,RT

## 2024-05-03 PROCEDURE — 73030 X-RAY EXAM OF SHOULDER: CPT

## 2024-05-06 RX ORDER — CITALOPRAM HYDROBROMIDE 20 MG/1
20 TABLET, FILM COATED ORAL
Qty: 90 | Refills: 2 | Status: ACTIVE | COMMUNITY
Start: 2021-10-11 | End: 1900-01-01

## 2024-05-07 ENCOUNTER — APPOINTMENT (OUTPATIENT)
Dept: CARDIOLOGY | Facility: CLINIC | Age: 74
End: 2024-05-07
Payer: MEDICARE

## 2024-05-07 ENCOUNTER — NON-APPOINTMENT (OUTPATIENT)
Age: 74
End: 2024-05-07

## 2024-05-07 VITALS — OXYGEN SATURATION: 96 % | HEIGHT: 62 IN | BODY MASS INDEX: 29.44 KG/M2 | HEART RATE: 105 BPM | WEIGHT: 160 LBS

## 2024-05-07 DIAGNOSIS — I20.0 UNSTABLE ANGINA: ICD-10-CM

## 2024-05-07 DIAGNOSIS — I67.82 CEREBRAL ISCHEMIA: ICD-10-CM

## 2024-05-07 PROCEDURE — 93000 ELECTROCARDIOGRAM COMPLETE: CPT

## 2024-05-07 PROCEDURE — G2211 COMPLEX E/M VISIT ADD ON: CPT | Mod: NC,1L

## 2024-05-07 PROCEDURE — 99215 OFFICE O/P EST HI 40 MIN: CPT | Mod: 25

## 2024-05-07 RX ORDER — ALBUTEROL SULFATE 2.5 MG/3ML
(2.5 MG/3ML) SOLUTION RESPIRATORY (INHALATION)
Qty: 1 | Refills: 3 | Status: DISCONTINUED | COMMUNITY
Start: 2023-09-21 | End: 2024-05-07

## 2024-05-07 NOTE — REASON FOR VISIT
[Other: ____] : [unfilled] [FreeTextEntry1] : Patient presents as a difficult management problem.  I originally received a phone message asking me to write a note that she could come off clopidogrel to have a spinal tap.  Since I have not seen the patient in over a year I informed the caller that I must see the patient.  On presentation the patient is now complaining of a month of chest discomfort with exertion of walking several blocks as well as shortness of breath.  She saw her primary physician 5 days ago and this was not reported.  I asked both the patient and her daughter through an  why this was not brought up then and they stated "we forgot".  The patient does have a history of coronary disease and has had stents in the past.  Of note is the fact that she did complain of similar symptoms in 2021 and underwent cardiac catheterization which revealed nonobstructive disease.  That was the last time I saw the patient.

## 2024-05-07 NOTE — ASSESSMENT
[FreeTextEntry1] : In summary, the patient is a 74-year-old woman with history of coronary disease and stenting who now presents with symptoms fairly suggestive of angina pectoris.  However of note is the fact that she had similar symptoms to this 3 years ago and had no evidence of obstructive disease and has had no events since that time.  For now due to her symptoms we will get echocardiography as well as vasodilator myocardial perfusion imaging.  Will await the results of these test.  Will then need to discuss urgency of the spinal tap as any cardiac intervention would require at least 6 months of dual antiplatelet therapy which would preclude a spinal tap.

## 2024-05-08 ENCOUNTER — APPOINTMENT (OUTPATIENT)
Dept: CARDIOLOGY | Facility: CLINIC | Age: 74
End: 2024-05-08
Payer: MEDICARE

## 2024-05-08 PROCEDURE — 93306 TTE W/DOPPLER COMPLETE: CPT

## 2024-05-14 ENCOUNTER — APPOINTMENT (OUTPATIENT)
Dept: CARDIOLOGY | Facility: CLINIC | Age: 74
End: 2024-05-14
Payer: MEDICARE

## 2024-05-14 PROCEDURE — 78452 HT MUSCLE IMAGE SPECT MULT: CPT

## 2024-05-14 PROCEDURE — 93015 CV STRESS TEST SUPVJ I&R: CPT

## 2024-05-14 PROCEDURE — A9500: CPT

## 2024-05-14 RX ORDER — GABAPENTIN 400 MG/1
1 CAPSULE ORAL
Qty: 0 | Refills: 0 | DISCHARGE

## 2024-05-14 RX ORDER — CLONAZEPAM 1 MG
1 TABLET ORAL
Qty: 0 | Refills: 0 | DISCHARGE

## 2024-05-14 RX ORDER — HYDROXYCHLOROQUINE SULFATE 200 MG
1 TABLET ORAL
Qty: 0 | Refills: 0 | DISCHARGE

## 2024-05-14 RX ORDER — HYDROXYZINE HCL 10 MG
1 TABLET ORAL
Qty: 0 | Refills: 0 | DISCHARGE

## 2024-05-14 RX ORDER — CITALOPRAM 10 MG/1
1 TABLET, FILM COATED ORAL
Qty: 0 | Refills: 0 | DISCHARGE

## 2024-05-14 RX ORDER — ATORVASTATIN CALCIUM 80 MG/1
1 TABLET, FILM COATED ORAL
Qty: 0 | Refills: 0 | DISCHARGE

## 2024-05-14 RX ORDER — CLOPIDOGREL BISULFATE 75 MG/1
1 TABLET, FILM COATED ORAL
Qty: 90 | Refills: 3

## 2024-05-14 RX ORDER — ATENOLOL 25 MG/1
1 TABLET ORAL
Qty: 0 | Refills: 0 | DISCHARGE

## 2024-05-14 RX ORDER — ESOMEPRAZOLE MAGNESIUM 40 MG/1
1 CAPSULE, DELAYED RELEASE ORAL
Qty: 0 | Refills: 0 | DISCHARGE

## 2024-05-21 ENCOUNTER — NON-APPOINTMENT (OUTPATIENT)
Age: 74
End: 2024-05-21

## 2024-05-21 ENCOUNTER — APPOINTMENT (OUTPATIENT)
Dept: CARDIOLOGY | Facility: CLINIC | Age: 74
End: 2024-05-21
Payer: MEDICARE

## 2024-05-21 VITALS — HEART RATE: 72 BPM | SYSTOLIC BLOOD PRESSURE: 110 MMHG | DIASTOLIC BLOOD PRESSURE: 80 MMHG

## 2024-05-21 VITALS — OXYGEN SATURATION: 97 % | WEIGHT: 164 LBS | BODY MASS INDEX: 30.18 KG/M2 | HEART RATE: 68 BPM | HEIGHT: 62 IN

## 2024-05-21 DIAGNOSIS — Z01.810 ENCOUNTER FOR PREPROCEDURAL CARDIOVASCULAR EXAMINATION: ICD-10-CM

## 2024-05-21 DIAGNOSIS — I25.10 ATHEROSCLEROTIC HEART DISEASE OF NATIVE CORONARY ARTERY W/OUT ANGINA PECTORIS: ICD-10-CM

## 2024-05-21 PROCEDURE — 93000 ELECTROCARDIOGRAM COMPLETE: CPT

## 2024-05-21 PROCEDURE — 99215 OFFICE O/P EST HI 40 MIN: CPT | Mod: 25

## 2024-05-21 NOTE — REASON FOR VISIT
[Other: ____] : [unfilled] [FreeTextEntry1] : Patient returns for reevaluation she continues to remain difficult management problem.  She has undergone noninvasive workup and this is negative for ischemia or left ventricular systolic dysfunction.  From that perspective she can be "cleared" to undergo a spinal tap.  However according to her daughter she has been told by "the hospital" where she is supposed to have the procedure tomorrow that she should be off clopidogrel for 5 days.  She has not been offered at all.  I have explained to her that given her stents off antiplatelet therapy she runs a 20% risk of an acute coronary event.  Therefore if aspirin would be preferable to clopidogrel then she should be on aspirin.  The daughter states however the patient gets severe bruising from aspirin.  I have told her this would only be short-term.  She has been unable to provide me with the name of the physician who will be performing the procedure and therefore I cannot contact anyone at this time.  She states she will call "the hospital who is doing the procedure" and "the hospital will call me"

## 2024-05-28 ENCOUNTER — APPOINTMENT (OUTPATIENT)
Dept: RHEUMATOLOGY | Facility: CLINIC | Age: 74
End: 2024-05-28

## 2024-05-28 LAB
ACE BLD-CCNC: 43 U/L
ALBUMIN SERPL ELPH-MCNC: 4.4 G/DL
ALP BLD-CCNC: 84 U/L
ALT SERPL-CCNC: 18 U/L
ANA PAT FLD IF-IMP: ABNORMAL
ANA SER IF-ACNC: ABNORMAL
ANION GAP SERPL CALC-SCNC: 14 MMOL/L
AST SERPL-CCNC: 19 U/L
BASOPHILS # BLD AUTO: 0.03 K/UL
BASOPHILS NFR BLD AUTO: 0.6 %
BILIRUB SERPL-MCNC: 0.5 MG/DL
BUN SERPL-MCNC: 15 MG/DL
C3 SERPL-MCNC: 110 MG/DL
C4 SERPL-MCNC: 25 MG/DL
CALCIUM SERPL-MCNC: 9.8 MG/DL
CCP AB SER IA-ACNC: <8 UNITS
CHLORIDE SERPL-SCNC: 100 MMOL/L
CO2 SERPL-SCNC: 24 MMOL/L
CREAT SERPL-MCNC: 0.66 MG/DL
CRP SERPL-MCNC: 4 MG/L
DSDNA AB SER-ACNC: <1 IU/ML
EGFR: 93 ML/MIN/1.73M2
ENA SS-A AB SER IA-ACNC: <0.2 AL
ENA SS-B AB SER IA-ACNC: <0.2 AL
EOSINOPHIL # BLD AUTO: 0.18 K/UL
EOSINOPHIL NFR BLD AUTO: 3.3 %
ERYTHROCYTE [SEDIMENTATION RATE] IN BLOOD BY WESTERGREN METHOD: 52 MM/HR
G6PD SER-CCNC: 11.9 U/G HGB
GLUCOSE SERPL-MCNC: 103 MG/DL
HCT VFR BLD CALC: 38.5 %
HGB BLD-MCNC: 12.5 G/DL
IMM GRANULOCYTES NFR BLD AUTO: 0 %
LYMPHOCYTES # BLD AUTO: 2.17 K/UL
LYMPHOCYTES NFR BLD AUTO: 39.9 %
MAN DIFF?: NORMAL
MCHC RBC-ENTMCNC: 30.3 PG
MCHC RBC-ENTMCNC: 32.5 GM/DL
MCV RBC AUTO: 93.2 FL
MONOCYTES # BLD AUTO: 0.35 K/UL
MONOCYTES NFR BLD AUTO: 6.4 %
NEUTROPHILS # BLD AUTO: 2.71 K/UL
NEUTROPHILS NFR BLD AUTO: 49.8 %
PLATELET # BLD AUTO: 313 K/UL
POTASSIUM SERPL-SCNC: 4 MMOL/L
PROT SERPL-MCNC: 7.7 G/DL
RBC # BLD: 4.13 M/UL
RBC # FLD: 14.7 %
RF+CCP IGG SER-IMP: NEGATIVE
RHEUMATOID FACT SER QL: <10 IU/ML
SODIUM SERPL-SCNC: 138 MMOL/L
THYROGLOB AB SERPL-ACNC: <20 IU/ML
THYROPEROXIDASE AB SERPL IA-ACNC: <10 IU/ML
WBC # FLD AUTO: 5.44 K/UL

## 2024-05-28 NOTE — HISTORY OF PRESENT ILLNESS
[FreeTextEntry1] :  # [id number]  JESSE BLACK is a 74 year old woman who presents with diffuse arthralgias, progressive over the last 2 years, no synovitis, no clear trigger of symptoms. Tylenol partially helps BID, occasional GI upset. Using topicals with partial benefit.   Inflammatory arthritis ROS negative for symmetrical peripheral joint synovitis, prolonged AM stiffness, enthesitis, dactylitis, psoriasis/ rashes, eye inflammation, inflammatory low back pain, IBD.   SLE ROS negative for focal alopecia, sicca, salivary gland swelling, oral ulcers, malar rash, photosensitivity, serositis, abd pain, dysuria/ hematuria, hematologic abnormalities, Raynauds.   Remote labs 2022 -- ZOË 1:320 (DFS 70), ESR/CRP elevated  Previously saw Dr Batista remotely - ?RA dx per pt report but not given meds  Negative FH for autoimmune d/o  CVA in 2012, remote hx of syphilis in 1986 - thinks she was treated, PMD is pursuing LP upcoming due to chronic facial dyskinesia movements, memory issues, and + labs

## 2024-05-28 NOTE — PHYSICAL EXAM
[General Appearance - Alert] : alert [General Appearance - In No Acute Distress] : in no acute distress [General Appearance - Well Nourished] : well nourished [Sclera] : the sclera and conjunctiva were normal [PERRL With Normal Accommodation] : pupils were equal in size, round, and reactive to light [Extraocular Movements] : extraocular movements were intact [No Focal Deficits] : no focal deficits [Oriented To Time, Place, And Person] : oriented to person, place, and time [Impaired Insight] : insight and judgment were intact [Affect] : the affect was normal [Outer Ear] : the ears and nose were normal in appearance [Oropharynx] : the oropharynx was normal [Neck Appearance] : the appearance of the neck was normal [] : no respiratory distress [Auscultation Breath Sounds / Voice Sounds] : lungs were clear to auscultation bilaterally [Heart Rate And Rhythm] : heart rate was normal and rhythm regular [Heart Sounds] : normal S1 and S2 [Edema] : there was no peripheral edema [No Spinal Tenderness] : no spinal tenderness [Abnormal Walk] : normal gait [Nail Clubbing] : no clubbing  or cyanosis of the fingernails [Musculoskeletal - Swelling] : no joint swelling seen [Motor Tone] : muscle strength and tone were normal [Murmurs] : no murmurs [Cervical Lymph Nodes Enlarged Posterior Bilaterally] : posterior cervical [Cervical Lymph Nodes Enlarged Anterior Bilaterally] : anterior cervical [Supraclavicular Lymph Nodes Enlarged Bilaterally] : supraclavicular [No CVA Tenderness] : no ~M costovertebral angle tenderness [FreeTextEntry1] : As above

## 2024-05-28 NOTE — ASSESSMENT
[FreeTextEntry1] : JESSE BLACK is a 74 year old woman with below --   # hx of chronic arthralgias, reports remote hx of RA but never treated - currently no inflammatory arthritis or CTD sx by history or exam # + ZOË but in pattern least associated with CTD development, ESR which is nonspecific  # suspect component of FMS based on exam today  - will obtain/review prior chart from Dr Batista  - serologies as below to be thorough - ZOË and ESR prior + reviewed with her. Discussed that ZOË can be seen in 10-15% of normal population, + Ab incidence increases with age and with presence of other family members with hx of autoimmune dz or Ab positivity. Also discussed that ZOË alone does not confer a diagnosis and that presently she does not meet criteria for SLE. - XR as below  - c/w tylenol BID and topicals for now  - reports has LP set up for 5/10/24 - agree to check for neurosyphilis as may be contributing to her symptoms  All questions and concerns addressed to my best possible ability, patient verbalizes understanding and is agreeable. RTC 3 weeks

## 2024-05-28 NOTE — REVIEW OF SYSTEMS
[Negative] : Heme/Lymph [Arthralgias] : arthralgias [Joint Pain] : joint pain [Joint Swelling] : no joint swelling [Joint Stiffness] : no joint stiffness [As Noted in HPI] : as noted in HPI

## 2024-06-06 ENCOUNTER — APPOINTMENT (OUTPATIENT)
Dept: FAMILY MEDICINE | Facility: CLINIC | Age: 74
End: 2024-06-06
Payer: MEDICARE

## 2024-06-06 VITALS
BODY MASS INDEX: 28.9 KG/M2 | RESPIRATION RATE: 15 BRPM | OXYGEN SATURATION: 97 % | SYSTOLIC BLOOD PRESSURE: 103 MMHG | HEART RATE: 66 BPM | TEMPERATURE: 98.5 F | WEIGHT: 158 LBS | DIASTOLIC BLOOD PRESSURE: 60 MMHG

## 2024-06-06 DIAGNOSIS — M25.569 PAIN IN UNSPECIFIED KNEE: ICD-10-CM

## 2024-06-06 DIAGNOSIS — G89.29 PAIN IN UNSPECIFIED KNEE: ICD-10-CM

## 2024-06-06 DIAGNOSIS — J02.9 ACUTE PHARYNGITIS, UNSPECIFIED: ICD-10-CM

## 2024-06-06 LAB — S PYO AG SPEC QL IA: NEGATIVE

## 2024-06-06 PROCEDURE — 87880 STREP A ASSAY W/OPTIC: CPT | Mod: QW

## 2024-06-06 PROCEDURE — G2211 COMPLEX E/M VISIT ADD ON: CPT

## 2024-06-06 PROCEDURE — 99213 OFFICE O/P EST LOW 20 MIN: CPT

## 2024-06-06 NOTE — REVIEW OF SYSTEMS
[Earache] : earache [Sore Throat] : sore throat [Cough] : cough [Joint Pain] : joint pain [Negative] : Respiratory

## 2024-06-06 NOTE — PHYSICAL EXAM
[Normal Oropharynx] : the oropharynx was normal [Normal TMs] : both tympanic membranes were normal [Coordination Grossly Intact] : coordination grossly intact [No Focal Deficits] : no focal deficits [Normal] : affect was normal and insight and judgment were intact [de-identified] : left anterior cervical region tender to palpation, no enlarged nodes

## 2024-06-06 NOTE — HISTORY OF PRESENT ILLNESS
[FreeTextEntry8] : 75y/o female born in [facility] w/ history of stroke 2012 with residual speech abnormality and memory changes, syphilis s/p treatment (concern for neurosyphilis), history of former nicotine use, CAD with stent placement, CHEYANNE, pre-diabetes, hyperlipidemia presents for concern of sore throat. Also needs referral for PT for right knee pain (chronic)   She notes her lumber puncture never happened. Confusion regarding appointment time at the hospital.  [___ Weeks ago] :  [unfilled] weeks ago [Constant] : constant [Cough] : cough [Sore Throat] : sore throat [Shortness Of Breath] : no shortness of breath [Stable] : stable

## 2024-06-10 LAB — BACTERIA THROAT CULT: NORMAL

## 2024-06-21 NOTE — PATIENT PROFILE ADULT - OVER THE PAST TWO WEEKS HAVE YOU FELT DOWN, DEPRESSED OR HOPELESS?
Patient met 2 SIRS (, WBC 19.4) on admission likely in setting of cellulitis.  Lactate 1.6,  In the ED he received Ceftriaxone 1g IV and Vancomycin 1250mg IV x 2.  On exam 3cm x 5cm area of ulceration with purulence and surrounding redness.  Wound culture with gram positive cocci in pairs.    - BC: Patient met 2 SIRS (, WBC 19.4) on admission likely in setting of cellulitis.  Lactate 1.6,  In the ED he received Ceftriaxone 1g IV and Vancomycin 1250mg IV x 2.  On exam 3cm x 5cm area of ulceration with purulence and surrounding redness.  Wound culture with gram positive cocci in pairs.    - BC: MRSA  plan  - repeat Lacie BC  - TTE  - ID consult  - Vancomycin 1250 mg, repeat trough tonight 8:00 pm no

## 2024-06-27 ENCOUNTER — APPOINTMENT (OUTPATIENT)
Dept: FAMILY MEDICINE | Facility: CLINIC | Age: 74
End: 2024-06-27
Payer: MEDICARE

## 2024-06-27 VITALS
HEIGHT: 62 IN | HEART RATE: 77 BPM | BODY MASS INDEX: 29.63 KG/M2 | DIASTOLIC BLOOD PRESSURE: 70 MMHG | SYSTOLIC BLOOD PRESSURE: 127 MMHG | WEIGHT: 161 LBS | OXYGEN SATURATION: 98 % | TEMPERATURE: 98.3 F | RESPIRATION RATE: 16 BRPM

## 2024-06-27 DIAGNOSIS — Z87.898 PERSONAL HISTORY OF OTHER SPECIFIED CONDITIONS: ICD-10-CM

## 2024-06-27 DIAGNOSIS — G89.29 PAIN IN RIGHT SHOULDER: ICD-10-CM

## 2024-06-27 DIAGNOSIS — M25.511 PAIN IN RIGHT SHOULDER: ICD-10-CM

## 2024-06-27 DIAGNOSIS — I10 ESSENTIAL (PRIMARY) HYPERTENSION: ICD-10-CM

## 2024-06-27 PROCEDURE — 99213 OFFICE O/P EST LOW 20 MIN: CPT

## 2024-06-27 PROCEDURE — G2211 COMPLEX E/M VISIT ADD ON: CPT

## 2024-07-18 ENCOUNTER — APPOINTMENT (OUTPATIENT)
Dept: HOME HEALTH SERVICES | Facility: HOME HEALTH | Age: 74
End: 2024-07-18
Payer: MEDICARE

## 2024-07-18 VITALS
DIASTOLIC BLOOD PRESSURE: 70 MMHG | RESPIRATION RATE: 16 BRPM | SYSTOLIC BLOOD PRESSURE: 138 MMHG | OXYGEN SATURATION: 97 % | HEART RATE: 94 BPM

## 2024-07-18 DIAGNOSIS — I25.10 ATHEROSCLEROTIC HEART DISEASE OF NATIVE CORONARY ARTERY W/OUT ANGINA PECTORIS: ICD-10-CM

## 2024-07-18 DIAGNOSIS — A53.0 LATENT SYPHILIS, UNSPECIFIED AS EARLY OR LATE: ICD-10-CM

## 2024-07-18 DIAGNOSIS — G47.33 OBSTRUCTIVE SLEEP APNEA (ADULT) (PEDIATRIC): ICD-10-CM

## 2024-07-18 DIAGNOSIS — R41.3 OTHER AMNESIA: ICD-10-CM

## 2024-07-18 DIAGNOSIS — E78.5 HYPERLIPIDEMIA, UNSPECIFIED: ICD-10-CM

## 2024-07-18 DIAGNOSIS — J45.909 UNSPECIFIED ASTHMA, UNCOMPLICATED: ICD-10-CM

## 2024-07-18 DIAGNOSIS — I10 ESSENTIAL (PRIMARY) HYPERTENSION: ICD-10-CM

## 2024-07-18 PROCEDURE — 99344 HOME/RES VST NEW MOD MDM 60: CPT

## 2024-07-18 RX ORDER — ACETAMINOPHEN 500 MG/1
500 TABLET, COATED ORAL TWICE DAILY
Refills: 0 | Status: ACTIVE | COMMUNITY
Start: 2024-07-18

## 2024-07-18 RX ORDER — DICLOFENAC SODIUM 1% 10 MG/G
1 GEL TOPICAL
Refills: 0 | Status: ACTIVE | COMMUNITY
Start: 2024-07-18

## 2024-08-08 ENCOUNTER — APPOINTMENT (OUTPATIENT)
Dept: FAMILY MEDICINE | Facility: CLINIC | Age: 74
End: 2024-08-08

## 2024-08-08 PROCEDURE — 99213 OFFICE O/P EST LOW 20 MIN: CPT

## 2024-08-08 PROCEDURE — G2211 COMPLEX E/M VISIT ADD ON: CPT

## 2024-08-09 ENCOUNTER — APPOINTMENT (OUTPATIENT)
Dept: UROLOGY | Facility: CLINIC | Age: 74
End: 2024-08-09

## 2024-08-09 PROBLEM — Z01.818 PREOPERATIVE CLEARANCE: Status: RESOLVED | Noted: 2024-04-30 | Resolved: 2024-08-09

## 2024-08-09 PROBLEM — R31.29 MICROHEMATURIA: Status: RESOLVED | Noted: 2022-04-20 | Resolved: 2024-08-09

## 2024-08-09 PROBLEM — Z01.810 PRE-PROCEDURAL CARDIOVASCULAR EXAMINATION: Status: RESOLVED | Noted: 2024-05-21 | Resolved: 2024-08-09

## 2024-08-09 PROCEDURE — 99213 OFFICE O/P EST LOW 20 MIN: CPT

## 2024-08-09 NOTE — REASON FOR VISIT
[Follow-up Visit ___] : a follow-up visit  for [unfilled] [Formal Caregiver] : formal caregiver [Other: _____] : [unfilled]

## 2024-08-09 NOTE — HISTORY OF PRESENT ILLNESS
[FreeTextEntry1] : Follow up sore throat  [de-identified] : 73y/o female born in [facility] w/ history of stroke 2012 with residual speech abnormality and memory changes, syphilis s/p treatment (concern for neurosyphilis), history of former nicotine use, CAD with stent placement, CHEYANNE, pre-diabetes, hyperlipidemia presents with concern of widespread body pain. She reported experiencing significant pain throughout her body, including her arms, shoulders, and other areas. The pain has been persistent and limiting her ability to perform daily activities. Additionally, Shikha has been struggling with sleep disturbances, often waking up around 4 AM and unable to fall back asleep. Her [place] in [place] and she feels very anxious about his safety. She has tried melatonin supplements, taking up to two 10 mg tablets, but with limited success. Shikha's pain and sleep issues have been ongoing for an extended period, causing significant discomfort and impacting her quality of life.

## 2024-08-09 NOTE — HISTORY OF PRESENT ILLNESS
[FreeTextEntry1] : Follow up sore throat  [de-identified] : 75y/o female born in [facility] w/ history of stroke 2012 with residual speech abnormality and memory changes, syphilis s/p treatment (concern for neurosyphilis), history of former nicotine use, CAD with stent placement, CHEYANNE, pre-diabetes, hyperlipidemia presents with concern of widespread body pain. She reported experiencing significant pain throughout her body, including her arms, shoulders, and other areas. The pain has been persistent and limiting her ability to perform daily activities. Additionally, Shikha has been struggling with sleep disturbances, often waking up around 4 AM and unable to fall back asleep. Her son is in [place] country and she feels very anxious about his safety. She has tried melatonin supplements, taking up to two 10 mg tablets, but with limited success. Shikha's pain and sleep issues have been ongoing for an extended period, causing significant discomfort and impacting her quality of life.

## 2024-08-09 NOTE — ASSESSMENT
[FreeTextEntry1] : 75 y/o female good control with Solfinecin 5 mg.  Bladder volume 93 ml with no Urge.   will follow-up in  1 year  or sooner.  Refills sent.

## 2024-08-09 NOTE — REVIEW OF SYSTEMS
[Fatigue] : fatigue [Joint Pain] : joint pain [Negative] : Respiratory [FreeTextEntry9] : shoulder and knee pain

## 2024-08-09 NOTE — HISTORY OF PRESENT ILLNESS
[FreeTextEntry1] : JESSE BLACK  74 year F presents for follow up on OAB. Doing well on Vesicare 5 mg. Content has good QOL. Accompanied by aide Neuro unable to spinal tap to evaluate Brain Syphilis because cardiologist will not allow cessation Plavix.   Denies hematuria or dysuria Last void prior to leaving home

## 2024-08-28 ENCOUNTER — APPOINTMENT (OUTPATIENT)
Dept: PULMONOLOGY | Facility: CLINIC | Age: 74
End: 2024-08-28
Payer: MEDICARE

## 2024-08-28 VITALS
HEART RATE: 82 BPM | TEMPERATURE: 97.4 F | SYSTOLIC BLOOD PRESSURE: 122 MMHG | HEIGHT: 62 IN | OXYGEN SATURATION: 96 % | DIASTOLIC BLOOD PRESSURE: 78 MMHG | BODY MASS INDEX: 27.6 KG/M2 | WEIGHT: 150 LBS | RESPIRATION RATE: 16 BRPM

## 2024-08-28 DIAGNOSIS — J45.901 UNSPECIFIED ASTHMA WITH (ACUTE) EXACERBATION: ICD-10-CM

## 2024-08-28 DIAGNOSIS — Z71.89 OTHER SPECIFIED COUNSELING: ICD-10-CM

## 2024-08-28 PROCEDURE — G2211 COMPLEX E/M VISIT ADD ON: CPT

## 2024-08-28 PROCEDURE — 99213 OFFICE O/P EST LOW 20 MIN: CPT

## 2024-08-28 NOTE — PROCEDURE
[FreeTextEntry1] : PROCEDURE DATE: 04/27/2023  INTERPRETATION: Chest radiographs CPT 54838  CLINICAL INFORMATION: Ribs of breath of unknown severity x1 week.  TECHNIQUE: Frontal and lateral views of the chest were obtained.  FINDINGS: Prior study dated 10/17/2022 was available for review.  The lungs are clear. No pleural abnormality is seen.  The heart and mediastinum appear intact.   IMPRESSION: No evidence of active chest disease.  --- End of Report ---      MILAGRO ESPINOZA MD; Attending Radiologist This document has been electronically signed. Apr 27 2023 4:15PM

## 2024-08-28 NOTE — ASSESSMENT
[FreeTextEntry1] : 73y/o female from [facility]  1- asthma - trigger infection allergies GERD  improved  2-  h/o   CAD/ stent normal ECHO 3-  10/23  moderate  CHEYANNE  -       poor compliance  4- h/o stroke with early memory changes mouth movement at risk for aspiration 5- EGD_ 4/2023 grade A esophagitis compatible reflux esophagitis non- erosive gastritis 6- vaccinations covid ( covid x three ) flu  Recommendations 1-  autopap   4- 16 cm   water    discussed  to use  longer  - given   nasal pillows today  to try   discussion  2- budesonide q 12 3- albuterol neb q 8 hour prn - to try 1/2 dose due to fatigue  4- albuterol MDI prn      discussion on  cpap compliance

## 2024-08-28 NOTE — PROCEDURE
[FreeTextEntry1] : PROCEDURE DATE: 04/27/2023  INTERPRETATION: Chest radiographs CPT 36639  CLINICAL INFORMATION: Ribs of breath of unknown severity x1 week.  TECHNIQUE: Frontal and lateral views of the chest were obtained.  FINDINGS: Prior study dated 10/17/2022 was available for review.  The lungs are clear. No pleural abnormality is seen.  The heart and mediastinum appear intact.   IMPRESSION: No evidence of active chest disease.  --- End of Report ---      MILAGRO ESPINOZA MD; Attending Radiologist This document has been electronically signed. Apr 27 2023 4:15PM

## 2024-08-28 NOTE — HISTORY OF PRESENT ILLNESS
[Former] : former [< 20 pack-years] : < 20 pack-years [Never] : never [Obstructive Sleep Apnea] : obstructive sleep apnea [Awakes Unrefreshed] : awakes unrefreshed [Awakes with Dry Mouth] : awakes with dry mouth [Awakes with Headache] : awakes with headache [Daytime Somnolence] : daytime somnolence [Difficulty Initiating Sleep] : difficulty initiating sleep [Fatigue] : fatigue [Snoring] : snoring [Home] : home [APAP:] : APAP [Nasal mask] : nasal mask [TextBox_4] : 9/21/2023  Aid here   Ms Pastrana   72y/o female born in [facility]  ex smoker (   started 30's-  up to   x six year   one pack /week)   work hx  - HHA  in past  ? stroke 2012 with  speech  delay and memory changes - h/o  CAD with stent- recent cath 2021  normal LV function / normal   h/o CHEYANNE  needs new sleep machine          here sob  - 2-3   blocks   SALE[facility]  recent  - dementia  followed by Neurologist  - EGD gastritis/ esophagitis  -  5/2023  -covid x  3   developed  asthma told  per aid   albuterol MDI   10/25/2023 72y/o female  ex smoker asthma    CHEYANNE - discussed sleep study and options    sleep hygeine - recieved nebulzier  at times feels tired with it howevermuch better now - no cough no sob -   11/22/2023 72y/o female   ex smoker asthma  CHEYANNE  -   doing  well on nebulizers - did not  get autopap yet  still waiting   12/26/2023  aid   translating  Bengali  72y/o  female ex smoker   asthma   CHEYANNE   f/u  - doin g well  with  mask  gets up to go to bathroom  -feels more refreshed more alert      less sleepiness -compliance reviewed with patient doing well  now   -  1/17/2024 72y/o female ex smoker  asthma   CHEYANNE  - when   cpap was  working felt much better - mask   is issue today reviewed use - no w - 2/28/2024 72y/o  female ex smoker  asthma CHEYANNE =currently  tolerated  cpap  - no wheezing  -   8/28/2024   aid translating  75 y/o female    ex smoker asthma cheyanne - sleep compliance  reviewed  with pt  poor compliance  disucussed - doing  well on  prn   nebs -  [TextBox_11] : 1/7 [TextBox_13] : 6 [TextBox_77] : 10 [TextBox_79] : 6 [TextBox_83] : 1 [TextBox_100] : 10/18/2023 [TextBox_108] : 26.5  [TextBox_112] : 0.7 [TextBox_116] : 65 [TextBox_125] : 4-16 cm water        median  5   max 9.5    [TextBox_152] : max Pressure 10      air leak  varies  [TextBox_127] : 1/24 [TextBox_129] : 2/2024 [TextBox_133] : 97% [TextBox_137] : 20% [TextBox_141] : 2 [TextBox_143] : 47 [TextBox_147] : 2.3  [TextBox_158] : community    [TextBox_160] : resmed  [TextBox_162] : 10/23 [TextBox_165] : improved mask now    nasal     [ESS] : 10

## 2024-08-28 NOTE — HISTORY OF PRESENT ILLNESS
[Former] : former [< 20 pack-years] : < 20 pack-years [Never] : never [Obstructive Sleep Apnea] : obstructive sleep apnea [Awakes Unrefreshed] : awakes unrefreshed [Awakes with Dry Mouth] : awakes with dry mouth [Awakes with Headache] : awakes with headache [Daytime Somnolence] : daytime somnolence [Difficulty Initiating Sleep] : difficulty initiating sleep [Fatigue] : fatigue [Snoring] : snoring [Home] : home [APAP:] : APAP [Nasal mask] : nasal mask [TextBox_4] : 9/21/2023  Aid here   Ms Pastrana   74y/o female born in [facility]  ex smoker (   started 30's-  up to   x six year   one pack /week)   work hx  - HHA  in past  ? stroke 2012 with  speech  delay and memory changes - h/o  CAD with stent- recent cath 2021  normal LV function / normal   h/o CHEYANNE  needs new sleep machine          here sob  - 2-3   blocks   SALEH  recent  - dementia  followed by Neurologist  - EGD gastritis/ esophagitis  -  5/2023  -covid x  3   developed  asthma told  per aid   albuterol MDI   10/25/2023 74y/o female  ex smoker asthma    CHEYANNE - discussed sleep study and options    sleep hygeine - recieved nebulzier  at times feels tired with it howevermuch better now - no cough no sob -   11/22/2023 74y/o female   ex smoker asthma  CHEYANNE  -   doing  well on nebulizers - did not  get autopap yet  still waiting   12/26/2023  aid   translating  Japanese  74y/o  female ex smoker   asthma   CHEYANNE   f/u  - doin g well  with  mask  gets up to go to bathroom  -feels more refreshed more alert      less sleepiness -compliance reviewed with patient doing well  now   -  1/17/2024 74y/o female ex smoker  asthma   CHEYANNE  - when   cpap was  working felt much better - mask   is issue today reviewed use - no w - 2/28/2024 74y/o  female ex smoker  asthma CHEYANNE =currently  tolerated  cpap  - no wheezing  -   8/28/2024   aid translating  75 y/o female    ex smoker asthma cheyanne - sleep compliance  reviewed  with pt  poor compliance  disucussed - doing  well on  prn   nebs -  [TextBox_11] : 1/7 [TextBox_13] : 6 [TextBox_77] : 10 [TextBox_79] : 6 [TextBox_83] : 1 [TextBox_100] : 10/18/2023 [TextBox_108] : 26.5  [TextBox_112] : 0.7 [TextBox_116] : 65 [TextBox_125] : 4-16 cm water        median  5   max 9.5    [TextBox_152] : max Pressure 10      air leak  varies  [TextBox_127] : 1/24 [TextBox_129] : 2/2024 [TextBox_133] : 97% [TextBox_137] : 20% [TextBox_141] : 2 [TextBox_143] : 47 [TextBox_147] : 2.3  [TextBox_158] : community    [TextBox_160] : resmed  [TextBox_162] : 10/23 [TextBox_165] : improved mask now    nasal     [ESS] : 10

## 2024-08-28 NOTE — PHYSICAL EXAM
[IV] : Mallampati Class: IV [Normal Appearance] : normal appearance [Supple] : supple [No Neck Mass] : no neck mass [No JVD] : no jvd [Normal Rate/Rhythm] : normal rate/rhythm [Normal S1, S2] : normal s1, s2 [No Murmurs] : no murmurs [No Resp Distress] : no resp distress [No Acc Muscle Use] : no acc muscle use [Clear to Auscultation Bilaterally] : clear to auscultation bilaterally [Benign] : benign [Not Tender] : not tender [No Masses] : no masses [Soft] : soft [Normal Bowel Sounds] : normal bowel sounds [Normal Gait] : normal gait [No Clubbing] : no clubbing [No Edema] : no edema [No Rash] : no rash [No Motor Deficits] : no motor deficits [Normal Affect] : normal affect [TextBox_2] : pleasant f no distress  nocough   speaking -  dysarthria mild [TextBox_11] : no lesion mild   dysp

## 2024-08-28 NOTE — ASSESSMENT
[FreeTextEntry1] : 73y/o female from [facility]  1- asthma - trigger infection allergies GERD  improved  2-  h/o   CAD/ stent normal ECHO 3-  10/23  moderate  CHEYANNE  -       poor compliance  4- h/o stroke with early memory changes mouth movement at risk for aspiration 5- EGD_ 4/2023 grade A esophagitis compatible reflux esophagitis non- erosive gastritis 6- vaccinations covid ( covid x three ) flu  Recommendations 1-  autopap   4- 16 cm   water    discussed  to use  longer  - given   nasal pillows today  to try   discussion  2- budesonide q 12 3- albuterol neb q 8 hour prn - to try 1/2 dose due to fatigue  4- albuterol MDI prn      discussion on  cpap compliance

## 2024-08-28 NOTE — REASON FOR VISIT
[Follow-Up] : a follow-up visit [Asthma] : asthma [Sleep Apnea] : sleep apnea [Interpreters_FullName] : AID    wants to translate Swedish

## 2024-08-28 NOTE — REASON FOR VISIT
[Follow-Up] : a follow-up visit [Asthma] : asthma [Sleep Apnea] : sleep apnea [Interpreters_FullName] : AID    wants to translate Belarusian

## 2024-08-29 ENCOUNTER — APPOINTMENT (OUTPATIENT)
Dept: RHEUMATOLOGY | Facility: CLINIC | Age: 74
End: 2024-08-29
Payer: MEDICARE

## 2024-08-29 VITALS
HEART RATE: 68 BPM | DIASTOLIC BLOOD PRESSURE: 70 MMHG | HEIGHT: 62 IN | TEMPERATURE: 97.6 F | BODY MASS INDEX: 29.08 KG/M2 | OXYGEN SATURATION: 99 % | WEIGHT: 158 LBS | SYSTOLIC BLOOD PRESSURE: 128 MMHG | RESPIRATION RATE: 16 BRPM

## 2024-08-29 DIAGNOSIS — R76.8 OTHER SPECIFIED ABNORMAL IMMUNOLOGICAL FINDINGS IN SERUM: ICD-10-CM

## 2024-08-29 DIAGNOSIS — M19.041 PRIMARY OSTEOARTHRITIS, RIGHT HAND: ICD-10-CM

## 2024-08-29 DIAGNOSIS — M19.042 PRIMARY OSTEOARTHRITIS, RIGHT HAND: ICD-10-CM

## 2024-08-29 DIAGNOSIS — M13.0 POLYARTHRITIS, UNSPECIFIED: ICD-10-CM

## 2024-08-29 PROCEDURE — 99214 OFFICE O/P EST MOD 30 MIN: CPT

## 2024-08-29 PROCEDURE — G2211 COMPLEX E/M VISIT ADD ON: CPT

## 2024-08-29 NOTE — HISTORY OF PRESENT ILLNESS
[FreeTextEntry1] :  # [id number]  JESSE BLACK is a 74 year old woman who presents with diffuse arthralgias, progressive over the last 2 years, no synovitis, no clear trigger of symptoms. Tylenol partially helps BID, occasional GI upset. Using topicals with partial benefit.   Inflammatory arthritis ROS negative for symmetrical peripheral joint synovitis, prolonged AM stiffness, enthesitis, dactylitis, psoriasis/ rashes, eye inflammation, inflammatory low back pain, IBD.   SLE ROS negative for focal alopecia, sicca, salivary gland swelling, oral ulcers, malar rash, photosensitivity, serositis, abd pain, dysuria/ hematuria, hematologic abnormalities, Raynauds.   Remote labs 2022 -- ZOË 1:320 (DFS 70), ESR/CRP elevated  Previously saw Dr Batista remotely - ?RA dx per pt report but not given meds  Negative FH for autoimmune d/o  CVA in 2012, remote hx of syphilis in 1986 - thinks she was treated, PMD is pursuing LP upcoming due to chronic facial dyskinesia movements, memory issues, and + labs   ---------- 8/29/24 --  # [id number]. Ongoing hand pain, mainly over bony nodules when enlarging, ADLs intact. Never got LP as cardiology wants to speak to procedure provider but unclear who that is/will be. Remainder of inflammatory ROS negative. Never tried Tylenol or topicals as discussed at last visit.

## 2024-08-29 NOTE — ASSESSMENT
[FreeTextEntry1] : JESSE BLACK is a 74 year old woman with below --   # hx of chronic arthralgias, reports remote hx of RA but never treated - currently no inflammatory arthritis or CTD sx by history or exam and XRs also without characteristic changes, + OA in DIPs >PIPs with pain  # + ZOË but in pattern least associated with CTD development, ESR which is nonspecific. Extensive specific serological w/u negative  # suspect component of FMS based on exam today  -  reviewed labs and imaging in detail with patient, all questions answered - no evidence of inflammatory process - ZOË and ESR prior + reviewed with her. Discussed that ZOË can be seen in 10-15% of normal population, + Ab incidence increases with age and with presence of other family members with hx of autoimmune dz or Ab positivity. Also discussed that ZOË alone does not confer a diagnosis and that presently she does not meet criteria for SLE. - reviewed nature of OA, prognosis, and that minimal med options to limit progression but can consider pain control meds if ADLs become limited 2/2 pain - conservative measures for OA reviewed with her -- moist heat, paraffin wax baths, massage - reviewed home exercises to maintain strength and ROM with her  - resume tylenol BID and try Voltaren gel   All questions and concerns addressed to my best possible ability, patient verbalizes understanding and is agreeable. RTC PRN

## 2024-10-24 ENCOUNTER — RX RENEWAL (OUTPATIENT)
Age: 74
End: 2024-10-24

## 2024-10-31 ENCOUNTER — APPOINTMENT (OUTPATIENT)
Dept: FAMILY MEDICINE | Facility: CLINIC | Age: 74
End: 2024-10-31

## 2024-10-31 VITALS
DIASTOLIC BLOOD PRESSURE: 69 MMHG | RESPIRATION RATE: 15 BRPM | TEMPERATURE: 98.7 F | HEART RATE: 66 BPM | SYSTOLIC BLOOD PRESSURE: 125 MMHG | OXYGEN SATURATION: 98 %

## 2024-10-31 DIAGNOSIS — Z12.39 ENCOUNTER FOR OTHER SCREENING FOR MALIGNANT NEOPLASM OF BREAST: ICD-10-CM

## 2024-10-31 DIAGNOSIS — M25.50 PAIN IN UNSPECIFIED JOINT: ICD-10-CM

## 2024-10-31 DIAGNOSIS — R47.9 UNSPECIFIED SPEECH DISTURBANCES: ICD-10-CM

## 2024-10-31 DIAGNOSIS — G24.4 IDIOPATHIC OROFACIAL DYSTONIA: ICD-10-CM

## 2024-10-31 DIAGNOSIS — M81.0 AGE-RELATED OSTEOPOROSIS W/OUT CURRENT PATHOLOGICAL FRACTURE: ICD-10-CM

## 2024-10-31 PROCEDURE — 99213 OFFICE O/P EST LOW 20 MIN: CPT

## 2024-10-31 PROCEDURE — G2211 COMPLEX E/M VISIT ADD ON: CPT

## 2024-11-11 ENCOUNTER — APPOINTMENT (OUTPATIENT)
Dept: RADIOLOGY | Facility: CLINIC | Age: 74
End: 2024-11-11

## 2024-11-11 ENCOUNTER — RESULT REVIEW (OUTPATIENT)
Age: 74
End: 2024-11-11

## 2024-11-11 ENCOUNTER — OUTPATIENT (OUTPATIENT)
Dept: OUTPATIENT SERVICES | Facility: HOSPITAL | Age: 74
LOS: 1 days | End: 2024-11-11
Payer: COMMERCIAL

## 2024-11-11 ENCOUNTER — APPOINTMENT (OUTPATIENT)
Dept: MAMMOGRAPHY | Facility: CLINIC | Age: 74
End: 2024-11-11

## 2024-11-11 DIAGNOSIS — M81.0 AGE-RELATED OSTEOPOROSIS WITHOUT CURRENT PATHOLOGICAL FRACTURE: ICD-10-CM

## 2024-11-11 DIAGNOSIS — Z95.5 PRESENCE OF CORONARY ANGIOPLASTY IMPLANT AND GRAFT: Chronic | ICD-10-CM

## 2024-11-11 DIAGNOSIS — Z98.891 HISTORY OF UTERINE SCAR FROM PREVIOUS SURGERY: Chronic | ICD-10-CM

## 2024-11-11 PROCEDURE — 77067 SCR MAMMO BI INCL CAD: CPT

## 2024-11-11 PROCEDURE — 77067 SCR MAMMO BI INCL CAD: CPT | Mod: 26

## 2024-11-11 PROCEDURE — 77080 DXA BONE DENSITY AXIAL: CPT

## 2024-11-11 PROCEDURE — 77080 DXA BONE DENSITY AXIAL: CPT | Mod: 26

## 2024-11-11 PROCEDURE — 77063 BREAST TOMOSYNTHESIS BI: CPT

## 2024-11-11 PROCEDURE — 77063 BREAST TOMOSYNTHESIS BI: CPT | Mod: 26

## 2024-12-02 ENCOUNTER — APPOINTMENT (OUTPATIENT)
Dept: HOME HEALTH SERVICES | Facility: HOME HEALTH | Age: 74
End: 2024-12-02
Payer: MEDICARE

## 2024-12-02 VITALS
SYSTOLIC BLOOD PRESSURE: 120 MMHG | OXYGEN SATURATION: 98 % | HEART RATE: 72 BPM | DIASTOLIC BLOOD PRESSURE: 60 MMHG | TEMPERATURE: 97.7 F

## 2024-12-02 DIAGNOSIS — F33.9 MAJOR DEPRESSIVE DISORDER, RECURRENT, UNSPECIFIED: ICD-10-CM

## 2024-12-02 DIAGNOSIS — Z86.79 PERSONAL HISTORY OF OTHER DISEASES OF THE CIRCULATORY SYSTEM: ICD-10-CM

## 2024-12-02 DIAGNOSIS — I69.322 DYSARTHRIA FOLLOWING CEREBRAL INFARCTION: ICD-10-CM

## 2024-12-02 DIAGNOSIS — G24.01 DRUG INDUCED SUBACUTE DYSKINESIA: ICD-10-CM

## 2024-12-02 PROCEDURE — 99348 HOME/RES VST EST LOW MDM 30: CPT | Mod: 25

## 2024-12-02 PROCEDURE — 99497 ADVNCD CARE PLAN 30 MIN: CPT

## 2024-12-08 PROBLEM — Z86.79 HISTORY OF PAROXYSMAL SUPRAVENTRICULAR TACHYCARDIA: Status: RESOLVED | Noted: 2023-03-08 | Resolved: 2024-12-02

## 2024-12-08 PROBLEM — I69.322 DYSARTHRIA DUE TO OLD STROKE: Status: ACTIVE | Noted: 2024-12-08

## 2024-12-08 PROBLEM — F33.9 DEPRESSION, RECURRENT: Status: ACTIVE | Noted: 2024-12-02

## 2024-12-24 PROBLEM — F10.90 ALCOHOL USE: Status: INACTIVE | Noted: 2020-12-02

## 2025-01-10 NOTE — DISCHARGE NOTE ADULT - HOME CARE AGENCY
Care Transitions Initial Follow Up Call    Call within 2 business days of discharge: Yes    Patient Current Location:  Home: [address] [place] [address]    Care Transition Nurse contacted the spouse/partner by telephone to perform post hospital discharge assessment. Verified name and  with spouse/partner as identifiers. Provided introduction to self, and explanation of the Care Transition Nurse role.     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 ED ([facility]) [facility] Jeff Boles, DO; Alfonso Ocampo,...            Was this an external facility discharge? Yes, 25  Discharge Facility: [facility]    Challenges to be reviewed by the provider   Additional needs identified to be addressed with provider: Yes  medications-changes               Method of communication with provider: chart routing.    wife states that Aquilino is somewhat weak but is doing ok.  BP has been slightly low at times but generally his normal after med adjustments at hospital.  He is eating and drinking ok but following a 2 liter per day fluid restriction.  We discussed his HFU appointment.     Care Transition Nurse reviewed discharge instructions with spouse/partner who verbalized understanding. The spouse/partner was given an opportunity to ask questions and does not have any further questions or concerns at this time. Were discharge instructions available to patient? Yes. Reviewed appropriate site of care based on symptoms and resources available to patient including: PCP  Specialist. The spouse/partner agrees to contact the PCP office for questions related to their healthcare.     [facility]  [facility]ly [facility] (5D/5H) [facility] ([facility]; [phone])  @ [phone]; RESUMPTION OF SERVICES FOR TODAY.

## 2025-01-14 ENCOUNTER — APPOINTMENT (OUTPATIENT)
Dept: NEUROLOGY | Facility: CLINIC | Age: 75
End: 2025-01-14

## 2025-02-13 ENCOUNTER — APPOINTMENT (OUTPATIENT)
Dept: NEUROLOGY | Facility: CLINIC | Age: 75
End: 2025-02-13
Payer: MEDICARE

## 2025-02-13 VITALS
SYSTOLIC BLOOD PRESSURE: 165 MMHG | WEIGHT: 160 LBS | HEART RATE: 79 BPM | DIASTOLIC BLOOD PRESSURE: 80 MMHG | BODY MASS INDEX: 29.26 KG/M2

## 2025-02-13 DIAGNOSIS — G24.01 DRUG INDUCED SUBACUTE DYSKINESIA: ICD-10-CM

## 2025-02-13 DIAGNOSIS — R26.81 UNSTEADINESS ON FEET: ICD-10-CM

## 2025-02-13 DIAGNOSIS — G47.33 OBSTRUCTIVE SLEEP APNEA (ADULT) (PEDIATRIC): ICD-10-CM

## 2025-02-13 DIAGNOSIS — R41.89 OTHER SYMPTOMS AND SIGNS INVOLVING COGNITIVE FUNCTIONS AND AWARENESS: ICD-10-CM

## 2025-02-13 PROCEDURE — 99205 OFFICE O/P NEW HI 60 MIN: CPT

## 2025-02-13 PROCEDURE — G2211 COMPLEX E/M VISIT ADD ON: CPT

## 2025-03-04 ENCOUNTER — APPOINTMENT (OUTPATIENT)
Dept: FAMILY MEDICINE | Facility: CLINIC | Age: 75
End: 2025-03-04
Payer: MEDICARE

## 2025-03-04 ENCOUNTER — APPOINTMENT (OUTPATIENT)
Dept: PULMONOLOGY | Facility: CLINIC | Age: 75
End: 2025-03-04

## 2025-03-04 VITALS
DIASTOLIC BLOOD PRESSURE: 72 MMHG | WEIGHT: 159 LBS | OXYGEN SATURATION: 96 % | HEIGHT: 62 IN | RESPIRATION RATE: 16 BRPM | TEMPERATURE: 97.6 F | SYSTOLIC BLOOD PRESSURE: 137 MMHG | BODY MASS INDEX: 29.26 KG/M2 | HEART RATE: 71 BPM

## 2025-03-04 DIAGNOSIS — K22.70 BARRETT'S ESOPHAGUS W/OUT DYSPLASIA: ICD-10-CM

## 2025-03-04 DIAGNOSIS — G24.01 DRUG INDUCED SUBACUTE DYSKINESIA: ICD-10-CM

## 2025-03-04 DIAGNOSIS — M25.50 PAIN IN UNSPECIFIED JOINT: ICD-10-CM

## 2025-03-04 DIAGNOSIS — G89.4 CHRONIC PAIN SYNDROME: ICD-10-CM

## 2025-03-04 DIAGNOSIS — R41.3 OTHER AMNESIA: ICD-10-CM

## 2025-03-04 DIAGNOSIS — M79.641 PAIN IN RIGHT HAND: ICD-10-CM

## 2025-03-04 DIAGNOSIS — R76.8 OTHER SPECIFIED ABNORMAL IMMUNOLOGICAL FINDINGS IN SERUM: ICD-10-CM

## 2025-03-04 DIAGNOSIS — M25.569 PAIN IN UNSPECIFIED KNEE: ICD-10-CM

## 2025-03-04 DIAGNOSIS — A53.9 SYPHILIS, UNSPECIFIED: ICD-10-CM

## 2025-03-04 DIAGNOSIS — G89.29 PAIN IN UNSPECIFIED KNEE: ICD-10-CM

## 2025-03-04 DIAGNOSIS — M79.642 PAIN IN RIGHT HAND: ICD-10-CM

## 2025-03-04 DIAGNOSIS — I10 ESSENTIAL (PRIMARY) HYPERTENSION: ICD-10-CM

## 2025-03-04 DIAGNOSIS — E78.5 HYPERLIPIDEMIA, UNSPECIFIED: ICD-10-CM

## 2025-03-04 DIAGNOSIS — R41.89 OTHER SYMPTOMS AND SIGNS INVOLVING COGNITIVE FUNCTIONS AND AWARENESS: ICD-10-CM

## 2025-03-04 PROCEDURE — G2211 COMPLEX E/M VISIT ADD ON: CPT

## 2025-03-04 PROCEDURE — 99215 OFFICE O/P EST HI 40 MIN: CPT

## 2025-03-19 ENCOUNTER — APPOINTMENT (OUTPATIENT)
Dept: INFECTIOUS DISEASE | Facility: CLINIC | Age: 75
End: 2025-03-19
Payer: MEDICARE

## 2025-03-19 VITALS
HEIGHT: 62 IN | DIASTOLIC BLOOD PRESSURE: 77 MMHG | HEART RATE: 71 BPM | BODY MASS INDEX: 29.44 KG/M2 | WEIGHT: 160 LBS | SYSTOLIC BLOOD PRESSURE: 146 MMHG | OXYGEN SATURATION: 97 % | TEMPERATURE: 98.4 F

## 2025-03-19 DIAGNOSIS — A53.0 LATENT SYPHILIS, UNSPECIFIED AS EARLY OR LATE: ICD-10-CM

## 2025-03-19 DIAGNOSIS — G24.4 IDIOPATHIC OROFACIAL DYSTONIA: ICD-10-CM

## 2025-03-19 DIAGNOSIS — R41.89 OTHER SYMPTOMS AND SIGNS INVOLVING COGNITIVE FUNCTIONS AND AWARENESS: ICD-10-CM

## 2025-03-19 PROCEDURE — 99212 OFFICE O/P EST SF 10 MIN: CPT

## 2025-03-21 LAB
ALBUMIN SERPL ELPH-MCNC: 4.1 G/DL
ALP BLD-CCNC: 68 U/L
ALT SERPL-CCNC: 9 U/L
ANION GAP SERPL CALC-SCNC: 12 MMOL/L
APTT BLD: 33.3 SEC
AST SERPL-CCNC: 15 U/L
BASOPHILS # BLD AUTO: 0.02 K/UL
BASOPHILS NFR BLD AUTO: 0.3 %
BILIRUB SERPL-MCNC: 0.4 MG/DL
BUN SERPL-MCNC: 18 MG/DL
CALCIUM SERPL-MCNC: 9.5 MG/DL
CHLORIDE SERPL-SCNC: 101 MMOL/L
CO2 SERPL-SCNC: 25 MMOL/L
CREAT SERPL-MCNC: 0.6 MG/DL
EGFRCR SERPLBLD CKD-EPI 2021: 95 ML/MIN/1.73M2
EOSINOPHIL # BLD AUTO: 0.07 K/UL
EOSINOPHIL NFR BLD AUTO: 1.1 %
GLUCOSE SERPL-MCNC: 102 MG/DL
HCT VFR BLD CALC: 37.1 %
HGB BLD-MCNC: 12.1 G/DL
IMM GRANULOCYTES NFR BLD AUTO: 0.2 %
INR PPP: 0.99 RATIO
LYMPHOCYTES # BLD AUTO: 2.25 K/UL
LYMPHOCYTES NFR BLD AUTO: 35.5 %
MAN DIFF?: NORMAL
MCHC RBC-ENTMCNC: 30.3 PG
MCHC RBC-ENTMCNC: 32.6 G/DL
MCV RBC AUTO: 93 FL
MONOCYTES # BLD AUTO: 0.42 K/UL
MONOCYTES NFR BLD AUTO: 6.6 %
NEUTROPHILS # BLD AUTO: 3.56 K/UL
NEUTROPHILS NFR BLD AUTO: 56.3 %
PLATELET # BLD AUTO: 281 K/UL
POTASSIUM SERPL-SCNC: 3.8 MMOL/L
PROT SERPL-MCNC: 7.4 G/DL
PT BLD: 11.7 SEC
RBC # BLD: 3.99 M/UL
RBC # FLD: 14.5 %
SODIUM SERPL-SCNC: 138 MMOL/L
WBC # FLD AUTO: 6.33 K/UL

## 2025-03-25 ENCOUNTER — APPOINTMENT (OUTPATIENT)
Dept: ORTHOPEDIC SURGERY | Facility: CLINIC | Age: 75
End: 2025-03-25

## 2025-03-26 ENCOUNTER — APPOINTMENT (OUTPATIENT)
Dept: HOME HEALTH SERVICES | Facility: HOME HEALTH | Age: 75
End: 2025-03-26

## 2025-03-31 ENCOUNTER — APPOINTMENT (OUTPATIENT)
Dept: RADIOLOGY | Facility: HOSPITAL | Age: 75
End: 2025-03-31

## 2025-03-31 ENCOUNTER — OUTPATIENT (OUTPATIENT)
Dept: OUTPATIENT SERVICES | Facility: HOSPITAL | Age: 75
LOS: 1 days | End: 2025-03-31
Payer: MEDICARE

## 2025-03-31 ENCOUNTER — APPOINTMENT (OUTPATIENT)
Dept: RHEUMATOLOGY | Facility: CLINIC | Age: 75
End: 2025-03-31
Payer: MEDICARE

## 2025-03-31 ENCOUNTER — NON-APPOINTMENT (OUTPATIENT)
Age: 75
End: 2025-03-31

## 2025-03-31 ENCOUNTER — RESULT REVIEW (OUTPATIENT)
Age: 75
End: 2025-03-31

## 2025-03-31 VITALS
HEIGHT: 62 IN | OXYGEN SATURATION: 98 % | WEIGHT: 158 LBS | SYSTOLIC BLOOD PRESSURE: 138 MMHG | DIASTOLIC BLOOD PRESSURE: 72 MMHG | RESPIRATION RATE: 16 BRPM | BODY MASS INDEX: 29.08 KG/M2 | HEART RATE: 81 BPM | TEMPERATURE: 97.9 F

## 2025-03-31 DIAGNOSIS — M25.561 PAIN IN RIGHT KNEE: ICD-10-CM

## 2025-03-31 DIAGNOSIS — M25.512 PAIN IN RIGHT SHOULDER: ICD-10-CM

## 2025-03-31 DIAGNOSIS — M54.50 LOW BACK PAIN, UNSPECIFIED: ICD-10-CM

## 2025-03-31 DIAGNOSIS — M54.2 CERVICALGIA: ICD-10-CM

## 2025-03-31 DIAGNOSIS — G89.29 PAIN IN RIGHT SHOULDER: ICD-10-CM

## 2025-03-31 DIAGNOSIS — M25.562 PAIN IN RIGHT KNEE: ICD-10-CM

## 2025-03-31 DIAGNOSIS — R76.8 OTHER SPECIFIED ABNORMAL IMMUNOLOGICAL FINDINGS IN SERUM: ICD-10-CM

## 2025-03-31 DIAGNOSIS — M79.672 PAIN IN RIGHT FOOT: ICD-10-CM

## 2025-03-31 DIAGNOSIS — M19.042 PRIMARY OSTEOARTHRITIS, RIGHT HAND: ICD-10-CM

## 2025-03-31 DIAGNOSIS — G89.29 PAIN IN LEFT KNEE: ICD-10-CM

## 2025-03-31 DIAGNOSIS — G89.29 PAIN IN RIGHT KNEE: ICD-10-CM

## 2025-03-31 DIAGNOSIS — Z95.5 PRESENCE OF CORONARY ANGIOPLASTY IMPLANT AND GRAFT: Chronic | ICD-10-CM

## 2025-03-31 DIAGNOSIS — M19.041 PRIMARY OSTEOARTHRITIS, RIGHT HAND: ICD-10-CM

## 2025-03-31 DIAGNOSIS — G89.29 LOW BACK PAIN, UNSPECIFIED: ICD-10-CM

## 2025-03-31 DIAGNOSIS — Z98.891 HISTORY OF UTERINE SCAR FROM PREVIOUS SURGERY: Chronic | ICD-10-CM

## 2025-03-31 DIAGNOSIS — M25.562 PAIN IN LEFT KNEE: ICD-10-CM

## 2025-03-31 DIAGNOSIS — M79.671 PAIN IN RIGHT FOOT: ICD-10-CM

## 2025-03-31 DIAGNOSIS — M25.511 PAIN IN RIGHT SHOULDER: ICD-10-CM

## 2025-03-31 PROCEDURE — 73630 X-RAY EXAM OF FOOT: CPT

## 2025-03-31 PROCEDURE — 73110 X-RAY EXAM OF WRIST: CPT | Mod: 26,LT,RT

## 2025-03-31 PROCEDURE — 72040 X-RAY EXAM NECK SPINE 2-3 VW: CPT

## 2025-03-31 PROCEDURE — 73110 X-RAY EXAM OF WRIST: CPT

## 2025-03-31 PROCEDURE — 73630 X-RAY EXAM OF FOOT: CPT | Mod: 26,LT,RT

## 2025-03-31 PROCEDURE — 72040 X-RAY EXAM NECK SPINE 2-3 VW: CPT | Mod: 26

## 2025-03-31 PROCEDURE — 72110 X-RAY EXAM L-2 SPINE 4/>VWS: CPT

## 2025-03-31 PROCEDURE — 73130 X-RAY EXAM OF HAND: CPT | Mod: 26,LT,RT

## 2025-03-31 PROCEDURE — G2211 COMPLEX E/M VISIT ADD ON: CPT

## 2025-03-31 PROCEDURE — 73562 X-RAY EXAM OF KNEE 3: CPT | Mod: 26,LT,RT

## 2025-03-31 PROCEDURE — 73562 X-RAY EXAM OF KNEE 3: CPT

## 2025-03-31 PROCEDURE — 73521 X-RAY EXAM HIPS BI 2 VIEWS: CPT

## 2025-03-31 PROCEDURE — 73130 X-RAY EXAM OF HAND: CPT

## 2025-03-31 PROCEDURE — 73030 X-RAY EXAM OF SHOULDER: CPT

## 2025-03-31 PROCEDURE — 73521 X-RAY EXAM HIPS BI 2 VIEWS: CPT | Mod: 26

## 2025-03-31 PROCEDURE — 72110 X-RAY EXAM L-2 SPINE 4/>VWS: CPT | Mod: 26

## 2025-03-31 PROCEDURE — 99214 OFFICE O/P EST MOD 30 MIN: CPT

## 2025-03-31 PROCEDURE — 73030 X-RAY EXAM OF SHOULDER: CPT | Mod: 26,LT,RT

## 2025-04-03 ENCOUNTER — APPOINTMENT (OUTPATIENT)
Dept: FAMILY MEDICINE | Facility: CLINIC | Age: 75
End: 2025-04-03

## 2025-04-03 ENCOUNTER — APPOINTMENT (OUTPATIENT)
Dept: HOME HEALTH SERVICES | Facility: HOME HEALTH | Age: 75
End: 2025-04-03
Payer: MEDICARE

## 2025-04-03 VITALS
SYSTOLIC BLOOD PRESSURE: 130 MMHG | OXYGEN SATURATION: 98 % | TEMPERATURE: 98.5 F | DIASTOLIC BLOOD PRESSURE: 70 MMHG | HEART RATE: 80 BPM

## 2025-04-03 DIAGNOSIS — A53.9 SYPHILIS, UNSPECIFIED: ICD-10-CM

## 2025-04-03 DIAGNOSIS — G24.01 DRUG INDUCED SUBACUTE DYSKINESIA: ICD-10-CM

## 2025-04-03 DIAGNOSIS — F33.9 MAJOR DEPRESSIVE DISORDER, RECURRENT, UNSPECIFIED: ICD-10-CM

## 2025-04-03 DIAGNOSIS — G89.4 CHRONIC PAIN SYNDROME: ICD-10-CM

## 2025-04-03 PROCEDURE — 99348 HOME/RES VST EST LOW MDM 30: CPT | Mod: 25

## 2025-04-16 ENCOUNTER — APPOINTMENT (OUTPATIENT)
Dept: NEUROLOGY | Facility: CLINIC | Age: 75
End: 2025-04-16

## 2025-04-16 ENCOUNTER — APPOINTMENT (OUTPATIENT)
Dept: GASTROENTEROLOGY | Facility: CLINIC | Age: 75
End: 2025-04-16

## 2025-04-21 ENCOUNTER — RX RENEWAL (OUTPATIENT)
Age: 75
End: 2025-04-21

## 2025-04-21 NOTE — ASSESSMENT
[FreeTextEntry1] : 72y/o female from [facility]  1- asthma - trigger infection allergies GERD  improved  3- CAD/ stent normal ECHO 3-  10/23  moderate  CHEYANNE  -    4- h/o stroke with early memory changes mouth movement at risk for aspiration 5- EGD_ 4/2023 grade A esophagitis compatible reflux esophagitis non- erosive gastritis 6- vaccinations covid ( covid x three ) flu  Recommendations 1-  autopap   4- 16 cm   water  - needs machine to be checked  will call  2- budesonide q 12 3- albuterol neb q 8 hour prn - to try 1/2 dose due to fatigue  4- albuterol MDI prn      to call vendor to check machine      return in  three months agreement o n plan   
Car

## 2025-04-24 ENCOUNTER — APPOINTMENT (OUTPATIENT)
Dept: NEUROLOGY | Facility: CLINIC | Age: 75
End: 2025-04-24
Payer: MEDICARE

## 2025-04-24 VITALS
WEIGHT: 158 LBS | HEIGHT: 62 IN | TEMPERATURE: 98.1 F | BODY MASS INDEX: 29.08 KG/M2 | OXYGEN SATURATION: 97 % | HEART RATE: 79 BPM | DIASTOLIC BLOOD PRESSURE: 75 MMHG | SYSTOLIC BLOOD PRESSURE: 132 MMHG

## 2025-04-24 VITALS
DIASTOLIC BLOOD PRESSURE: 75 MMHG | HEIGHT: 62 IN | HEART RATE: 79 BPM | SYSTOLIC BLOOD PRESSURE: 132 MMHG | WEIGHT: 158 LBS | OXYGEN SATURATION: 97 % | TEMPERATURE: 98.1 F | BODY MASS INDEX: 29.08 KG/M2

## 2025-04-24 DIAGNOSIS — R52 PAIN, UNSPECIFIED: ICD-10-CM

## 2025-04-24 DIAGNOSIS — A53.9 SYPHILIS, UNSPECIFIED: ICD-10-CM

## 2025-04-24 DIAGNOSIS — R41.89 OTHER SYMPTOMS AND SIGNS INVOLVING COGNITIVE FUNCTIONS AND AWARENESS: ICD-10-CM

## 2025-04-24 DIAGNOSIS — G89.29 PAIN, UNSPECIFIED: ICD-10-CM

## 2025-04-24 DIAGNOSIS — G24.01 DRUG INDUCED SUBACUTE DYSKINESIA: ICD-10-CM

## 2025-04-24 PROCEDURE — 99215 OFFICE O/P EST HI 40 MIN: CPT

## 2025-04-24 PROCEDURE — G2211 COMPLEX E/M VISIT ADD ON: CPT

## 2025-04-29 ENCOUNTER — NON-APPOINTMENT (OUTPATIENT)
Age: 75
End: 2025-04-29

## 2025-04-29 ENCOUNTER — EMERGENCY (EMERGENCY)
Facility: HOSPITAL | Age: 75
LOS: 1 days | End: 2025-04-29
Attending: EMERGENCY MEDICINE | Admitting: STUDENT IN AN ORGANIZED HEALTH CARE EDUCATION/TRAINING PROGRAM
Payer: MEDICARE

## 2025-04-29 VITALS
RESPIRATION RATE: 18 BRPM | OXYGEN SATURATION: 99 % | WEIGHT: 160.06 LBS | HEIGHT: 62 IN | TEMPERATURE: 98 F | DIASTOLIC BLOOD PRESSURE: 69 MMHG | SYSTOLIC BLOOD PRESSURE: 148 MMHG | HEART RATE: 72 BPM

## 2025-04-29 DIAGNOSIS — Z98.891 HISTORY OF UTERINE SCAR FROM PREVIOUS SURGERY: Chronic | ICD-10-CM

## 2025-04-29 DIAGNOSIS — Z95.5 PRESENCE OF CORONARY ANGIOPLASTY IMPLANT AND GRAFT: Chronic | ICD-10-CM

## 2025-04-29 PROCEDURE — 70450 CT HEAD/BRAIN W/O DYE: CPT | Mod: MC

## 2025-04-29 PROCEDURE — 70450 CT HEAD/BRAIN W/O DYE: CPT | Mod: 26

## 2025-04-29 PROCEDURE — 99284 EMERGENCY DEPT VISIT MOD MDM: CPT | Mod: 25

## 2025-04-29 PROCEDURE — 99284 EMERGENCY DEPT VISIT MOD MDM: CPT

## 2025-04-29 RX ORDER — ACETAMINOPHEN 500 MG/5ML
650 LIQUID (ML) ORAL ONCE
Refills: 0 | Status: COMPLETED | OUTPATIENT
Start: 2025-04-29 | End: 2025-04-29

## 2025-04-29 RX ADMIN — Medication 650 MILLIGRAM(S): at 15:30

## 2025-04-29 NOTE — ED PROVIDER NOTE - PATIENT PORTAL LINK FT
You can access the FollowMyHealth Patient Portal offered by [facility] by registering at the following website: [email or web address]. By joining [facility]’s FollowMyHealth portal, you will also be able to view your health information using other applications (apps) compatible with our system.

## 2025-04-29 NOTE — ED ADULT NURSE NOTE - OBJECTIVE STATEMENT
75 yr old female BIB aide from home for complaints of head injury  on Sunday. No LOC. Patient c/o pain to head, denies numbness, paresthesias, tingling, dizziness, nausea, vomiting. Comfort and safety provided.

## 2025-04-29 NOTE — ED ADULT TRIAGE NOTE - CHIEF COMPLAINT QUOTE
Patient presents to ED after being referred to by Urgent Care for complaint of fall on sunday and positive headstrike. Denies loss of consicousness. Currently having posterior head pain and right hip pain.  Developed nausea, dizziness x 1 week. Takes plavix.

## 2025-04-29 NOTE — ED ADULT NURSE NOTE - NSFALLHARMRISKINTERV_ED_ALL_ED
Assistance OOB with selected safe patient handling equipment if applicable/Communicate risk of Fall with Harm to all staff, patient, and family/Provide patient with walking aids/Provide visual cue: red socks, yellow wristband, yellow gown, etc/Reinforce activity limits and safety measures with patient and family/Bed in lowest position, wheels locked, appropriate side rails in place/Call bell, personal items and telephone in reach/Instruct patient to call for assistance before getting out of bed/chair/stretcher/Non-slip footwear applied when patient is off stretcher/[place] to call system/Physically safe environment - no spills, clutter or unnecessary equipment/Purposeful Proactive Rounding/Room/bathroom lighting operational, light cord in reach

## 2025-04-29 NOTE — ED PROVIDER NOTE - PHYSICAL EXAMINATION
ATTENDING PHYSICAL EXAM DR. GOETZ ***GEN - NAD; well appearing; A+O x3 ***HEAD - NC/AT ***EYES/NOSE - PERRL, EOMI, mucous membranes moist, no discharge ***THROAT: Oral cavity and pharynx normal. No inflammation, swelling, exudate, or lesions.  ***NECK: Neck supple, non-tender without lymphadenopathy, no masses, no thyromegaly.   ***PULMONARY - CTA b/l, symmetric breath sounds. ***CARDIAC -s1s2, RRR, no M,G,R  ***ABDOMEN - +BS, ND, NT, soft, no guarding, no rebound, no masses   ***BACK - no CVA tenderness, Normal  spine ***EXTREMITIES - symmetric pulses, 2+ dp, capillary refill < 2 seconds, no clubbing, no cyanosis, no edema ***SKIN - no rash , bruising overlying right hip however patient has full range of motion is able to bear weight here in the emergency department, right distal leg abrasion no malleoli or tenderness over right lateral malleolus no deformity no swelling over ankle  ***NEUROLOGIC - alert, CN 2-12 intact, reflexes nl, sensation nl, coordination nl, finger to nose nl, romberg negative, motor 5/5 RUE/LUE/RLE/LLE/EHL/Plantar flexion, no pronator drift, gait nl ATTENDING PHYSICAL EXAM DR. GOETZ ***GEN - NAD; well appearing; A+O x3 ***HEAD - NC/AT ***EYES/NOSE - PERRL, EOMI, mucous membranes moist, no discharge ***THROAT: Oral cavity and pharynx normal. No inflammation, swelling, exudate, or lesions.  ***NECK: Neck supple, non-tender without lymphadenopathy, no masses, no thyromegaly.   ***PULMONARY - CTA b/l, symmetric breath sounds. ***CARDIAC -s1s2, RRR, no M,G,R  ***ABDOMEN - +BS, ND, NT, soft, no guarding, no rebound, no masses   ***BACK - no CVA tenderness, Normal  spine ***EXTREMITIES - symmetric pulses, 2+ dp, capillary refill < 2 seconds, no clubbing, no cyanosis, no edema ***SKIN - no rash , superficial non tender bruising overlying right hip however patient has full range of motion is able to bear weight here in the emergency department, right distal leg abrasion no malleoli or tenderness over right lateral malleolus no deformity no swelling over ankle  ***NEUROLOGIC - alert, CN 2-12 intact, reflexes nl, sensation nl, coordination nl, finger to nose nl, romberg negative, motor 5/5 RUE/LUE/RLE/LLE/EHL/Plantar flexion, no pronator drift, gait nl

## 2025-04-29 NOTE — ED PROVIDER NOTE - CLINICAL SUMMARY MEDICAL DECISION MAKING FREE TEXT BOX
Medical Decision Making:  - The patient presents with a concerning history of head trauma while on antiplatelet therapy (Plavix). Given the mechanism of injury (fall on cement), the presence of a visible bruise on the head, and the complaint of persistent headache, there is a high suspicion for intracranial injury. The use of Plavix increases the risk of intracranial hemorrhage, which necessitates immediate imaging.    - I ordered a CT scan of the head to rule out intracranial bleeding or other traumatic brain injuries. While I suspect the headache may be due to a concussion, it's crucial to exclude more serious pathologies given the patient's medication history.    - The right ankle injury appears to be a minor abrasion without signs of fracture, as the patient can bear weight and walk without difficulty. There is no malleolar tenderness, further supporting the assessment of a superficial injury.  no suspicion for ankle fracture given patient is ambulatory without any difficulty.,  Hematoma is superficial appearing.    - I plan to administer Tylenol for pain relief once the CT results are back and confirm no contraindications.    - The patient's other medications (atorvastatin, citalopram, and possibly benazepril) suggest underlying cardiovascular and psychiatric conditions that need to be considered in the overall management.    - After obtaining the CT results, I will reassess the patient and determine the need for further interventions or safe discharge with appropriate follow-up instructions and return precautions.    Impression:  - Closed head injury    - Possible concussion    - Right ankle abrasion    - Rule out intracranial hemorrhage Medical Decision Making:  - The patient presents with a concerning history of head trauma while on antiplatelet therapy (Plavix). Given the mechanism of injury (fall on cement), the presence of a visible bruise on the head, and the complaint of persistent headache, there is a high suspicion for intracranial injury. The use of Plavix increases the risk of intracranial hemorrhage, which necessitates immediate imaging.    - I ordered a CT scan of the head to rule out intracranial bleeding or other traumatic brain injuries. While I suspect the headache may be due to a concussion, it's crucial to exclude more serious pathologies given the patient's medication history.    - The right ankle injury appears to be a minor abrasion without signs of fracture, as the patient can bear weight and walk without difficulty. There is no malleolar tenderness, further supporting the assessment of a superficial injury.  no suspicion for ankle fracture given patient is ambulatory without any difficulty.,  Hematoma is superficial At right hip without any limitation in movement.    - I plan to administer Tylenol for pain relief once the CT results are back and confirm no contraindications.    - The patient's other medications (atorvastatin, citalopram, and possibly benazepril) suggest underlying cardiovascular and psychiatric conditions that need to be considered in the overall management.    - After obtaining the CT results, I will reassess the patient and determine the need for further interventions or safe discharge with appropriate follow-up instructions and return precautions.    Impression:  - Closed head injury    - Possible concussion    - Right ankle abrasion    - Rule out intracranial hemorrhage

## 2025-04-29 NOTE — ED ADULT NURSE NOTE - CAS EDP DISCH TYPE
Diabetes mellitus type 2  Patient is here today for re-evaluation  He did have a blood sugar and hemoglobin A1c performed prior to the visit today and we did discuss the results  His hemoglobin A1c is 7 1 showing an perfect control  A major contributing factor as far as this is concerned as the fact patient is not watching his diet whatsoever  We did reinforce to the patient the importance of working on his diet, reduce his intake of concentrated sweets and simple carbohydrates and increase his exercise  Patient is accompanied by his wife who understands and agrees to motivate the patient for this    We will check a fasting blood sugar, hemoglobin A1c with his next visit in 4 months Home

## 2025-04-29 NOTE — ED PROVIDER NOTE - OBJECTIVE STATEMENT
Chief Complaint:  - Headache following a fall    HPI:  - The patient is an elderly female who presents to the Emergency Department with complaints of headache and body pain following a fall that occurred on Sunday. The patient fell outside on a cement surface, Due to losing her balance whilst ambulating, hitting head against cement and onto her right hip.  She is able to ambulate without any difficulty complaining of pain to the right hip and right ankle however is able to bear weight.  She denies any vomiting but has been complaining of headache.  No blurred vision no paresthesia no anesthesia no dizziness. resulting in a visible bruise. The headache started on the day of the fall. There is no report of loss of consciousness or vomiting. The patient delayed seeking medical attention until today. Of note, the patient is on Plavix, which raises concern for potential intracranial bleeding.    Past Medical History:  - On antiplatelet therapy (Plavix)

## 2025-04-29 NOTE — ED PROVIDER NOTE - NSFOLLOWUPINSTRUCTIONS_ED_ALL_ED_FT
Log Out.  [employer] Care[employer]®  :  [facility]        CONCUSSION - General Information    Concussion    WHAT YOU NEED TO KNOW:    What is a concussion? A concussion is a mild brain injury. It is usually caused by a bump or blow to the head from a fall, a motor vehicle crash, or a sports injury. Being shaken forcefully may also cause a concussion.    What are the signs and symptoms of a concussion? Symptoms may happen right away, or they may develop days after the concussion:    Headache    Dizziness, loss of balance, or blurry vision    Nausea or vomiting    A change in mood, such as restlessness or irritability    Trouble thinking, remembering things, or concentrating    Ringing in the ears    Drowsiness or decreased energy    Changes in your normal sleeping pattern  How is a concussion diagnosed? Your healthcare provider will examine you and ask about your symptoms. Tell your provider when and how you were injured. You may need any of the following:    A neurologic exam is also called neuro signs, neuro checks, or neuro status. A neurologic exam can show healthcare providers how well your brain works after your injury. Healthcare providers will check how your pupils react to light. They may check your memory and how easily you wake up. Your hand grasp and balance may also be tested.    CT or MRI pictures may be taken of your head. You may be given contrast liquid to help the pictures show up better. Tell the healthcare provider if you have ever had an allergic reaction to contrast liquid. Do not enter the MRI room with anything metal. Metal can cause serious injury. Tell the healthcare provider if you have any metal in or on your body.  How is a concussion managed? Usually no treatment is needed for a mild concussion. Concussion symptoms usually go away within 10 days, but they may last longer. The following may be recommended to manage your symptoms:    Rest from physical and mental activities as directed. Mental activities are those that require thinking, concentration, and attention. You will need to rest until your symptoms are gone. Rest will allow you to recover from your concussion. Ask your healthcare provider when you can return to work and other daily activities.    Have someone stay with you for the first 24 hours after your injury. Your healthcare provider should be contacted if your symptoms get worse, or you develop new symptoms.    Do not participate in sports and physical activities until your healthcare provider says it is okay. These can make your symptoms worse or lead to another concussion. Your healthcare provider will tell you when it is okay for you to return to sports or physical activities. Ask for more information about sports concussions.    Do not use heavy machinery or drive for 24 hours after your injury, or as directed. This can be dangerous and cause a serious accident. Your healthcare provider will tell you when it is safe for you to return to these activities.    Pain medicine may help relieve headache pain. Do not use NSAIDs or aspirin. These can increase your risk for bleeding. Your provider may recommend acetaminophen. Ask how much to take and how often to take it. Follow directions. Read the labels of all other medicines you are using to see if they also contain acetaminophen, or ask your doctor or pharmacist. Acetaminophen can cause liver damage if not taken correctly.  How can I help prevent another concussion?    Wear protective sports equipment that fits properly. Helmets help decrease your risk for a serious brain injury. Talk to your healthcare provider about ways you can lower your risk for a concussion if you play sports.    Wear your seatbelt every time you travel. This helps lower your risk for a head injury if you are in a car accident.  Where can I get more information?    [employer]  [address] [place], [place] [address]  Phone: [phone]  Phone: [phone]  Web Address: http://www.[employer]  Call your local emergency number (911 in the ), or have someone call if:    You cannot be woken.    You have a seizure, increasing confusion, or a change in personality.    Your speech becomes slurred.  When should I seek immediate care?    You have sudden or new vision problems.    One of your pupils is bigger than the other.    You have a severe headache that does not go away.    You have arm or leg weakness, numbness, or new problems with coordination.    You have blood or clear fluid coming out of the ears or nose.    You cannot stop vomiting.  When should I call my doctor?    You have nausea or are vomiting.    You feel more sleepy than usual.    Your symptoms get worse.    Your symptoms last longer than 6 weeks.    You have questions or concerns about your condition or care.  CARE AGREEMENT:    You have the right to help plan your care. Learn about your health condition and how it may be treated. Discuss treatment options with your healthcare providers to decide what care you want to receive. You always have the right to refuse treatment.    © [employer] 1973, 2025    	  back to top            © [employer] 1973, 2025

## 2025-04-30 ENCOUNTER — APPOINTMENT (OUTPATIENT)
Dept: HOME HEALTH SERVICES | Facility: HOME HEALTH | Age: 75
End: 2025-04-30

## 2025-04-30 ENCOUNTER — NON-APPOINTMENT (OUTPATIENT)
Age: 75
End: 2025-04-30

## 2025-05-01 ENCOUNTER — NON-APPOINTMENT (OUTPATIENT)
Age: 75
End: 2025-05-01

## 2025-05-07 ENCOUNTER — OUTPATIENT (OUTPATIENT)
Dept: OUTPATIENT SERVICES | Facility: HOSPITAL | Age: 75
LOS: 1 days | End: 2025-05-07
Payer: MEDICARE

## 2025-05-07 ENCOUNTER — NON-APPOINTMENT (OUTPATIENT)
Age: 75
End: 2025-05-07

## 2025-05-07 ENCOUNTER — APPOINTMENT (OUTPATIENT)
Dept: MRI IMAGING | Facility: HOSPITAL | Age: 75
End: 2025-05-07
Payer: MEDICARE

## 2025-05-07 DIAGNOSIS — A53.9 SYPHILIS, UNSPECIFIED: ICD-10-CM

## 2025-05-07 DIAGNOSIS — G24.01 DRUG INDUCED SUBACUTE DYSKINESIA: ICD-10-CM

## 2025-05-07 DIAGNOSIS — Z95.5 PRESENCE OF CORONARY ANGIOPLASTY IMPLANT AND GRAFT: Chronic | ICD-10-CM

## 2025-05-07 DIAGNOSIS — R41.89 OTHER SYMPTOMS AND SIGNS INVOLVING COGNITIVE FUNCTIONS AND AWARENESS: ICD-10-CM

## 2025-05-07 DIAGNOSIS — Z98.891 HISTORY OF UTERINE SCAR FROM PREVIOUS SURGERY: Chronic | ICD-10-CM

## 2025-05-07 PROCEDURE — 70551 MRI BRAIN STEM W/O DYE: CPT

## 2025-05-07 PROCEDURE — 70551 MRI BRAIN STEM W/O DYE: CPT | Mod: 26

## 2025-05-28 NOTE — ED ADULT NURSE NOTE - NS ED PATIENT SAFETY CONCERN
Rosa Parr  Emergency Medicine  Follow Up Time: 4-6 Days   Son Parr  Neurology  [address] [place] [address], [facility] [address]  [place] [address]  Phone: [phone]  Fax: [phone]  Follow Up Time: 7-10 Days   No

## 2025-06-03 ENCOUNTER — APPOINTMENT (OUTPATIENT)
Dept: FAMILY MEDICINE | Facility: CLINIC | Age: 75
End: 2025-06-03
Payer: MEDICARE

## 2025-06-03 ENCOUNTER — OUTPATIENT (OUTPATIENT)
Dept: OUTPATIENT SERVICES | Facility: HOSPITAL | Age: 75
LOS: 1 days | End: 2025-06-03
Payer: MEDICARE

## 2025-06-03 ENCOUNTER — APPOINTMENT (OUTPATIENT)
Dept: RADIOLOGY | Facility: HOSPITAL | Age: 75
End: 2025-06-03
Payer: MEDICARE

## 2025-06-03 VITALS
WEIGHT: 161 LBS | OXYGEN SATURATION: 97 % | TEMPERATURE: 97.8 F | SYSTOLIC BLOOD PRESSURE: 132 MMHG | HEIGHT: 62 IN | RESPIRATION RATE: 16 BRPM | HEART RATE: 78 BPM | DIASTOLIC BLOOD PRESSURE: 77 MMHG | BODY MASS INDEX: 29.63 KG/M2

## 2025-06-03 DIAGNOSIS — M25.562 PAIN IN LEFT KNEE: ICD-10-CM

## 2025-06-03 DIAGNOSIS — Z98.891 HISTORY OF UTERINE SCAR FROM PREVIOUS SURGERY: Chronic | ICD-10-CM

## 2025-06-03 DIAGNOSIS — R09.81 NASAL CONGESTION: ICD-10-CM

## 2025-06-03 DIAGNOSIS — R52 PAIN, UNSPECIFIED: ICD-10-CM

## 2025-06-03 DIAGNOSIS — M25.561 PAIN IN RIGHT KNEE: ICD-10-CM

## 2025-06-03 DIAGNOSIS — G89.29 PAIN, UNSPECIFIED: ICD-10-CM

## 2025-06-03 DIAGNOSIS — G89.29 LOW BACK PAIN, UNSPECIFIED: ICD-10-CM

## 2025-06-03 DIAGNOSIS — G89.29 PAIN IN RIGHT KNEE: ICD-10-CM

## 2025-06-03 DIAGNOSIS — M54.50 LOW BACK PAIN, UNSPECIFIED: ICD-10-CM

## 2025-06-03 DIAGNOSIS — M25.511 PAIN IN RIGHT SHOULDER: ICD-10-CM

## 2025-06-03 DIAGNOSIS — G89.4 CHRONIC PAIN SYNDROME: ICD-10-CM

## 2025-06-03 DIAGNOSIS — I10 ESSENTIAL (PRIMARY) HYPERTENSION: ICD-10-CM

## 2025-06-03 DIAGNOSIS — M25.512 PAIN IN RIGHT SHOULDER: ICD-10-CM

## 2025-06-03 DIAGNOSIS — G89.29 PAIN IN RIGHT SHOULDER: ICD-10-CM

## 2025-06-03 DIAGNOSIS — G89.29 PAIN IN LEFT KNEE: ICD-10-CM

## 2025-06-03 PROCEDURE — 73564 X-RAY EXAM KNEE 4 OR MORE: CPT

## 2025-06-03 PROCEDURE — 73564 X-RAY EXAM KNEE 4 OR MORE: CPT | Mod: 26,LT,RT

## 2025-06-03 PROCEDURE — G2211 COMPLEX E/M VISIT ADD ON: CPT

## 2025-06-03 PROCEDURE — 99214 OFFICE O/P EST MOD 30 MIN: CPT

## 2025-06-03 RX ORDER — FEXOFENADINE HYDROCHLORIDE 60 MG/1
60 TABLET, FILM COATED ORAL
Qty: 1 | Refills: 1 | Status: ACTIVE | COMMUNITY
Start: 2025-06-03 | End: 1900-01-01

## 2025-06-03 RX ORDER — FLUTICASONE PROPIONATE 50 UG/1
50 SPRAY, METERED NASAL
Qty: 16 | Refills: 1 | Status: ACTIVE | COMMUNITY
Start: 2025-06-03 | End: 1900-01-01

## 2025-06-09 ENCOUNTER — APPOINTMENT (OUTPATIENT)
Dept: ORTHOPEDIC SURGERY | Facility: CLINIC | Age: 75
End: 2025-06-09
Payer: MEDICARE

## 2025-06-09 VITALS — HEIGHT: 62 IN | BODY MASS INDEX: 29.44 KG/M2 | WEIGHT: 160 LBS

## 2025-06-09 PROBLEM — M17.0 LOCALIZED OSTEOARTHRITIS OF KNEES, BILATERAL: Status: ACTIVE | Noted: 2025-06-09

## 2025-06-09 PROCEDURE — 99204 OFFICE O/P NEW MOD 45 MIN: CPT

## 2025-06-26 ENCOUNTER — RX RENEWAL (OUTPATIENT)
Age: 75
End: 2025-06-26

## 2025-06-27 ENCOUNTER — APPOINTMENT (OUTPATIENT)
Dept: NEUROLOGY | Facility: CLINIC | Age: 75
End: 2025-06-27

## 2025-06-30 ENCOUNTER — APPOINTMENT (OUTPATIENT)
Dept: RHEUMATOLOGY | Facility: CLINIC | Age: 75
End: 2025-06-30
Payer: MEDICARE

## 2025-06-30 VITALS
TEMPERATURE: 97.5 F | WEIGHT: 160 LBS | HEIGHT: 62 IN | OXYGEN SATURATION: 99 % | SYSTOLIC BLOOD PRESSURE: 133 MMHG | DIASTOLIC BLOOD PRESSURE: 77 MMHG | BODY MASS INDEX: 29.44 KG/M2 | RESPIRATION RATE: 16 BRPM | HEART RATE: 76 BPM

## 2025-06-30 PROBLEM — M15.0 PRIMARY OSTEOARTHRITIS INVOLVING MULTIPLE JOINTS: Status: ACTIVE | Noted: 2025-06-30

## 2025-06-30 PROCEDURE — 99214 OFFICE O/P EST MOD 30 MIN: CPT

## 2025-07-01 ENCOUNTER — APPOINTMENT (OUTPATIENT)
Dept: NEUROLOGY | Facility: CLINIC | Age: 75
End: 2025-07-01
Payer: MEDICARE

## 2025-07-01 ENCOUNTER — NON-APPOINTMENT (OUTPATIENT)
Age: 75
End: 2025-07-01

## 2025-07-01 VITALS
WEIGHT: 160 LBS | OXYGEN SATURATION: 97 % | BODY MASS INDEX: 29.44 KG/M2 | HEIGHT: 62 IN | SYSTOLIC BLOOD PRESSURE: 110 MMHG | TEMPERATURE: 98.1 F | HEART RATE: 80 BPM | DIASTOLIC BLOOD PRESSURE: 66 MMHG

## 2025-07-01 PROCEDURE — G2211 COMPLEX E/M VISIT ADD ON: CPT

## 2025-07-01 PROCEDURE — 99214 OFFICE O/P EST MOD 30 MIN: CPT

## 2025-07-02 ENCOUNTER — LABORATORY RESULT (OUTPATIENT)
Age: 75
End: 2025-07-02

## 2025-07-08 ENCOUNTER — APPOINTMENT (OUTPATIENT)
Dept: CARDIOLOGY | Facility: CLINIC | Age: 75
End: 2025-07-08
Payer: MEDICARE

## 2025-07-08 VITALS — HEIGHT: 62 IN | OXYGEN SATURATION: 98 % | HEART RATE: 83 BPM | WEIGHT: 159 LBS | BODY MASS INDEX: 29.26 KG/M2

## 2025-07-08 VITALS — SYSTOLIC BLOOD PRESSURE: 120 MMHG | DIASTOLIC BLOOD PRESSURE: 80 MMHG

## 2025-07-08 PROCEDURE — 93000 ELECTROCARDIOGRAM COMPLETE: CPT

## 2025-07-08 PROCEDURE — 99215 OFFICE O/P EST HI 40 MIN: CPT | Mod: 25

## 2025-07-16 ENCOUNTER — APPOINTMENT (OUTPATIENT)
Dept: INFECTIOUS DISEASE | Facility: CLINIC | Age: 75
End: 2025-07-16
Payer: MEDICARE

## 2025-07-16 VITALS
BODY MASS INDEX: 29.44 KG/M2 | OXYGEN SATURATION: 96 % | SYSTOLIC BLOOD PRESSURE: 149 MMHG | HEIGHT: 62 IN | WEIGHT: 160 LBS | TEMPERATURE: 99.5 F | DIASTOLIC BLOOD PRESSURE: 81 MMHG | HEART RATE: 84 BPM

## 2025-07-16 PROCEDURE — 99212 OFFICE O/P EST SF 10 MIN: CPT

## 2025-07-17 ENCOUNTER — NON-APPOINTMENT (OUTPATIENT)
Age: 75
End: 2025-07-17

## 2025-07-17 LAB
APTT BLD: 29.7 SEC
INR PPP: 0.98 RATIO
PT BLD: 11.6 SEC
RPR SER-TITR: NORMAL

## 2025-07-24 ENCOUNTER — NON-APPOINTMENT (OUTPATIENT)
Age: 75
End: 2025-07-24

## 2025-07-28 ENCOUNTER — RX RENEWAL (OUTPATIENT)
Age: 75
End: 2025-07-28

## 2025-08-06 ENCOUNTER — OUTPATIENT (OUTPATIENT)
Dept: OUTPATIENT SERVICES | Facility: HOSPITAL | Age: 75
LOS: 1 days | End: 2025-08-06
Payer: COMMERCIAL

## 2025-08-06 ENCOUNTER — RESULT REVIEW (OUTPATIENT)
Age: 75
End: 2025-08-06

## 2025-08-06 ENCOUNTER — APPOINTMENT (OUTPATIENT)
Dept: RADIOLOGY | Facility: HOSPITAL | Age: 75
End: 2025-08-06
Payer: MEDICARE

## 2025-08-06 ENCOUNTER — EMERGENCY (EMERGENCY)
Facility: HOSPITAL | Age: 75
LOS: 1 days | End: 2025-08-06
Attending: EMERGENCY MEDICINE
Payer: COMMERCIAL

## 2025-08-06 VITALS
TEMPERATURE: 98 F | RESPIRATION RATE: 17 BRPM | HEART RATE: 78 BPM | OXYGEN SATURATION: 99 % | SYSTOLIC BLOOD PRESSURE: 135 MMHG | DIASTOLIC BLOOD PRESSURE: 71 MMHG

## 2025-08-06 DIAGNOSIS — Z95.5 PRESENCE OF CORONARY ANGIOPLASTY IMPLANT AND GRAFT: Chronic | ICD-10-CM

## 2025-08-06 DIAGNOSIS — Z98.891 HISTORY OF UTERINE SCAR FROM PREVIOUS SURGERY: Chronic | ICD-10-CM

## 2025-08-06 DIAGNOSIS — A53.9 SYPHILIS, UNSPECIFIED: ICD-10-CM

## 2025-08-06 LAB
APPEARANCE CSF: CLEAR — SIGNIFICANT CHANGE UP
COLOR CSF: ABNORMAL
GLUCOSE CSF-MCNC: 68 MG/DL — SIGNIFICANT CHANGE UP (ref 40–70)
LYMPHOCYTES # CSF: 35 % — LOW (ref 40–80)
MONOS+MACROS NFR CSF: 17 % — SIGNIFICANT CHANGE UP (ref 15–45)
NEUTROPHILS # CSF: 48 % — HIGH (ref 0–6)
NRBC NFR CSF: <1 /UL — SIGNIFICANT CHANGE UP (ref 0–5)
PROT CSF-MCNC: 29 MG/DL — SIGNIFICANT CHANGE UP (ref 15–45)
RBC # CSF: 1140 /UL — HIGH (ref 0–0)
TUBE TYPE: SIGNIFICANT CHANGE UP

## 2025-08-06 PROCEDURE — 89051 BODY FLUID CELL COUNT: CPT

## 2025-08-06 PROCEDURE — L9981: CPT

## 2025-08-06 PROCEDURE — 82945 GLUCOSE OTHER FLUID: CPT

## 2025-08-06 PROCEDURE — 99282 EMERGENCY DEPT VISIT SF MDM: CPT

## 2025-08-06 PROCEDURE — 62328 DX LMBR SPI PNXR W/FLUOR/CT: CPT

## 2025-08-06 PROCEDURE — 86592 SYPHILIS TEST NON-TREP QUAL: CPT

## 2025-08-06 PROCEDURE — 84157 ASSAY OF PROTEIN OTHER: CPT

## 2025-08-08 ENCOUNTER — NON-APPOINTMENT (OUTPATIENT)
Age: 75
End: 2025-08-08

## 2025-08-11 ENCOUNTER — RX RENEWAL (OUTPATIENT)
Age: 75
End: 2025-08-11

## 2025-08-11 ENCOUNTER — APPOINTMENT (OUTPATIENT)
Dept: CARE COORDINATION | Facility: HOME HEALTH | Age: 75
End: 2025-08-11

## 2025-08-11 DIAGNOSIS — N32.81 OVERACTIVE BLADDER: ICD-10-CM

## 2025-08-11 PROCEDURE — 99347 HOME/RES VST EST SF MDM 20: CPT | Mod: 93

## 2025-08-11 RX ORDER — ELECTROLYTES/DEXTROSE
SOLUTION, ORAL ORAL DAILY
Qty: 1 | Refills: 0 | Status: ACTIVE | COMMUNITY
Start: 2025-08-11

## 2025-08-11 RX ORDER — MULTIVIT-MIN/IRON/FOLIC ACID/K 18-600-40
50 MCG CAPSULE ORAL
Qty: 1 | Refills: 0 | Status: ACTIVE | COMMUNITY
Start: 2025-08-11

## 2025-08-11 RX ORDER — SOLIFENACIN SUCCINATE 5 MG/1
5 TABLET ORAL DAILY
Refills: 0 | Status: ACTIVE | COMMUNITY
Start: 2025-08-11

## 2025-08-11 RX ORDER — HYDROXYZINE HYDROCHLORIDE 50 MG/1
50 TABLET ORAL
Refills: 0 | Status: DISCONTINUED | COMMUNITY
Start: 2025-08-08 | End: 2025-08-11

## 2025-08-11 RX ORDER — CLONAZEPAM 1 MG/1
1 TABLET ORAL
Refills: 0 | Status: DISCONTINUED | COMMUNITY
Start: 2025-08-08 | End: 2025-08-11

## 2025-08-11 RX ORDER — GABAPENTIN 100 MG/1
100 CAPSULE ORAL
Refills: 0 | Status: DISCONTINUED | COMMUNITY
Start: 2025-08-08 | End: 2025-08-11

## 2025-08-11 RX ORDER — HYDROXYCHLOROQUINE SULFATE 200 MG/1
200 TABLET ORAL
Refills: 0 | Status: DISCONTINUED | COMMUNITY
Start: 2025-08-08 | End: 2025-08-11

## 2025-08-12 ENCOUNTER — RX RENEWAL (OUTPATIENT)
Age: 75
End: 2025-08-12

## 2025-08-12 LAB — VDRL CSF-TITR: SIGNIFICANT CHANGE UP

## 2025-08-13 ENCOUNTER — APPOINTMENT (OUTPATIENT)
Dept: HOME HEALTH SERVICES | Facility: HOME HEALTH | Age: 75
End: 2025-08-13
Payer: MEDICARE

## 2025-08-13 VITALS
SYSTOLIC BLOOD PRESSURE: 120 MMHG | TEMPERATURE: 99 F | HEART RATE: 67 BPM | OXYGEN SATURATION: 97 % | DIASTOLIC BLOOD PRESSURE: 60 MMHG

## 2025-08-13 DIAGNOSIS — Z86.19 PERSONAL HISTORY OF OTHER INFECTIOUS AND PARASITIC DISEASES: ICD-10-CM

## 2025-08-13 DIAGNOSIS — F33.9 MAJOR DEPRESSIVE DISORDER, RECURRENT, UNSPECIFIED: ICD-10-CM

## 2025-08-13 DIAGNOSIS — I69.322 DYSARTHRIA FOLLOWING CEREBRAL INFARCTION: ICD-10-CM

## 2025-08-13 PROCEDURE — 99348 HOME/RES VST EST LOW MDM 30: CPT

## 2025-08-15 PROBLEM — Z86.19 HISTORY OF HERPES LABIALIS: Status: RESOLVED | Noted: 2017-03-23 | Resolved: 2025-08-15

## 2025-08-18 ENCOUNTER — APPOINTMENT (OUTPATIENT)
Dept: INFECTIOUS DISEASE | Facility: CLINIC | Age: 75
End: 2025-08-18
Payer: MEDICARE

## 2025-08-18 DIAGNOSIS — A53.0 LATENT SYPHILIS, UNSPECIFIED AS EARLY OR LATE: ICD-10-CM

## 2025-08-18 PROCEDURE — 99212 OFFICE O/P EST SF 10 MIN: CPT

## 2025-08-18 RX ADMIN — PENICILLIN G BENZATHINE 0 UNIT/4ML: 2400000 INJECTION, SUSPENSION INTRAMUSCULAR at 00:00

## 2025-08-30 PROBLEM — Z86.79 HISTORY OF CRESCENDO ANGINA: Status: RESOLVED | Noted: 2024-05-07 | Resolved: 2025-08-30

## 2025-09-18 ENCOUNTER — APPOINTMENT (OUTPATIENT)
Dept: FAMILY MEDICINE | Facility: CLINIC | Age: 75
End: 2025-09-18

## 2025-09-18 VITALS
BODY MASS INDEX: 28.34 KG/M2 | HEART RATE: 80 BPM | SYSTOLIC BLOOD PRESSURE: 120 MMHG | HEIGHT: 62 IN | DIASTOLIC BLOOD PRESSURE: 72 MMHG | OXYGEN SATURATION: 97 % | WEIGHT: 154 LBS | RESPIRATION RATE: 16 BRPM

## 2025-09-18 DIAGNOSIS — G89.29 PAIN, UNSPECIFIED: ICD-10-CM

## 2025-09-18 DIAGNOSIS — I10 ESSENTIAL (PRIMARY) HYPERTENSION: ICD-10-CM

## 2025-09-18 DIAGNOSIS — M26.649 ARTHRITIS OF UNSPECIFIED TEMPOROMANDIBULAR JOINT: ICD-10-CM

## 2025-09-18 DIAGNOSIS — M25.50 PAIN IN UNSPECIFIED JOINT: ICD-10-CM

## 2025-09-18 DIAGNOSIS — R52 PAIN, UNSPECIFIED: ICD-10-CM

## 2025-09-18 DIAGNOSIS — E78.5 HYPERLIPIDEMIA, UNSPECIFIED: ICD-10-CM

## 2025-09-18 RX ORDER — CYCLOBENZAPRINE HYDROCHLORIDE 5 MG/1
5 TABLET, FILM COATED ORAL
Qty: 30 | Refills: 1 | Status: ACTIVE | COMMUNITY
Start: 2025-09-18 | End: 1900-01-01

## 2025-09-18 RX ORDER — METHYLPREDNISOLONE 4 MG/1
4 TABLET ORAL
Qty: 1 | Refills: 0 | Status: ACTIVE | COMMUNITY
Start: 2025-09-18 | End: 1900-01-01

## (undated) DEVICE — TUBING CAP SET ERBEFLO CLEVERCAP HYBRID CO2 FOR OLYMPUS SCOPES AND UCR

## (undated) DEVICE — KIT ENDO PROCEDURE CUST W/VLV

## (undated) DEVICE — ENDOCUFF VISION SZ 2 LG GRN

## (undated) DEVICE — FORCEP RADIAL JAW 4 240CM DISP

## (undated) DEVICE — SNARE EXACTO COLD 9MMX230CM

## (undated) DEVICE — PLATE NESSY 170

## (undated) DEVICE — CONTAINER FORMALIN BUFF 10% 60ML

## (undated) DEVICE — POLY TRAP ETRAP

## (undated) DEVICE — SNARE POLYP SENS SM 13MM 240CM

## (undated) DEVICE — FORCEP RADIAL JAW 4 W NDL 2.4MM 2.8MM 240CM ORANGE DISP

## (undated) DEVICE — SOL IRR POUR H2O 1000ML